# Patient Record
Sex: FEMALE | Race: WHITE | Employment: FULL TIME | URBAN - NONMETROPOLITAN AREA
[De-identification: names, ages, dates, MRNs, and addresses within clinical notes are randomized per-mention and may not be internally consistent; named-entity substitution may affect disease eponyms.]

---

## 2017-05-14 ENCOUNTER — HOSPITAL ENCOUNTER (EMERGENCY)
Age: 47
Discharge: HOME | End: 2017-05-14
Attending: EMERGENCY MEDICINE
Payer: COMMERCIAL

## 2017-05-14 VITALS
RESPIRATION RATE: 16 BRPM | TEMPERATURE: 98.8 F | BODY MASS INDEX: 29.88 KG/M2 | DIASTOLIC BLOOD PRESSURE: 83 MMHG | OXYGEN SATURATION: 100 % | SYSTOLIC BLOOD PRESSURE: 146 MMHG | HEIGHT: 64 IN | WEIGHT: 175 LBS | HEART RATE: 83 BPM

## 2017-05-14 DIAGNOSIS — L02.91 ABSCESS: Primary | ICD-10-CM

## 2017-05-14 PROCEDURE — 10060 I&D ABSCESS SIMPLE/SINGLE: CPT

## 2017-05-14 PROCEDURE — 2500000003 HC RX 250 WO HCPCS: Performed by: EMERGENCY MEDICINE

## 2017-05-14 PROCEDURE — 99282 EMERGENCY DEPT VISIT SF MDM: CPT

## 2017-05-14 RX ORDER — IBUPROFEN 800 MG/1
800 TABLET ORAL EVERY 8 HOURS PRN
Qty: 30 TABLET | Refills: 0 | Status: SHIPPED | OUTPATIENT
Start: 2017-05-14 | End: 2018-02-13

## 2017-05-14 RX ORDER — CEPHALEXIN 500 MG/1
500 CAPSULE ORAL 4 TIMES DAILY
Qty: 40 CAPSULE | Refills: 0 | Status: SHIPPED | OUTPATIENT
Start: 2017-05-14 | End: 2017-05-24

## 2017-05-14 RX ORDER — SULFAMETHOXAZOLE AND TRIMETHOPRIM 800; 160 MG/1; MG/1
1 TABLET ORAL 2 TIMES DAILY
Qty: 20 TABLET | Refills: 0 | Status: SHIPPED | OUTPATIENT
Start: 2017-05-14 | End: 2017-05-24

## 2017-05-14 RX ORDER — LIDOCAINE HYDROCHLORIDE 10 MG/ML
5 INJECTION, SOLUTION INFILTRATION; PERINEURAL ONCE
Status: COMPLETED | OUTPATIENT
Start: 2017-05-14 | End: 2017-05-14

## 2017-05-14 RX ADMIN — LIDOCAINE HYDROCHLORIDE 5 ML: 10 INJECTION, SOLUTION INFILTRATION; PERINEURAL at 13:01

## 2017-05-14 ASSESSMENT — PAIN SCALES - GENERAL
PAINLEVEL_OUTOF10: 8
PAINLEVEL_OUTOF10: 6

## 2017-05-14 ASSESSMENT — PAIN DESCRIPTION - DESCRIPTORS: DESCRIPTORS: THROBBING

## 2017-05-14 ASSESSMENT — PAIN DESCRIPTION - PAIN TYPE: TYPE: ACUTE PAIN

## 2017-05-14 ASSESSMENT — PAIN DESCRIPTION - ONSET: ONSET: GRADUAL

## 2017-05-14 ASSESSMENT — PAIN DESCRIPTION - LOCATION: LOCATION: GROIN

## 2017-05-14 ASSESSMENT — PAIN DESCRIPTION - ORIENTATION: ORIENTATION: LEFT

## 2017-05-14 ASSESSMENT — PAIN DESCRIPTION - FREQUENCY: FREQUENCY: INTERMITTENT

## 2017-06-27 ENCOUNTER — OFFICE VISIT (OUTPATIENT)
Dept: PODIATRY | Age: 47
End: 2017-06-27
Payer: COMMERCIAL

## 2017-06-27 VITALS — HEIGHT: 65 IN | SYSTOLIC BLOOD PRESSURE: 118 MMHG | DIASTOLIC BLOOD PRESSURE: 70 MMHG | HEART RATE: 70 BPM

## 2017-06-27 DIAGNOSIS — M72.2 PLANTAR FASCIITIS OF RIGHT FOOT: Primary | ICD-10-CM

## 2017-06-27 PROCEDURE — 20550 NJX 1 TENDON SHEATH/LIGAMENT: CPT | Performed by: PODIATRIST

## 2017-06-27 PROCEDURE — L3040 FT ARCH SUPRT PREMOLD LONGIT: HCPCS | Performed by: PODIATRIST

## 2017-06-27 RX ORDER — BETAMETHASONE SODIUM PHOSPHATE AND BETAMETHASONE ACETATE 3; 3 MG/ML; MG/ML
6 INJECTION, SUSPENSION INTRA-ARTICULAR; INTRALESIONAL; INTRAMUSCULAR; SOFT TISSUE ONCE
Status: COMPLETED | OUTPATIENT
Start: 2017-06-27 | End: 2017-06-27

## 2017-06-27 RX ADMIN — BETAMETHASONE SODIUM PHOSPHATE AND BETAMETHASONE ACETATE 6 MG: 3; 3 INJECTION, SUSPENSION INTRA-ARTICULAR; INTRALESIONAL; INTRAMUSCULAR; SOFT TISSUE at 14:43

## 2017-07-14 ENCOUNTER — TELEPHONE (OUTPATIENT)
Dept: PODIATRY | Age: 47
End: 2017-07-14

## 2018-02-13 ENCOUNTER — HOSPITAL ENCOUNTER (OUTPATIENT)
Age: 48
Setting detail: SPECIMEN
Discharge: HOME | End: 2018-02-13
Payer: COMMERCIAL

## 2018-02-13 ENCOUNTER — OFFICE VISIT (OUTPATIENT)
Dept: OBGYN | Age: 48
End: 2018-02-13
Payer: COMMERCIAL

## 2018-02-13 VITALS
DIASTOLIC BLOOD PRESSURE: 70 MMHG | SYSTOLIC BLOOD PRESSURE: 118 MMHG | HEART RATE: 68 BPM | HEIGHT: 64 IN | BODY MASS INDEX: 30.05 KG/M2 | WEIGHT: 176 LBS

## 2018-02-13 DIAGNOSIS — Z01.419 WOMEN'S ANNUAL ROUTINE GYNECOLOGICAL EXAMINATION: ICD-10-CM

## 2018-02-13 DIAGNOSIS — Z00.00 ANNUAL PHYSICAL EXAM: Primary | ICD-10-CM

## 2018-02-13 DIAGNOSIS — N95.1 PERIMENOPAUSAL: ICD-10-CM

## 2018-02-13 DIAGNOSIS — Z00.00 ANNUAL PHYSICAL EXAM: ICD-10-CM

## 2018-02-13 PROCEDURE — 99396 PREV VISIT EST AGE 40-64: CPT | Performed by: ADVANCED PRACTICE MIDWIFE

## 2018-02-13 PROCEDURE — G0145 SCR C/V CYTO,THINLAYER,RESCR: HCPCS

## 2018-02-13 ASSESSMENT — ENCOUNTER SYMPTOMS
EYES NEGATIVE: 1
ALLERGIC/IMMUNOLOGIC NEGATIVE: 1
GASTROINTESTINAL NEGATIVE: 1
RESPIRATORY NEGATIVE: 1

## 2018-02-13 NOTE — LETTER
[facility]  [address] [place] [address]          February 22, 2018    [address] [place] [address]      Dear Roz Geronimo: The results of your most recent Pap smear are normal. This means that no cancerous or precancerous cells were seen. We recommend that you come back in 1 year for your next routine Pap smear. If you have any questions or concerns, please don't hesitate to call.     Sincerely,        Anil Grant CNM

## 2018-02-20 ENCOUNTER — HOSPITAL ENCOUNTER (OUTPATIENT)
Dept: MAMMOGRAPHY | Age: 48
Discharge: HOME | End: 2018-02-22
Payer: COMMERCIAL

## 2018-02-20 DIAGNOSIS — N95.1 PERIMENOPAUSAL: ICD-10-CM

## 2018-02-20 DIAGNOSIS — Z01.419 WOMEN'S ANNUAL ROUTINE GYNECOLOGICAL EXAMINATION: ICD-10-CM

## 2018-02-20 DIAGNOSIS — Z00.00 ANNUAL PHYSICAL EXAM: ICD-10-CM

## 2018-02-20 PROCEDURE — 77063 BREAST TOMOSYNTHESIS BI: CPT

## 2018-02-22 LAB — CYTOLOGY REPORT: NORMAL

## 2018-02-28 ENCOUNTER — HOSPITAL ENCOUNTER (OUTPATIENT)
Dept: LAB | Age: 48
Setting detail: SPECIMEN
Discharge: HOME | End: 2018-02-28
Payer: COMMERCIAL

## 2018-02-28 DIAGNOSIS — N95.1 PERIMENOPAUSAL: ICD-10-CM

## 2018-02-28 DIAGNOSIS — Z01.419 WOMEN'S ANNUAL ROUTINE GYNECOLOGICAL EXAMINATION: ICD-10-CM

## 2018-02-28 LAB
ABSOLUTE EOS #: 0.1 K/UL (ref 0–0.4)
ABSOLUTE IMMATURE GRANULOCYTE: NORMAL K/UL (ref 0–0.3)
ABSOLUTE LYMPH #: 1.6 K/UL (ref 1–4.8)
ABSOLUTE MONO #: 0.4 K/UL (ref 0.1–1.2)
ALBUMIN SERPL-MCNC: 4.2 G/DL (ref 3.5–5.2)
ALBUMIN/GLOBULIN RATIO: 1.4 (ref 1–2.5)
ALP BLD-CCNC: 77 U/L (ref 35–104)
ALT SERPL-CCNC: 9 U/L (ref 5–33)
ANION GAP SERPL CALCULATED.3IONS-SCNC: 12 MMOL/L (ref 9–17)
AST SERPL-CCNC: 14 U/L
BASOPHILS # BLD: 1 % (ref 0–1)
BASOPHILS ABSOLUTE: 0 K/UL (ref 0–0.2)
BILIRUB SERPL-MCNC: 0.8 MG/DL (ref 0.3–1.2)
BUN BLDV-MCNC: 10 MG/DL (ref 6–20)
BUN/CREAT BLD: 22 (ref 9–20)
CALCIUM SERPL-MCNC: 9.3 MG/DL (ref 8.6–10.4)
CHLORIDE BLD-SCNC: 101 MMOL/L (ref 98–107)
CHOLESTEROL, FASTING: 198 MG/DL
CHOLESTEROL/HDL RATIO: 2.8
CO2: 27 MMOL/L (ref 20–31)
CREAT SERPL-MCNC: 0.45 MG/DL (ref 0.5–0.9)
DIFFERENTIAL TYPE: NORMAL
EOSINOPHILS RELATIVE PERCENT: 1 % (ref 1–7)
GFR AFRICAN AMERICAN: >60 ML/MIN
GFR NON-AFRICAN AMERICAN: >60 ML/MIN
GFR SERPL CREATININE-BSD FRML MDRD: ABNORMAL ML/MIN/{1.73_M2}
GFR SERPL CREATININE-BSD FRML MDRD: ABNORMAL ML/MIN/{1.73_M2}
GLUCOSE BLD-MCNC: 98 MG/DL (ref 70–99)
HCT VFR BLD CALC: 37.6 % (ref 36–46)
HDLC SERPL-MCNC: 72 MG/DL
HEMOGLOBIN: 12.6 G/DL (ref 12–16)
IMMATURE GRANULOCYTES: NORMAL %
LDL CHOLESTEROL: 108 MG/DL (ref 0–130)
LYMPHOCYTES # BLD: 33 % (ref 16–46)
MCH RBC QN AUTO: 26.9 PG (ref 26–34)
MCHC RBC AUTO-ENTMCNC: 33.4 G/DL (ref 31–37)
MCV RBC AUTO: 80.3 FL (ref 80–100)
MONOCYTES # BLD: 8 % (ref 4–11)
NRBC AUTOMATED: NORMAL PER 100 WBC
PDW BLD-RTO: 14.2 % (ref 11–14.5)
PLATELET # BLD: 251 K/UL (ref 140–450)
PLATELET ESTIMATE: NORMAL
PMV BLD AUTO: 8.8 FL (ref 6–12)
POTASSIUM SERPL-SCNC: 4.4 MMOL/L (ref 3.7–5.3)
RBC # BLD: 4.68 M/UL (ref 4–5.2)
RBC # BLD: NORMAL 10*6/UL
SEG NEUTROPHILS: 57 % (ref 43–77)
SEGMENTED NEUTROPHILS ABSOLUTE COUNT: 2.7 K/UL (ref 1.8–7.7)
SODIUM BLD-SCNC: 140 MMOL/L (ref 135–144)
THYROXINE, FREE: 1.12 NG/DL (ref 0.93–1.7)
TOTAL PROTEIN: 7.3 G/DL (ref 6.4–8.3)
TRIGLYCERIDE, FASTING: 91 MG/DL
TSH SERPL DL<=0.05 MIU/L-ACNC: 2.05 MIU/L (ref 0.3–5)
VLDLC SERPL CALC-MCNC: NORMAL MG/DL (ref 1–30)
WBC # BLD: 4.8 K/UL (ref 3.5–11)
WBC # BLD: NORMAL 10*3/UL

## 2018-02-28 PROCEDURE — 84439 ASSAY OF FREE THYROXINE: CPT

## 2018-02-28 PROCEDURE — 36415 COLL VENOUS BLD VENIPUNCTURE: CPT

## 2018-02-28 PROCEDURE — 84443 ASSAY THYROID STIM HORMONE: CPT

## 2018-02-28 PROCEDURE — 80061 LIPID PANEL: CPT

## 2018-02-28 PROCEDURE — 85025 COMPLETE CBC W/AUTO DIFF WBC: CPT

## 2018-02-28 PROCEDURE — 80053 COMPREHEN METABOLIC PANEL: CPT

## 2019-01-31 ENCOUNTER — TELEPHONE (OUTPATIENT)
Dept: OBGYN | Age: 49
End: 2019-01-31

## 2019-01-31 DIAGNOSIS — F50.89 PICA: ICD-10-CM

## 2019-01-31 DIAGNOSIS — R53.83 OTHER FATIGUE: Primary | ICD-10-CM

## 2019-02-06 ENCOUNTER — HOSPITAL ENCOUNTER (OUTPATIENT)
Dept: LAB | Age: 49
Discharge: HOME | End: 2019-02-06
Payer: COMMERCIAL

## 2019-02-06 DIAGNOSIS — R53.83 OTHER FATIGUE: ICD-10-CM

## 2019-02-06 DIAGNOSIS — F50.89 PICA: ICD-10-CM

## 2019-02-06 LAB
ABSOLUTE EOS #: 0.1 K/UL (ref 0–0.4)
ABSOLUTE IMMATURE GRANULOCYTE: ABNORMAL K/UL (ref 0–0.3)
ABSOLUTE LYMPH #: 1.4 K/UL (ref 1–4.8)
ABSOLUTE MONO #: 0.4 K/UL (ref 0.1–1.2)
BASOPHILS # BLD: 1 % (ref 0–1)
BASOPHILS ABSOLUTE: 0 K/UL (ref 0–0.2)
DIFFERENTIAL TYPE: ABNORMAL
EOSINOPHILS RELATIVE PERCENT: 2 % (ref 1–7)
HCT VFR BLD CALC: 37 % (ref 36–46)
HEMOGLOBIN: 11.9 G/DL (ref 12–16)
IMMATURE GRANULOCYTES: ABNORMAL %
IRON SATURATION: 8 % (ref 20–55)
IRON: 34 UG/DL (ref 37–145)
LYMPHOCYTES # BLD: 30 % (ref 16–46)
MCH RBC QN AUTO: 25.5 PG (ref 26–34)
MCHC RBC AUTO-ENTMCNC: 32.1 G/DL (ref 31–37)
MCV RBC AUTO: 79.5 FL (ref 80–100)
MONOCYTES # BLD: 9 % (ref 4–11)
NRBC AUTOMATED: ABNORMAL PER 100 WBC
PDW BLD-RTO: 14.7 % (ref 11–14.5)
PLATELET # BLD: 276 K/UL (ref 140–450)
PLATELET ESTIMATE: ABNORMAL
PMV BLD AUTO: 8.6 FL (ref 6–12)
RBC # BLD: 4.65 M/UL (ref 4–5.2)
RBC # BLD: ABNORMAL 10*6/UL
SEG NEUTROPHILS: 58 % (ref 43–77)
SEGMENTED NEUTROPHILS ABSOLUTE COUNT: 2.7 K/UL (ref 1.8–7.7)
TOTAL IRON BINDING CAPACITY: 414 UG/DL (ref 250–450)
UNSATURATED IRON BINDING CAPACITY: 380 UG/DL (ref 112–347)
WBC # BLD: 4.7 K/UL (ref 3.5–11)
WBC # BLD: ABNORMAL 10*3/UL

## 2019-02-06 PROCEDURE — 36415 COLL VENOUS BLD VENIPUNCTURE: CPT

## 2019-02-06 PROCEDURE — 83540 ASSAY OF IRON: CPT

## 2019-02-06 PROCEDURE — 83550 IRON BINDING TEST: CPT

## 2019-02-06 PROCEDURE — 85025 COMPLETE CBC W/AUTO DIFF WBC: CPT

## 2019-03-08 ENCOUNTER — TELEPHONE (OUTPATIENT)
Dept: MAMMOGRAPHY | Age: 49
End: 2019-03-08

## 2019-03-08 DIAGNOSIS — Z12.39 SCREENING FOR BREAST CANCER: Primary | ICD-10-CM

## 2019-03-12 ENCOUNTER — HOSPITAL ENCOUNTER (OUTPATIENT)
Dept: MAMMOGRAPHY | Age: 49
Discharge: HOME | End: 2019-03-14
Payer: COMMERCIAL

## 2019-03-12 ENCOUNTER — OFFICE VISIT (OUTPATIENT)
Dept: OBGYN | Age: 49
End: 2019-03-12
Payer: COMMERCIAL

## 2019-03-12 ENCOUNTER — HOSPITAL ENCOUNTER (OUTPATIENT)
Age: 49
Setting detail: SPECIMEN
Discharge: HOME | End: 2019-03-12
Payer: COMMERCIAL

## 2019-03-12 VITALS
WEIGHT: 185 LBS | HEIGHT: 64 IN | SYSTOLIC BLOOD PRESSURE: 110 MMHG | BODY MASS INDEX: 31.58 KG/M2 | DIASTOLIC BLOOD PRESSURE: 64 MMHG | HEART RATE: 78 BPM

## 2019-03-12 DIAGNOSIS — N85.2 BULKY OR ENLARGED UTERUS: ICD-10-CM

## 2019-03-12 DIAGNOSIS — L29.2 ITCHING OF VULVA: ICD-10-CM

## 2019-03-12 DIAGNOSIS — Z01.419 WOMEN'S ANNUAL ROUTINE GYNECOLOGICAL EXAMINATION: Primary | ICD-10-CM

## 2019-03-12 DIAGNOSIS — Z01.419 WOMEN'S ANNUAL ROUTINE GYNECOLOGICAL EXAMINATION: ICD-10-CM

## 2019-03-12 DIAGNOSIS — Z12.39 SCREENING FOR BREAST CANCER: ICD-10-CM

## 2019-03-12 PROCEDURE — 99396 PREV VISIT EST AGE 40-64: CPT | Performed by: ADVANCED PRACTICE MIDWIFE

## 2019-03-12 PROCEDURE — G0145 SCR C/V CYTO,THINLAYER,RESCR: HCPCS

## 2019-03-12 PROCEDURE — 77063 BREAST TOMOSYNTHESIS BI: CPT

## 2019-03-12 ASSESSMENT — ENCOUNTER SYMPTOMS
GASTROINTESTINAL NEGATIVE: 1
RESPIRATORY NEGATIVE: 1
EYES NEGATIVE: 1

## 2019-03-12 ASSESSMENT — PATIENT HEALTH QUESTIONNAIRE - PHQ9
2. FEELING DOWN, DEPRESSED OR HOPELESS: 1
SUM OF ALL RESPONSES TO PHQ9 QUESTIONS 1 & 2: 2
SUM OF ALL RESPONSES TO PHQ QUESTIONS 1-9: 2
SUM OF ALL RESPONSES TO PHQ QUESTIONS 1-9: 2
1. LITTLE INTEREST OR PLEASURE IN DOING THINGS: 1

## 2019-03-13 LAB — COMMENT: NORMAL

## 2019-03-19 ENCOUNTER — HOSPITAL ENCOUNTER (OUTPATIENT)
Dept: ULTRASOUND IMAGING | Age: 49
Discharge: HOME | End: 2019-03-21
Payer: COMMERCIAL

## 2019-03-19 DIAGNOSIS — N85.2 BULKY OR ENLARGED UTERUS: ICD-10-CM

## 2019-03-19 PROCEDURE — 76830 TRANSVAGINAL US NON-OB: CPT

## 2019-03-25 LAB — CYTOLOGY REPORT: NORMAL

## 2019-03-26 DIAGNOSIS — N83.202 CYST OF LEFT OVARY: Primary | ICD-10-CM

## 2019-04-01 ENCOUNTER — HOSPITAL ENCOUNTER (OUTPATIENT)
Dept: LAB | Age: 49
Discharge: HOME | End: 2019-04-01
Payer: COMMERCIAL

## 2019-04-01 DIAGNOSIS — N83.202 CYST OF LEFT OVARY: ICD-10-CM

## 2019-04-01 LAB
ABSOLUTE EOS #: 0.1 K/UL (ref 0–0.4)
ABSOLUTE IMMATURE GRANULOCYTE: ABNORMAL K/UL (ref 0–0.3)
ABSOLUTE LYMPH #: 1.5 K/UL (ref 1–4.8)
ABSOLUTE MONO #: 0.4 K/UL (ref 0.1–1.2)
AFP: 3.6 UG/L
BASOPHILS # BLD: 0 % (ref 0–1)
BASOPHILS ABSOLUTE: 0 K/UL (ref 0–0.2)
CA 125: 12 U/ML
CARCINOEMBRYONIC ANTIGEN: 2.3 NG/ML
DIFFERENTIAL TYPE: ABNORMAL
EOSINOPHILS RELATIVE PERCENT: 2 % (ref 1–7)
HCT VFR BLD CALC: 39.9 % (ref 36–46)
HEMOGLOBIN: 12.9 G/DL (ref 12–16)
IMMATURE GRANULOCYTES: ABNORMAL %
LYMPHOCYTES # BLD: 30 % (ref 16–46)
MCH RBC QN AUTO: 26.5 PG (ref 26–34)
MCHC RBC AUTO-ENTMCNC: 32.4 G/DL (ref 31–37)
MCV RBC AUTO: 81.6 FL (ref 80–100)
MONOCYTES # BLD: 8 % (ref 4–11)
NRBC AUTOMATED: ABNORMAL PER 100 WBC
PDW BLD-RTO: 16.8 % (ref 11–14.5)
PLATELET # BLD: 256 K/UL (ref 140–450)
PLATELET ESTIMATE: ABNORMAL
PMV BLD AUTO: 8.7 FL (ref 6–12)
RBC # BLD: 4.89 M/UL (ref 4–5.2)
RBC # BLD: ABNORMAL 10*6/UL
SEG NEUTROPHILS: 60 % (ref 43–77)
SEGMENTED NEUTROPHILS ABSOLUTE COUNT: 3 K/UL (ref 1.8–7.7)
WBC # BLD: 4.9 K/UL (ref 3.5–11)
WBC # BLD: ABNORMAL 10*3/UL

## 2019-04-01 PROCEDURE — 85025 COMPLETE CBC W/AUTO DIFF WBC: CPT

## 2019-04-01 PROCEDURE — 36415 COLL VENOUS BLD VENIPUNCTURE: CPT

## 2019-04-01 PROCEDURE — 82105 ALPHA-FETOPROTEIN SERUM: CPT

## 2019-04-01 PROCEDURE — 86304 IMMUNOASSAY TUMOR CA 125: CPT

## 2019-04-01 PROCEDURE — 82378 CARCINOEMBRYONIC ANTIGEN: CPT

## 2019-04-09 ENCOUNTER — OFFICE VISIT (OUTPATIENT)
Dept: OBGYN | Age: 49
End: 2019-04-09
Payer: COMMERCIAL

## 2019-04-09 VITALS
DIASTOLIC BLOOD PRESSURE: 78 MMHG | SYSTOLIC BLOOD PRESSURE: 124 MMHG | WEIGHT: 185 LBS | HEIGHT: 64 IN | RESPIRATION RATE: 20 BRPM | HEART RATE: 72 BPM | BODY MASS INDEX: 31.58 KG/M2

## 2019-04-09 DIAGNOSIS — N83.202 CYST OF LEFT OVARY: Primary | ICD-10-CM

## 2019-04-09 DIAGNOSIS — D25.1 INTRAMURAL LEIOMYOMA OF UTERUS: ICD-10-CM

## 2019-04-09 PROCEDURE — 99202 OFFICE O/P NEW SF 15 MIN: CPT | Performed by: OBSTETRICS & GYNECOLOGY

## 2019-04-09 RX ORDER — PHENOL 1.4 %
1 AEROSOL, SPRAY (ML) MUCOUS MEMBRANE DAILY
COMMUNITY
End: 2021-11-17

## 2019-04-09 RX ORDER — MEDROXYPROGESTERONE ACETATE 10 MG/1
10 TABLET ORAL 2 TIMES DAILY
Qty: 90 TABLET | Refills: 3 | Status: SHIPPED | OUTPATIENT
Start: 2019-04-09 | End: 2019-09-17

## 2019-04-09 RX ORDER — MAGNESIUM OXIDE 400 MG/1
400 TABLET ORAL DAILY
COMMUNITY
End: 2021-11-17

## 2019-04-09 NOTE — PROGRESS NOTES
Subjective:      Patient ID: Martin Dobbins  is a 50 y.o. y.o. Female. P2    Ultrasound : 7.7 cm left ovarian cyst  C/o occ dull achy pains,     Gynecologic Exam   This is a chronic problem. The current episode started more than 1 month ago. The problem occurs intermittently. The problem has been waxing and waning. Nothing aggravates the symptoms. She has tried nothing for the symptoms. The treatment provided no relief. No chief complaint on file. Family History   Problem Relation Age of Onset    Cancer Maternal Uncle         Hodgkin's disease    Cancer Paternal Aunt         Met Breast cancer    Other Mother         fluid around heart      Past Medical History:   Diagnosis Date    Anemia     Anemia     Headache     Hyperlipidemia     Obesity     Had Gastric Bypass 2003     Past Surgical History:   Procedure Laterality Date    DILATION AND CURETTAGE  12/2010    Endometrial Ablation    ENDOMETRIAL ABLATION  12/2010    GASTRIC BYPASS SURGERY  2003   [place] HERNIA REPAIR  2003    HYSTEROSCOPY  12/2010    with Endometrial Ablation     LITHOTRIPSY Bilateral     WISDOM TOOTH EXTRACTION        reports that she has never smoked. She has never used smokeless tobacco. She reports that she does not drink alcohol or use drugs. No Known Allergies    Current Outpatient Medications:     Multiple Vitamins-Minerals (MULTIVITAMIN GUMMIES ADULT PO), Take by mouth, Disp: , Rfl:     Multiple Vitamins-Minerals (MULTIVITAMIN ADULT PO), Take 1 capsule by mouth, Disp: , Rfl:       Review of Systems   Genitourinary: Positive for pelvic pain. All other systems reviewed and are negative. Breast ROS: negative    Objective:   LMP 02/28/2019 (Approximate)     Physical Exam   Constitutional: She is oriented to person, place, and time. She appears well-developed and well-nourished. Eyes: Pupils are equal, round, and reactive to light. Neck: Normal range of motion. Neck supple.    Pulmonary/Chest: Effort normal. No respiratory distress. Musculoskeletal: Normal range of motion. She exhibits no edema or deformity. Neurological: She is oriented to person, place, and time. Skin: Skin is warm. Psychiatric: She has a normal mood and affect. Her behavior is normal.   Nursing note and vitals reviewed. Breast exam: WNL, No skin retraction, dimpling or skin discoloration. No nippledischarge. Any bleeding or pain withintercourse: No    Last Yearly Pap Smear:  normal    Last Mammogram: negative    Do you do self breast exams: Yes    Assessment:      Diagnosis Orders   1. Cyst of left ovary     2. Intramural leiomyoma of uterus             Plan:   No orders of the defined types were placed in this encounter. A full discussion with the patient and her  about the ovarian cyst risk and benefits of medical treatment versus surgical.  Avoiding exercises if we give her medical treatment until she feels better.   Will give her Provera 10 mg twice a day for the next 4-6 weeks and repeat pelvic ultrasound          Jensen Martins MD

## 2019-05-21 ENCOUNTER — HOSPITAL ENCOUNTER (OUTPATIENT)
Dept: ULTRASOUND IMAGING | Age: 49
Discharge: HOME | End: 2019-05-23
Payer: COMMERCIAL

## 2019-05-21 DIAGNOSIS — N83.202 CYST OF LEFT OVARY: ICD-10-CM

## 2019-05-21 DIAGNOSIS — D25.1 INTRAMURAL LEIOMYOMA OF UTERUS: ICD-10-CM

## 2019-05-21 PROCEDURE — 76856 US EXAM PELVIC COMPLETE: CPT

## 2019-05-21 PROCEDURE — 76830 TRANSVAGINAL US NON-OB: CPT

## 2019-05-23 ENCOUNTER — PATIENT MESSAGE (OUTPATIENT)
Dept: OBGYN | Age: 49
End: 2019-05-23

## 2019-05-23 DIAGNOSIS — N83.202 CYST OF LEFT OVARY: Primary | ICD-10-CM

## 2019-05-23 RX ORDER — MEDROXYPROGESTERONE ACETATE 5 MG/1
10 TABLET ORAL 2 TIMES DAILY
Qty: 120 TABLET | Refills: 1 | Status: SHIPPED | OUTPATIENT
Start: 2019-05-23 | End: 2019-09-17

## 2019-05-23 NOTE — TELEPHONE ENCOUNTER
Patient needs a refill on Provera    I pended the medication for you. Just double check the directions are correct.     Thanks

## 2019-06-19 ENCOUNTER — PATIENT MESSAGE (OUTPATIENT)
Dept: OBGYN | Age: 49
End: 2019-06-19

## 2019-06-19 DIAGNOSIS — D25.1 INTRAMURAL LEIOMYOMA OF UTERUS: Primary | ICD-10-CM

## 2019-06-19 NOTE — TELEPHONE ENCOUNTER
From: J Carlos Castro  To: Marin Boudreaux MD  Sent: 6/19/2019 11:46 AM EDT  Subject: Non-Urgent Medical Question    I am sorry to bother you but unless I am missing it or there is some reason the doctor does not want the ultrasound, I can not see that it is scheduled. The 25th is next week and I assume the time that I would have liked (1:30-2:00) is now not available, although I don't know that for sure. Thank you.

## 2019-06-25 ENCOUNTER — HOSPITAL ENCOUNTER (OUTPATIENT)
Dept: ULTRASOUND IMAGING | Age: 49
Discharge: HOME | End: 2019-06-27
Payer: COMMERCIAL

## 2019-06-25 DIAGNOSIS — D25.1 INTRAMURAL LEIOMYOMA OF UTERUS: ICD-10-CM

## 2019-06-25 PROCEDURE — 76856 US EXAM PELVIC COMPLETE: CPT

## 2019-06-25 PROCEDURE — 76830 TRANSVAGINAL US NON-OB: CPT

## 2019-06-26 ENCOUNTER — PATIENT MESSAGE (OUTPATIENT)
Dept: OBGYN | Age: 49
End: 2019-06-26

## 2019-09-17 ENCOUNTER — HOSPITAL ENCOUNTER (OUTPATIENT)
Age: 49
Discharge: HOME | End: 2019-09-19
Payer: COMMERCIAL

## 2019-09-17 ENCOUNTER — OFFICE VISIT (OUTPATIENT)
Dept: PRIMARY CARE CLINIC | Age: 49
End: 2019-09-17
Payer: COMMERCIAL

## 2019-09-17 ENCOUNTER — HOSPITAL ENCOUNTER (OUTPATIENT)
Dept: GENERAL RADIOLOGY | Age: 49
Discharge: HOME | End: 2019-09-19
Payer: COMMERCIAL

## 2019-09-17 VITALS
BODY MASS INDEX: 33.63 KG/M2 | RESPIRATION RATE: 16 BRPM | DIASTOLIC BLOOD PRESSURE: 70 MMHG | HEART RATE: 76 BPM | SYSTOLIC BLOOD PRESSURE: 120 MMHG | TEMPERATURE: 99.1 F | WEIGHT: 197 LBS | HEIGHT: 64 IN | OXYGEN SATURATION: 99 %

## 2019-09-17 DIAGNOSIS — M79.644 FINGER PAIN, RIGHT: ICD-10-CM

## 2019-09-17 DIAGNOSIS — M25.532 LEFT WRIST PAIN: ICD-10-CM

## 2019-09-17 DIAGNOSIS — M25.532 LEFT WRIST PAIN: Primary | ICD-10-CM

## 2019-09-17 PROCEDURE — 73110 X-RAY EXAM OF WRIST: CPT

## 2019-09-17 PROCEDURE — 73140 X-RAY EXAM OF FINGER(S): CPT

## 2019-09-17 PROCEDURE — 99213 OFFICE O/P EST LOW 20 MIN: CPT | Performed by: PHYSICIAN ASSISTANT

## 2019-09-17 RX ORDER — IBUPROFEN 800 MG/1
800 TABLET ORAL
COMMUNITY
Start: 2017-05-14 | End: 2021-11-17

## 2019-09-17 ASSESSMENT — ENCOUNTER SYMPTOMS: BOWEL INCONTINENCE: 0

## 2019-09-18 ASSESSMENT — ENCOUNTER SYMPTOMS: RESPIRATORY NEGATIVE: 1

## 2019-09-18 NOTE — PROGRESS NOTES
Subjective:      Patient ID: Andre Melgar is a 52 y.o. female. Fall   The accident occurred more than 1 week ago. The fall occurred while walking. She landed on concrete. The point of impact was the left knee, right knee, left wrist and right wrist. The pain is present in the left wrist. The symptoms are aggravated by use of injured limb. Pertinent negatives include no bowel incontinence or loss of consciousness. She has tried ice for the symptoms. Review of Systems   Constitutional: Negative. HENT: Negative. Respiratory: Negative. Cardiovascular: Negative. Gastrointestinal: Negative for bowel incontinence. Neurological: Negative for loss of consciousness. Objective:   Physical Exam   Constitutional: She is oriented to person, place, and time. She appears well-developed. HENT:   Head: Normocephalic. Right Ear: External ear normal.   Left Ear: External ear normal.   Eyes: Pupils are equal, round, and reactive to light. Cardiovascular: Normal rate and regular rhythm. Pulmonary/Chest: Effort normal.   Musculoskeletal:        Left wrist: She exhibits tenderness. She exhibits normal range of motion. Right hand: She exhibits tenderness and deformity (arthritic changes noted). She exhibits normal range of motion. Normal strength noted. Hands:  Neurological: She is alert and oriented to person, place, and time. Skin: Skin is warm and dry. Psychiatric: She has a normal mood and affect. Xr Wrist Left (min 3 Views)    Result Date: 9/17/2019  EXAMINATION: 3 XRAY VIEWS OF THE LEFT WRIST 9/17/2019 8:16 pm COMPARISON: None. HISTORY: ORDERING SYSTEM PROVIDED HISTORY: Left wrist pain TECHNOLOGIST PROVIDED HISTORY: fell 1 week ago. pain to left wrist and right 5th finger Reason for Exam: left wrist pain; fell 1 week ago FINDINGS: There is no evidence of acute fracture. There is normal alignment. No acute joint abnormality. No focal osseous lesion.  No focal soft

## 2019-10-03 ENCOUNTER — APPOINTMENT (OUTPATIENT)
Dept: ULTRASOUND IMAGING | Age: 49
End: 2019-10-03
Payer: COMMERCIAL

## 2019-10-03 ENCOUNTER — HOSPITAL ENCOUNTER (EMERGENCY)
Age: 49
Discharge: HOME | End: 2019-10-03
Attending: EMERGENCY MEDICINE
Payer: COMMERCIAL

## 2019-10-03 ENCOUNTER — APPOINTMENT (OUTPATIENT)
Dept: CT IMAGING | Age: 49
End: 2019-10-03
Payer: COMMERCIAL

## 2019-10-03 VITALS
DIASTOLIC BLOOD PRESSURE: 61 MMHG | OXYGEN SATURATION: 98 % | WEIGHT: 200 LBS | BODY MASS INDEX: 34.33 KG/M2 | HEART RATE: 73 BPM | SYSTOLIC BLOOD PRESSURE: 118 MMHG | TEMPERATURE: 98.1 F | RESPIRATION RATE: 12 BRPM

## 2019-10-03 DIAGNOSIS — N83.202 HEMORRHAGIC CYST OF LEFT OVARY: Primary | ICD-10-CM

## 2019-10-03 DIAGNOSIS — R10.2 PELVIC PAIN: Primary | ICD-10-CM

## 2019-10-03 DIAGNOSIS — R10.2 FEMALE PELVIC PAIN: ICD-10-CM

## 2019-10-03 LAB
-: ABNORMAL
ABSOLUTE EOS #: 0 K/UL (ref 0–0.4)
ABSOLUTE IMMATURE GRANULOCYTE: NORMAL K/UL (ref 0–0.3)
ABSOLUTE LYMPH #: 1.4 K/UL (ref 1–4.8)
ABSOLUTE MONO #: 0.6 K/UL (ref 0.1–1.2)
ALBUMIN SERPL-MCNC: 4.6 G/DL (ref 3.5–5.2)
ALBUMIN/GLOBULIN RATIO: 1.5 (ref 1–2.5)
ALP BLD-CCNC: 83 U/L (ref 35–104)
ALT SERPL-CCNC: 16 U/L (ref 5–33)
AMORPHOUS: ABNORMAL
ANION GAP SERPL CALCULATED.3IONS-SCNC: 13 MMOL/L (ref 9–17)
AST SERPL-CCNC: 21 U/L
BACTERIA: ABNORMAL
BASOPHILS # BLD: 0 % (ref 0–1)
BASOPHILS ABSOLUTE: 0 K/UL (ref 0–0.2)
BILIRUB SERPL-MCNC: 0.88 MG/DL (ref 0.3–1.2)
BILIRUBIN URINE: NEGATIVE
BUN BLDV-MCNC: 11 MG/DL (ref 6–20)
BUN/CREAT BLD: 26 (ref 9–20)
CALCIUM SERPL-MCNC: 9.7 MG/DL (ref 8.6–10.4)
CASTS UA: ABNORMAL /LPF (ref 0–2)
CHLORIDE BLD-SCNC: 99 MMOL/L (ref 98–107)
CO2: 24 MMOL/L (ref 20–31)
COLOR: ABNORMAL
COMMENT UA: ABNORMAL
CREAT SERPL-MCNC: 0.43 MG/DL (ref 0.5–0.9)
CRYSTALS, UA: ABNORMAL /HPF
DIFFERENTIAL TYPE: NORMAL
EOSINOPHILS RELATIVE PERCENT: 1 % (ref 1–7)
EPITHELIAL CELLS UA: ABNORMAL /HPF (ref 0–5)
GFR AFRICAN AMERICAN: >60 ML/MIN
GFR NON-AFRICAN AMERICAN: >60 ML/MIN
GFR SERPL CREATININE-BSD FRML MDRD: ABNORMAL ML/MIN/{1.73_M2}
GFR SERPL CREATININE-BSD FRML MDRD: ABNORMAL ML/MIN/{1.73_M2}
GLUCOSE BLD-MCNC: 106 MG/DL (ref 70–99)
GLUCOSE URINE: NEGATIVE
HCT VFR BLD CALC: 37.1 % (ref 36–46)
HEMOGLOBIN: 12.6 G/DL (ref 12–16)
IMMATURE GRANULOCYTES: NORMAL %
KETONES, URINE: NEGATIVE
LEUKOCYTE ESTERASE, URINE: ABNORMAL
LYMPHOCYTES # BLD: 18 % (ref 16–46)
MCH RBC QN AUTO: 29.2 PG (ref 26–34)
MCHC RBC AUTO-ENTMCNC: 33.9 G/DL (ref 31–37)
MCV RBC AUTO: 86.1 FL (ref 80–100)
MONOCYTES # BLD: 7 % (ref 4–11)
MUCUS: ABNORMAL
NITRITE, URINE: NEGATIVE
NRBC AUTOMATED: NORMAL PER 100 WBC
OTHER OBSERVATIONS UA: ABNORMAL
PDW BLD-RTO: 13.3 % (ref 11–14.5)
PH UA: 6 (ref 5–6)
PLATELET # BLD: 309 K/UL (ref 140–450)
PLATELET ESTIMATE: NORMAL
PMV BLD AUTO: 8.8 FL (ref 6–12)
POTASSIUM SERPL-SCNC: 4.1 MMOL/L (ref 3.7–5.3)
PROTEIN UA: NEGATIVE
RBC # BLD: 4.31 M/UL (ref 4–5.2)
RBC # BLD: NORMAL 10*6/UL
RBC UA: ABNORMAL /HPF (ref 0–4)
RENAL EPITHELIAL, UA: ABNORMAL /HPF
SEG NEUTROPHILS: 74 % (ref 43–77)
SEGMENTED NEUTROPHILS ABSOLUTE COUNT: 5.9 K/UL (ref 1.8–7.7)
SODIUM BLD-SCNC: 136 MMOL/L (ref 135–144)
SPECIFIC GRAVITY UA: 1.02 (ref 1.01–1.02)
TOTAL PROTEIN: 7.7 G/DL (ref 6.4–8.3)
TRICHOMONAS: ABNORMAL
TURBIDITY: ABNORMAL
URINE HGB: NEGATIVE
UROBILINOGEN, URINE: NORMAL
WBC # BLD: 7.9 K/UL (ref 3.5–11)
WBC # BLD: NORMAL 10*3/UL
WBC UA: ABNORMAL /HPF (ref 0–4)
YEAST: ABNORMAL

## 2019-10-03 PROCEDURE — 6360000002 HC RX W HCPCS: Performed by: EMERGENCY MEDICINE

## 2019-10-03 PROCEDURE — 99284 EMERGENCY DEPT VISIT MOD MDM: CPT

## 2019-10-03 PROCEDURE — 74176 CT ABD & PELVIS W/O CONTRAST: CPT

## 2019-10-03 PROCEDURE — 93975 VASCULAR STUDY: CPT

## 2019-10-03 PROCEDURE — 81001 URINALYSIS AUTO W/SCOPE: CPT

## 2019-10-03 PROCEDURE — 80053 COMPREHEN METABOLIC PANEL: CPT

## 2019-10-03 PROCEDURE — 76830 TRANSVAGINAL US NON-OB: CPT

## 2019-10-03 PROCEDURE — 85025 COMPLETE CBC W/AUTO DIFF WBC: CPT

## 2019-10-03 PROCEDURE — 76856 US EXAM PELVIC COMPLETE: CPT

## 2019-10-03 PROCEDURE — 96375 TX/PRO/DX INJ NEW DRUG ADDON: CPT

## 2019-10-03 PROCEDURE — 96374 THER/PROPH/DIAG INJ IV PUSH: CPT

## 2019-10-03 PROCEDURE — 93976 VASCULAR STUDY: CPT

## 2019-10-03 RX ORDER — MORPHINE SULFATE 4 MG/ML
4 INJECTION, SOLUTION INTRAMUSCULAR; INTRAVENOUS ONCE
Status: COMPLETED | OUTPATIENT
Start: 2019-10-03 | End: 2019-10-03

## 2019-10-03 RX ORDER — ONDANSETRON 2 MG/ML
4 INJECTION INTRAMUSCULAR; INTRAVENOUS ONCE
Status: COMPLETED | OUTPATIENT
Start: 2019-10-03 | End: 2019-10-03

## 2019-10-03 RX ORDER — KETOROLAC TROMETHAMINE 30 MG/ML
15 INJECTION, SOLUTION INTRAMUSCULAR; INTRAVENOUS ONCE
Status: COMPLETED | OUTPATIENT
Start: 2019-10-03 | End: 2019-10-03

## 2019-10-03 RX ADMIN — ONDANSETRON 4 MG: 2 INJECTION INTRAMUSCULAR; INTRAVENOUS at 19:41

## 2019-10-03 RX ADMIN — MORPHINE SULFATE 4 MG: 4 INJECTION, SOLUTION INTRAMUSCULAR; INTRAVENOUS at 19:41

## 2019-10-03 RX ADMIN — KETOROLAC TROMETHAMINE: 30 INJECTION, SOLUTION INTRAMUSCULAR at 19:40

## 2019-10-03 ASSESSMENT — PAIN DESCRIPTION - PROGRESSION: CLINICAL_PROGRESSION: NOT CHANGED

## 2019-10-03 ASSESSMENT — PAIN SCALES - GENERAL
PAINLEVEL_OUTOF10: 9
PAINLEVEL_OUTOF10: 9
PAINLEVEL_OUTOF10: 5
PAINLEVEL_OUTOF10: 9

## 2019-10-03 ASSESSMENT — PAIN DESCRIPTION - ONSET: ONSET: SUDDEN

## 2019-10-03 ASSESSMENT — PAIN DESCRIPTION - LOCATION: LOCATION: ABDOMEN

## 2019-10-03 ASSESSMENT — PAIN - FUNCTIONAL ASSESSMENT: PAIN_FUNCTIONAL_ASSESSMENT: PREVENTS OR INTERFERES SOME ACTIVE ACTIVITIES AND ADLS

## 2019-10-03 ASSESSMENT — ENCOUNTER SYMPTOMS
VOMITING: 0
SHORTNESS OF BREATH: 0
COUGH: 0

## 2019-10-03 ASSESSMENT — PAIN DESCRIPTION - DESCRIPTORS: DESCRIPTORS: SHARP

## 2019-10-03 ASSESSMENT — PAIN DESCRIPTION - PAIN TYPE: TYPE: ACUTE PAIN

## 2019-10-03 ASSESSMENT — PAIN DESCRIPTION - FREQUENCY: FREQUENCY: CONTINUOUS

## 2019-10-03 ASSESSMENT — PAIN DESCRIPTION - ORIENTATION: ORIENTATION: LEFT;LOWER

## 2019-10-07 ENCOUNTER — TELEPHONE (OUTPATIENT)
Dept: OBGYN | Age: 49
End: 2019-10-07

## 2019-10-07 ENCOUNTER — OFFICE VISIT (OUTPATIENT)
Dept: OBGYN | Age: 49
End: 2019-10-07
Payer: COMMERCIAL

## 2019-10-07 VITALS
HEART RATE: 72 BPM | SYSTOLIC BLOOD PRESSURE: 118 MMHG | DIASTOLIC BLOOD PRESSURE: 68 MMHG | BODY MASS INDEX: 33.63 KG/M2 | WEIGHT: 197 LBS | HEIGHT: 64 IN | RESPIRATION RATE: 20 BRPM

## 2019-10-07 DIAGNOSIS — Z01.818 PRE-OP TESTING: Primary | ICD-10-CM

## 2019-10-07 DIAGNOSIS — Z87.19 HISTORY OF ABDOMINAL HERNIA: ICD-10-CM

## 2019-10-07 DIAGNOSIS — D25.1 INTRAMURAL LEIOMYOMA OF UTERUS: ICD-10-CM

## 2019-10-07 DIAGNOSIS — R10.2 PELVIC PAIN: Primary | ICD-10-CM

## 2019-10-07 DIAGNOSIS — N83.202 CYST OF LEFT OVARY: ICD-10-CM

## 2019-10-07 PROCEDURE — 99214 OFFICE O/P EST MOD 30 MIN: CPT | Performed by: OBSTETRICS & GYNECOLOGY

## 2019-10-08 ENCOUNTER — PREP FOR PROCEDURE (OUTPATIENT)
Dept: OBGYN | Age: 49
End: 2019-10-08

## 2019-10-08 ENCOUNTER — HOSPITAL ENCOUNTER (OUTPATIENT)
Dept: LAB | Age: 49
Discharge: HOME | End: 2019-10-08
Payer: COMMERCIAL

## 2019-10-08 DIAGNOSIS — Z01.818 PRE-OP TESTING: ICD-10-CM

## 2019-10-08 LAB
ABO/RH: NORMAL
ANTIBODY SCREEN: NEGATIVE
ARM BAND NUMBER: NORMAL
EXPIRATION DATE: NORMAL
HCG QUANTITATIVE: <1 IU/L
HCT VFR BLD CALC: 38.5 % (ref 36–46)
HEMOGLOBIN: 12.8 G/DL (ref 12–16)
MCH RBC QN AUTO: 28.4 PG (ref 26–34)
MCHC RBC AUTO-ENTMCNC: 33.3 G/DL (ref 31–37)
MCV RBC AUTO: 85.2 FL (ref 80–100)
NRBC AUTOMATED: NORMAL PER 100 WBC
PDW BLD-RTO: 13.7 % (ref 11–14.5)
PLATELET # BLD: 331 K/UL (ref 140–450)
PMV BLD AUTO: 8.4 FL (ref 6–12)
RBC # BLD: 4.52 M/UL (ref 4–5.2)
WBC # BLD: 4.6 K/UL (ref 3.5–11)

## 2019-10-08 PROCEDURE — 36415 COLL VENOUS BLD VENIPUNCTURE: CPT

## 2019-10-08 PROCEDURE — 86901 BLOOD TYPING SEROLOGIC RH(D): CPT

## 2019-10-08 PROCEDURE — 86900 BLOOD TYPING SEROLOGIC ABO: CPT

## 2019-10-08 PROCEDURE — 84702 CHORIONIC GONADOTROPIN TEST: CPT

## 2019-10-08 PROCEDURE — 85027 COMPLETE CBC AUTOMATED: CPT

## 2019-10-08 PROCEDURE — 86850 RBC ANTIBODY SCREEN: CPT

## 2019-10-08 RX ORDER — SODIUM CHLORIDE 0.9 % (FLUSH) 0.9 %
10 SYRINGE (ML) INJECTION PRN
Status: CANCELLED | OUTPATIENT
Start: 2019-10-08

## 2019-10-08 RX ORDER — SODIUM CHLORIDE 0.9 % (FLUSH) 0.9 %
10 SYRINGE (ML) INJECTION EVERY 12 HOURS SCHEDULED
Status: CANCELLED | OUTPATIENT
Start: 2019-10-08

## 2019-10-09 ENCOUNTER — ANESTHESIA (OUTPATIENT)
Dept: OPERATING ROOM | Age: 49
End: 2019-10-09
Payer: COMMERCIAL

## 2019-10-09 ENCOUNTER — ANESTHESIA EVENT (OUTPATIENT)
Dept: OPERATING ROOM | Age: 49
End: 2019-10-09
Payer: COMMERCIAL

## 2019-10-09 ENCOUNTER — HOSPITAL ENCOUNTER (OUTPATIENT)
Age: 49
Setting detail: OUTPATIENT SURGERY
Discharge: HOME | End: 2019-10-09
Attending: OBSTETRICS & GYNECOLOGY | Admitting: OBSTETRICS & GYNECOLOGY
Payer: COMMERCIAL

## 2019-10-09 VITALS
RESPIRATION RATE: 17 BRPM | SYSTOLIC BLOOD PRESSURE: 109 MMHG | WEIGHT: 193.38 LBS | HEIGHT: 64 IN | DIASTOLIC BLOOD PRESSURE: 56 MMHG | HEART RATE: 58 BPM | OXYGEN SATURATION: 98 % | TEMPERATURE: 97.2 F | BODY MASS INDEX: 33.02 KG/M2

## 2019-10-09 VITALS
OXYGEN SATURATION: 98 % | DIASTOLIC BLOOD PRESSURE: 81 MMHG | TEMPERATURE: 96.8 F | SYSTOLIC BLOOD PRESSURE: 143 MMHG | RESPIRATION RATE: 16 BRPM

## 2019-10-09 DIAGNOSIS — G89.18 POST-OPERATIVE PAIN: Primary | ICD-10-CM

## 2019-10-09 LAB
CASE NUMBER:: NORMAL
SPECIMEN DESCRIPTION: NORMAL

## 2019-10-09 PROCEDURE — 3700000001 HC ADD 15 MINUTES (ANESTHESIA): Performed by: OBSTETRICS & GYNECOLOGY

## 2019-10-09 PROCEDURE — 58925 REMOVAL OF OVARIAN CYST(S): CPT | Performed by: OBSTETRICS & GYNECOLOGY

## 2019-10-09 PROCEDURE — 3700000000 HC ANESTHESIA ATTENDED CARE: Performed by: OBSTETRICS & GYNECOLOGY

## 2019-10-09 PROCEDURE — 2709999900 HC NON-CHARGEABLE SUPPLY: Performed by: OBSTETRICS & GYNECOLOGY

## 2019-10-09 PROCEDURE — 6370000000 HC RX 637 (ALT 250 FOR IP): Performed by: NURSE ANESTHETIST, CERTIFIED REGISTERED

## 2019-10-09 PROCEDURE — 7100000001 HC PACU RECOVERY - ADDTL 15 MIN: Performed by: OBSTETRICS & GYNECOLOGY

## 2019-10-09 PROCEDURE — 7100000010 HC PHASE II RECOVERY - FIRST 15 MIN: Performed by: OBSTETRICS & GYNECOLOGY

## 2019-10-09 PROCEDURE — 88112 CYTOPATH CELL ENHANCE TECH: CPT

## 2019-10-09 PROCEDURE — 88305 TISSUE EXAM BY PATHOLOGIST: CPT

## 2019-10-09 PROCEDURE — 3600000014 HC SURGERY LEVEL 4 ADDTL 15MIN: Performed by: OBSTETRICS & GYNECOLOGY

## 2019-10-09 PROCEDURE — 6360000002 HC RX W HCPCS: Performed by: NURSE ANESTHETIST, CERTIFIED REGISTERED

## 2019-10-09 PROCEDURE — 3600000004 HC SURGERY LEVEL 4 BASE: Performed by: OBSTETRICS & GYNECOLOGY

## 2019-10-09 PROCEDURE — 2580000003 HC RX 258: Performed by: OBSTETRICS & GYNECOLOGY

## 2019-10-09 PROCEDURE — 7100000011 HC PHASE II RECOVERY - ADDTL 15 MIN: Performed by: OBSTETRICS & GYNECOLOGY

## 2019-10-09 PROCEDURE — 2500000003 HC RX 250 WO HCPCS: Performed by: OBSTETRICS & GYNECOLOGY

## 2019-10-09 PROCEDURE — 2500000003 HC RX 250 WO HCPCS: Performed by: NURSE ANESTHETIST, CERTIFIED REGISTERED

## 2019-10-09 PROCEDURE — 7100000000 HC PACU RECOVERY - FIRST 15 MIN: Performed by: OBSTETRICS & GYNECOLOGY

## 2019-10-09 PROCEDURE — 6360000002 HC RX W HCPCS: Performed by: OBSTETRICS & GYNECOLOGY

## 2019-10-09 RX ORDER — ROCURONIUM BROMIDE 10 MG/ML
INJECTION, SOLUTION INTRAVENOUS PRN
Status: DISCONTINUED | OUTPATIENT
Start: 2019-10-09 | End: 2019-10-09 | Stop reason: SDUPTHER

## 2019-10-09 RX ORDER — SODIUM CHLORIDE 0.9 % (FLUSH) 0.9 %
10 SYRINGE (ML) INJECTION PRN
Status: DISCONTINUED | OUTPATIENT
Start: 2019-10-09 | End: 2019-10-09 | Stop reason: HOSPADM

## 2019-10-09 RX ORDER — SODIUM CHLORIDE 0.9 % (FLUSH) 0.9 %
10 SYRINGE (ML) INJECTION EVERY 12 HOURS SCHEDULED
Status: CANCELLED | OUTPATIENT
Start: 2019-10-09

## 2019-10-09 RX ORDER — OXYCODONE HYDROCHLORIDE AND ACETAMINOPHEN 5; 325 MG/1; MG/1
2 TABLET ORAL EVERY 4 HOURS PRN
Status: CANCELLED | OUTPATIENT
Start: 2019-10-09

## 2019-10-09 RX ORDER — HYDROCODONE BITARTRATE AND ACETAMINOPHEN 5; 325 MG/1; MG/1
1 TABLET ORAL EVERY 8 HOURS PRN
Qty: 15 TABLET | Refills: 0 | Status: SHIPPED | OUTPATIENT
Start: 2019-10-09 | End: 2019-10-14

## 2019-10-09 RX ORDER — DEXAMETHASONE SODIUM PHOSPHATE 10 MG/ML
INJECTION INTRAMUSCULAR; INTRAVENOUS PRN
Status: DISCONTINUED | OUTPATIENT
Start: 2019-10-09 | End: 2019-10-09 | Stop reason: SDUPTHER

## 2019-10-09 RX ORDER — BUPIVACAINE HYDROCHLORIDE 5 MG/ML
INJECTION, SOLUTION EPIDURAL; INTRACAUDAL PRN
Status: DISCONTINUED | OUTPATIENT
Start: 2019-10-09 | End: 2019-10-09 | Stop reason: ALTCHOICE

## 2019-10-09 RX ORDER — ACETAMINOPHEN 500 MG
TABLET ORAL PRN
Status: DISCONTINUED | OUTPATIENT
Start: 2019-10-09 | End: 2019-10-09 | Stop reason: SDUPTHER

## 2019-10-09 RX ORDER — OXYCODONE HYDROCHLORIDE AND ACETAMINOPHEN 5; 325 MG/1; MG/1
1 TABLET ORAL EVERY 4 HOURS PRN
Status: CANCELLED | OUTPATIENT
Start: 2019-10-09

## 2019-10-09 RX ORDER — ACETAMINOPHEN 325 MG/1
650 TABLET ORAL EVERY 4 HOURS PRN
Status: CANCELLED | OUTPATIENT
Start: 2019-10-09

## 2019-10-09 RX ORDER — MIDAZOLAM HYDROCHLORIDE 1 MG/ML
INJECTION INTRAMUSCULAR; INTRAVENOUS PRN
Status: DISCONTINUED | OUTPATIENT
Start: 2019-10-09 | End: 2019-10-09 | Stop reason: SDUPTHER

## 2019-10-09 RX ORDER — PROPOFOL 10 MG/ML
INJECTION, EMULSION INTRAVENOUS PRN
Status: DISCONTINUED | OUTPATIENT
Start: 2019-10-09 | End: 2019-10-09 | Stop reason: SDUPTHER

## 2019-10-09 RX ORDER — SODIUM CHLORIDE 0.9 % (FLUSH) 0.9 %
10 SYRINGE (ML) INJECTION PRN
Status: CANCELLED | OUTPATIENT
Start: 2019-10-09

## 2019-10-09 RX ORDER — KETOROLAC TROMETHAMINE 30 MG/ML
INJECTION, SOLUTION INTRAMUSCULAR; INTRAVENOUS PRN
Status: DISCONTINUED | OUTPATIENT
Start: 2019-10-09 | End: 2019-10-09 | Stop reason: SDUPTHER

## 2019-10-09 RX ORDER — SODIUM CHLORIDE, SODIUM LACTATE, POTASSIUM CHLORIDE, CALCIUM CHLORIDE 600; 310; 30; 20 MG/100ML; MG/100ML; MG/100ML; MG/100ML
INJECTION, SOLUTION INTRAVENOUS CONTINUOUS
Status: DISCONTINUED | OUTPATIENT
Start: 2019-10-09 | End: 2019-10-09 | Stop reason: HOSPADM

## 2019-10-09 RX ORDER — GLYCOPYRROLATE 1 MG/5 ML
SYRINGE (ML) INTRAVENOUS PRN
Status: DISCONTINUED | OUTPATIENT
Start: 2019-10-09 | End: 2019-10-09 | Stop reason: SDUPTHER

## 2019-10-09 RX ORDER — ONDANSETRON 2 MG/ML
INJECTION INTRAMUSCULAR; INTRAVENOUS PRN
Status: DISCONTINUED | OUTPATIENT
Start: 2019-10-09 | End: 2019-10-09 | Stop reason: SDUPTHER

## 2019-10-09 RX ORDER — GABAPENTIN 800 MG/1
TABLET ORAL PRN
Status: DISCONTINUED | OUTPATIENT
Start: 2019-10-09 | End: 2019-10-09 | Stop reason: SDUPTHER

## 2019-10-09 RX ORDER — FENTANYL CITRATE 50 UG/ML
INJECTION, SOLUTION INTRAMUSCULAR; INTRAVENOUS PRN
Status: DISCONTINUED | OUTPATIENT
Start: 2019-10-09 | End: 2019-10-09 | Stop reason: SDUPTHER

## 2019-10-09 RX ORDER — ONDANSETRON 2 MG/ML
4 INJECTION INTRAMUSCULAR; INTRAVENOUS EVERY 8 HOURS PRN
Status: CANCELLED | OUTPATIENT
Start: 2019-10-09

## 2019-10-09 RX ORDER — SCOLOPAMINE TRANSDERMAL SYSTEM 1 MG/1
PATCH, EXTENDED RELEASE TRANSDERMAL PRN
Status: DISCONTINUED | OUTPATIENT
Start: 2019-10-09 | End: 2019-10-09 | Stop reason: SDUPTHER

## 2019-10-09 RX ORDER — NEOSTIGMINE METHYLSULFATE 1 MG/ML
INJECTION, SOLUTION INTRAVENOUS PRN
Status: DISCONTINUED | OUTPATIENT
Start: 2019-10-09 | End: 2019-10-09 | Stop reason: SDUPTHER

## 2019-10-09 RX ORDER — LABETALOL HYDROCHLORIDE 5 MG/ML
INJECTION, SOLUTION INTRAVENOUS PRN
Status: DISCONTINUED | OUTPATIENT
Start: 2019-10-09 | End: 2019-10-09 | Stop reason: SDUPTHER

## 2019-10-09 RX ORDER — LIDOCAINE HYDROCHLORIDE 20 MG/ML
INJECTION, SOLUTION EPIDURAL; INFILTRATION; INTRACAUDAL; PERINEURAL PRN
Status: DISCONTINUED | OUTPATIENT
Start: 2019-10-09 | End: 2019-10-09 | Stop reason: SDUPTHER

## 2019-10-09 RX ORDER — SODIUM CHLORIDE 0.9 % (FLUSH) 0.9 %
10 SYRINGE (ML) INJECTION EVERY 12 HOURS SCHEDULED
Status: DISCONTINUED | OUTPATIENT
Start: 2019-10-09 | End: 2019-10-09 | Stop reason: HOSPADM

## 2019-10-09 RX ADMIN — KETOROLAC TROMETHAMINE 30 MG: 30 INJECTION, SOLUTION INTRAMUSCULAR; INTRAVENOUS at 08:40

## 2019-10-09 RX ADMIN — GABAPENTIN 800 MG: 800 TABLET, FILM COATED ORAL at 07:38

## 2019-10-09 RX ADMIN — DEXAMETHASONE SODIUM PHOSPHATE 10 MG: 10 INJECTION INTRAMUSCULAR; INTRAVENOUS at 08:00

## 2019-10-09 RX ADMIN — ACETAMINOPHEN 1000 MG: 500 TABLET, FILM COATED ORAL at 07:38

## 2019-10-09 RX ADMIN — ROCURONIUM BROMIDE 50 MG: 10 INJECTION, SOLUTION INTRAVENOUS at 07:47

## 2019-10-09 RX ADMIN — SODIUM CHLORIDE, POTASSIUM CHLORIDE, SODIUM LACTATE AND CALCIUM CHLORIDE: 600; 310; 30; 20 INJECTION, SOLUTION INTRAVENOUS at 08:31

## 2019-10-09 RX ADMIN — Medication 0.6 MG: at 08:40

## 2019-10-09 RX ADMIN — MIDAZOLAM HYDROCHLORIDE 2 MG: 1 INJECTION, SOLUTION INTRAMUSCULAR; INTRAVENOUS at 07:38

## 2019-10-09 RX ADMIN — Medication 2 G: at 08:00

## 2019-10-09 RX ADMIN — SODIUM CHLORIDE, POTASSIUM CHLORIDE, SODIUM LACTATE AND CALCIUM CHLORIDE: 600; 310; 30; 20 INJECTION, SOLUTION INTRAVENOUS at 07:20

## 2019-10-09 RX ADMIN — Medication 1 MG: at 08:47

## 2019-10-09 RX ADMIN — ONDANSETRON 4 MG: 2 INJECTION INTRAMUSCULAR; INTRAVENOUS at 07:43

## 2019-10-09 RX ADMIN — SCOPALAMINE 1 PATCH: 1 PATCH, EXTENDED RELEASE TRANSDERMAL at 07:38

## 2019-10-09 RX ADMIN — LIDOCAINE HYDROCHLORIDE 60 MG: 20 INJECTION, SOLUTION EPIDURAL; INFILTRATION; INTRACAUDAL; PERINEURAL at 07:47

## 2019-10-09 RX ADMIN — Medication 3 MG: at 08:40

## 2019-10-09 RX ADMIN — PROPOFOL 25 MG: 10 INJECTION, EMULSION INTRAVENOUS at 08:45

## 2019-10-09 RX ADMIN — LABETALOL HYDROCHLORIDE 5 MG: 5 INJECTION INTRAVENOUS at 08:32

## 2019-10-09 RX ADMIN — LABETALOL HYDROCHLORIDE 5 MG: 5 INJECTION INTRAVENOUS at 08:30

## 2019-10-09 RX ADMIN — ROCURONIUM BROMIDE 10 MG: 10 INJECTION, SOLUTION INTRAVENOUS at 08:28

## 2019-10-09 RX ADMIN — PROPOFOL 150 MG: 10 INJECTION, EMULSION INTRAVENOUS at 07:47

## 2019-10-09 RX ADMIN — FENTANYL CITRATE 100 MCG: 50 INJECTION, SOLUTION INTRAMUSCULAR; INTRAVENOUS at 07:43

## 2019-10-09 ASSESSMENT — PAIN DESCRIPTION - PAIN TYPE
TYPE: SURGICAL PAIN

## 2019-10-09 ASSESSMENT — PULMONARY FUNCTION TESTS
PIF_VALUE: 27
PIF_VALUE: 22
PIF_VALUE: 18
PIF_VALUE: 32
PIF_VALUE: 18
PIF_VALUE: 17
PIF_VALUE: 18
PIF_VALUE: 12
PIF_VALUE: 30
PIF_VALUE: 1
PIF_VALUE: 17
PIF_VALUE: 25
PIF_VALUE: 1
PIF_VALUE: 18
PIF_VALUE: 3
PIF_VALUE: 17
PIF_VALUE: 10
PIF_VALUE: 31
PIF_VALUE: 15
PIF_VALUE: 32
PIF_VALUE: 29
PIF_VALUE: 30
PIF_VALUE: 20
PIF_VALUE: 13
PIF_VALUE: 30
PIF_VALUE: 17
PIF_VALUE: 6
PIF_VALUE: 30
PIF_VALUE: 0
PIF_VALUE: 26
PIF_VALUE: 26
PIF_VALUE: 30
PIF_VALUE: 29
PIF_VALUE: 15
PIF_VALUE: 18
PIF_VALUE: 31
PIF_VALUE: 30
PIF_VALUE: 17
PIF_VALUE: 27
PIF_VALUE: 30
PIF_VALUE: 32
PIF_VALUE: 29
PIF_VALUE: 30
PIF_VALUE: 32
PIF_VALUE: 33
PIF_VALUE: 32
PIF_VALUE: 18
PIF_VALUE: 29
PIF_VALUE: 18
PIF_VALUE: 31
PIF_VALUE: 2
PIF_VALUE: 21
PIF_VALUE: 2
PIF_VALUE: 23
PIF_VALUE: 19
PIF_VALUE: 18
PIF_VALUE: 30
PIF_VALUE: 0
PIF_VALUE: 18
PIF_VALUE: 6
PIF_VALUE: 30
PIF_VALUE: 9
PIF_VALUE: 20
PIF_VALUE: 22
PIF_VALUE: 0
PIF_VALUE: 28
PIF_VALUE: 18
PIF_VALUE: 1
PIF_VALUE: 18

## 2019-10-09 ASSESSMENT — PAIN SCALES - GENERAL
PAINLEVEL_OUTOF10: 1
PAINLEVEL_OUTOF10: 0
PAINLEVEL_OUTOF10: 1
PAINLEVEL_OUTOF10: 0
PAINLEVEL_OUTOF10: 1

## 2019-10-09 ASSESSMENT — PAIN DESCRIPTION - DESCRIPTORS
DESCRIPTORS: ACHING
DESCRIPTORS: ACHING;DISCOMFORT
DESCRIPTORS: DISCOMFORT
DESCRIPTORS: DISCOMFORT
DESCRIPTORS: ACHING
DESCRIPTORS: ACHING
DESCRIPTORS: DISCOMFORT
DESCRIPTORS: DISCOMFORT

## 2019-10-09 ASSESSMENT — PAIN DESCRIPTION - LOCATION
LOCATION: ABDOMEN

## 2019-10-09 ASSESSMENT — PAIN DESCRIPTION - FREQUENCY
FREQUENCY: CONTINUOUS

## 2019-10-09 ASSESSMENT — PAIN DESCRIPTION - ORIENTATION
ORIENTATION: LEFT
ORIENTATION: LEFT;LOWER
ORIENTATION: LOWER;LEFT
ORIENTATION: MID;LOWER
ORIENTATION: LEFT;LOWER
ORIENTATION: LEFT;LOWER
ORIENTATION: LOWER;LEFT

## 2019-10-09 ASSESSMENT — PAIN DESCRIPTION - ONSET
ONSET: ON-GOING

## 2019-10-09 ASSESSMENT — PAIN DESCRIPTION - PROGRESSION
CLINICAL_PROGRESSION: NOT CHANGED
CLINICAL_PROGRESSION: NOT CHANGED

## 2019-10-09 ASSESSMENT — PAIN - FUNCTIONAL ASSESSMENT: PAIN_FUNCTIONAL_ASSESSMENT: 0-10

## 2019-10-10 LAB
SURGICAL PATHOLOGY REPORT: NORMAL
SURGICAL PATHOLOGY REPORT: NORMAL

## 2019-10-21 ENCOUNTER — OFFICE VISIT (OUTPATIENT)
Dept: OBGYN | Age: 49
End: 2019-10-21

## 2019-10-21 VITALS
DIASTOLIC BLOOD PRESSURE: 78 MMHG | HEIGHT: 64 IN | WEIGHT: 191 LBS | RESPIRATION RATE: 20 BRPM | BODY MASS INDEX: 32.61 KG/M2 | SYSTOLIC BLOOD PRESSURE: 118 MMHG | HEART RATE: 78 BPM

## 2019-10-21 DIAGNOSIS — Z87.42 HISTORY OF OVARIAN CYST: ICD-10-CM

## 2019-10-21 DIAGNOSIS — Z09 POSTOP CHECK: Primary | ICD-10-CM

## 2019-10-21 PROCEDURE — 99024 POSTOP FOLLOW-UP VISIT: CPT | Performed by: OBSTETRICS & GYNECOLOGY

## 2019-10-23 ENCOUNTER — PATIENT MESSAGE (OUTPATIENT)
Dept: OBGYN | Age: 49
End: 2019-10-23

## 2019-12-31 ENCOUNTER — HOSPITAL ENCOUNTER (OUTPATIENT)
Dept: ULTRASOUND IMAGING | Age: 49
Discharge: HOME | End: 2020-01-02
Payer: COMMERCIAL

## 2019-12-31 PROCEDURE — 76830 TRANSVAGINAL US NON-OB: CPT

## 2019-12-31 PROCEDURE — 76856 US EXAM PELVIC COMPLETE: CPT

## 2020-03-10 ENCOUNTER — TELEPHONE (OUTPATIENT)
Dept: MAMMOGRAPHY | Age: 50
End: 2020-03-10

## 2020-03-26 ENCOUNTER — OFFICE VISIT (OUTPATIENT)
Dept: PODIATRY | Age: 50
End: 2020-03-26
Payer: COMMERCIAL

## 2020-03-26 VITALS
BODY MASS INDEX: 32.79 KG/M2 | HEART RATE: 74 BPM | SYSTOLIC BLOOD PRESSURE: 118 MMHG | DIASTOLIC BLOOD PRESSURE: 70 MMHG | HEIGHT: 64 IN

## 2020-03-26 PROCEDURE — 99213 OFFICE O/P EST LOW 20 MIN: CPT | Performed by: PODIATRIST

## 2020-03-26 PROCEDURE — 20550 NJX 1 TENDON SHEATH/LIGAMENT: CPT | Performed by: PODIATRIST

## 2020-03-26 RX ORDER — BETAMETHASONE SODIUM PHOSPHATE AND BETAMETHASONE ACETATE 3; 3 MG/ML; MG/ML
6 INJECTION, SUSPENSION INTRA-ARTICULAR; INTRALESIONAL; INTRAMUSCULAR; SOFT TISSUE ONCE
Status: COMPLETED | OUTPATIENT
Start: 2020-03-26 | End: 2020-03-26

## 2020-03-26 RX ADMIN — BETAMETHASONE SODIUM PHOSPHATE AND BETAMETHASONE ACETATE 6 MG: 3; 3 INJECTION, SUSPENSION INTRA-ARTICULAR; INTRALESIONAL; INTRAMUSCULAR; SOFT TISSUE at 10:08

## 2020-03-26 NOTE — PROGRESS NOTES
Subjective:    Sheng Clark is a 52 y.o. female who presents to the office today complaining of Bilateral heel pain. L much worse than R. Symptoms began  week(s) ago. More intense past few days. Patient relates pain is Present upon arising from bed in the morning and after periods of rest and then standing. The pain progresses throughout the day. Pain is rated 7 out of 10 and is described as severe. Treatments prior to today's visit include: insoles, stretching. Pt also has numb weird sensaitons top of L foot. Feels it when she puts her shoe on. No tx tried no real pain at that location. Currently denies F/C/N/V. No Known Allergies    Past Medical History:   Diagnosis Date    Anemia     Anemia     Headache     Hyperlipidemia     Obesity     Had Gastric Bypass 2003       Prior to Admission medications    Medication Sig Start Date End Date Taking?  Authorizing Provider   ibuprofen (ADVIL;MOTRIN) 800 MG tablet Take 800 mg by mouth 5/14/17  Yes Historical Provider, MD   magnesium oxide (MAG-OX) 400 MG tablet Take 400 mg by mouth daily   Yes Historical Provider, MD   calcium carbonate 600 MG TABS tablet Take 1 tablet by mouth daily   Yes Historical Provider, MD   Multiple Vitamins-Minerals (MULTIVITAMIN GUMMIES ADULT PO) Take by mouth   Yes Historical Provider, MD   Multiple Vitamins-Minerals (MULTIVITAMIN ADULT PO) Take 1 capsule by mouth   Yes Historical Provider, MD       Past Surgical History:   Procedure Laterality Date    DILATION AND CURETTAGE  12/2010    Endometrial Ablation    ENDOMETRIAL ABLATION  12/2010    GASTRIC BYPASS SURGERY  2003   [address]  [address]    HYSTEROSCOPY  12/2010    with Endometrial Ablation     LITHOTRIPSY Bilateral     OVARIAN CYST REMOVAL Left 10/9/2019    Laparoscopic Left Ovarian Cystectomy performed by Caprice Isaacs MD at [facility] EXTRACTION         Family History   Problem Relation Age of Onset    Cancer Maternal Uncle         Hodgkin's bulge is absent Bilateral.  Pes abductus is absent Bilateral.  Early toe off absent Bilateral.  Limb length discrepancy absent. Asessment: Patient is a 52 y.o. female with:    Diagnosis Orders   1. Plantar fasciitis of left foot  MS INJECT TENDON SHEATH/LIGAMENT   2. Neuritis of left foot     3. Pain of left heel  MS INJECT TENDON SHEATH/LIGAMENT       Plan: Patient examined and evaluated. Current condition and treatment options discussed in detail. Patient was given plantar fasciitis instruction sheet. Patient will start stretching, icing, anti-inflammatory, and arch supports in shoe gear 100% of the time WB. Patient will not go barefoot. Pt has night splint at home. Use qhs. Verbal consent obtained from patient for Left heel injection after all questions answered. Left heel palpated medially and most tender location adjacent to the medial calcaneal tubercle identified. This area was prepped with an alcohol swab and 0.5 cc of 1%lidocaine plain and 1 cc of celestone was injected to the Left heel. A band-aid was applied, patient advised of possible local steroid reaction symptoms, and patient instructed to limit activities to this area for 24 hours. Other tx options for neuritis discussed. Shoe modificaitons discussed. Pt will let us know if pain would worsen and will look into further tx options at that time. Contact office with any questions/problems/concerns. Return to office in 3 week(s).

## 2020-03-26 NOTE — PROGRESS NOTES
Celestone 6mg/ml given by dr Cassy Roper in left heel   ndc [id number] lot [id number] exp date 09/31/2020

## 2020-04-14 ENCOUNTER — OFFICE VISIT (OUTPATIENT)
Dept: PODIATRY | Age: 50
End: 2020-04-14
Payer: COMMERCIAL

## 2020-04-14 VITALS
HEART RATE: 68 BPM | SYSTOLIC BLOOD PRESSURE: 122 MMHG | HEIGHT: 64 IN | BODY MASS INDEX: 32.79 KG/M2 | DIASTOLIC BLOOD PRESSURE: 72 MMHG

## 2020-04-14 PROCEDURE — 99213 OFFICE O/P EST LOW 20 MIN: CPT | Performed by: PODIATRIST

## 2020-04-14 RX ORDER — METHYLPREDNISOLONE 4 MG/1
TABLET ORAL
Qty: 1 KIT | Refills: 0 | Status: SHIPPED | OUTPATIENT
Start: 2020-04-14 | End: 2021-11-17

## 2020-04-14 NOTE — PROGRESS NOTES
Met Breast cancer    Other Mother         fluid around heart        Social History     Tobacco Use    Smoking status: Never Smoker    Smokeless tobacco: Never Used   Substance Use Topics    Alcohol use: No       ROS: All 14 ROS systems reviewed and pertinent positives noted above, all others negative. Lower Extremity Physical Examination:     Vitals:   Vitals:    04/14/20 1451   BP: 122/72   Pulse: 68     General: AAO x 3 in NAD. Vascular: DP and PT pulses palpable 2/4, bilateral.  CFT <3 seconds, bilateral.  Hair growth present to the level of the digits, bilateral.  Edema absent, bilateral.  Varicosities absent, bilateral. Erythema absent, bilateral. Distal Rubor absent bilateral.  Temperature within normal limits bilateral. Hyperpigmentation absent bilateral. No atrophic skin. Neurological: Sensation intact to light touch to level of digits, bilateral.  Protective sensation intact 10/10 sites via 5.07/10g [place] Monofilament, bilateral.  negative Tinel's, bilateral.  negative Valleix sign, bilateral.  Vibratory intact bilateral.  Reflexes Decreased bilateral.  Paresthesias positive dorsal medial forefoot to hallux. Dysthesias negative. Sharp/dull intact bilateral.  Musculoskeletal: Muscle strength 5/5, Bilateral.  Pain present upon palpation of medial calcaneal tubercle, b/l. within normal limits medial longitudinal arch, Bilateral.  Ankle ROM decreased,Bilateral.  1st MPJ ROM within normal limits, Bilateral.  Dorsally contracted digits absent digits , Bilateral. Other foot deformities none. Integument: Warm, dry, supple, bilateral.  Open lesion absent, bilateral.  Interdigital maceration absent to web spaces bilateral.  Nails within normal limits. Fissures absent, bilateral. Hyperkeratotic tissue is absent. Asessment: Patient is a 52 y.o. female with:    Diagnosis Orders   1. Plantar fasciitis of left foot     2. Pain of left heel     3.  Plantar fasciitis of right foot Plan: Patient examined and evaluated. Current condition and treatment options discussed in detail. Patient was given plantar fasciitis instruction sheet. Patient will start stretching, icing, anti-inflammatory, and arch supports in shoe gear 100% of the time WB. Patient will not go barefoot. Pt has night splint at home. Use qhs. Orders Placed This Encounter   Medications    methylPREDNISolone (MEDROL DOSEPACK) 4 MG tablet     Sig: Take by mouth. Dispense:  1 kit     Refill:  0     Codes given for custom orthotics. Pt will contact their insurance company and consider this option to treat the deformity/pain. Pt was educated on how this can provide benefit long term. Pt to find otc anti inflammatory cream.  Use PRN  Contact office with any questions/problems/concerns. Return to office in 3 week(s).

## 2021-03-08 ENCOUNTER — TELEPHONE (OUTPATIENT)
Dept: OBGYN | Age: 51
End: 2021-03-08

## 2021-03-08 NOTE — TELEPHONE ENCOUNTER
Patient contacted regarding over due mammogram. Transferred to schedule line.  Order extended x 1 month

## 2021-03-08 NOTE — TELEPHONE ENCOUNTER
Wants Iron checked and whatever else you may want to check. She is anemic and is very tired and pale and chewing ice again.

## 2021-03-09 NOTE — TELEPHONE ENCOUNTER
She wanted to know if you could order them and she have them done before coming in so the two of you could discuss.

## 2021-03-11 DIAGNOSIS — R53.83 OTHER FATIGUE: Primary | ICD-10-CM

## 2021-03-17 ENCOUNTER — HOSPITAL ENCOUNTER (OUTPATIENT)
Dept: MAMMOGRAPHY | Age: 51
Discharge: HOME | End: 2021-03-19
Payer: COMMERCIAL

## 2021-03-17 ENCOUNTER — HOSPITAL ENCOUNTER (OUTPATIENT)
Dept: LAB | Age: 51
Discharge: HOME | End: 2021-03-17
Payer: COMMERCIAL

## 2021-03-17 DIAGNOSIS — Z12.39 SCREENING FOR MALIGNANT NEOPLASM OF BREAST: ICD-10-CM

## 2021-03-17 DIAGNOSIS — R53.83 OTHER FATIGUE: ICD-10-CM

## 2021-03-17 LAB
ABSOLUTE EOS #: 0.1 K/UL (ref 0–0.44)
ABSOLUTE IMMATURE GRANULOCYTE: <0.03 K/UL (ref 0–0.3)
ABSOLUTE LYMPH #: 1.51 K/UL (ref 1.1–3.7)
ABSOLUTE MONO #: 0.65 K/UL (ref 0.1–1.2)
BASOPHILS # BLD: 1 % (ref 0–2)
BASOPHILS ABSOLUTE: 0.04 K/UL (ref 0–0.2)
DIFFERENTIAL TYPE: ABNORMAL
EOSINOPHILS RELATIVE PERCENT: 1 % (ref 1–4)
HCT VFR BLD CALC: 36.5 % (ref 36.3–47.1)
HEMOGLOBIN: 11.3 G/DL (ref 11.9–15.1)
IMMATURE GRANULOCYTES: 0 %
LYMPHOCYTES # BLD: 22 % (ref 24–43)
MCH RBC QN AUTO: 25.8 PG (ref 25.2–33.5)
MCHC RBC AUTO-ENTMCNC: 31 G/DL (ref 25.2–33.5)
MCV RBC AUTO: 83.3 FL (ref 82.6–102.9)
MONOCYTES # BLD: 9 % (ref 3–12)
NRBC AUTOMATED: 0 PER 100 WBC
PDW BLD-RTO: 14.4 % (ref 11.8–14.4)
PLATELET # BLD: 315 K/UL (ref 138–453)
PLATELET ESTIMATE: ABNORMAL
PMV BLD AUTO: 9.8 FL (ref 8.1–13.5)
RBC # BLD: 4.38 M/UL (ref 3.95–5.11)
RBC # BLD: ABNORMAL 10*6/UL
SEG NEUTROPHILS: 67 % (ref 36–65)
SEGMENTED NEUTROPHILS ABSOLUTE COUNT: 4.66 K/UL (ref 1.5–8.1)
WBC # BLD: 7 K/UL (ref 3.5–11.3)
WBC # BLD: ABNORMAL 10*3/UL

## 2021-03-17 PROCEDURE — 36415 COLL VENOUS BLD VENIPUNCTURE: CPT

## 2021-03-17 PROCEDURE — 77063 BREAST TOMOSYNTHESIS BI: CPT

## 2021-03-17 PROCEDURE — 85025 COMPLETE CBC W/AUTO DIFF WBC: CPT

## 2021-11-03 ENCOUNTER — OFFICE VISIT (OUTPATIENT)
Dept: OBGYN | Age: 51
End: 2021-11-03
Payer: COMMERCIAL

## 2021-11-03 ENCOUNTER — HOSPITAL ENCOUNTER (OUTPATIENT)
Age: 51
Setting detail: SPECIMEN
Discharge: HOME | End: 2021-11-03
Payer: COMMERCIAL

## 2021-11-03 VITALS
WEIGHT: 204.2 LBS | SYSTOLIC BLOOD PRESSURE: 114 MMHG | HEIGHT: 64 IN | DIASTOLIC BLOOD PRESSURE: 80 MMHG | OXYGEN SATURATION: 98 % | BODY MASS INDEX: 34.86 KG/M2 | HEART RATE: 80 BPM

## 2021-11-03 DIAGNOSIS — Z12.31 ENCOUNTER FOR SCREENING MAMMOGRAM FOR MALIGNANT NEOPLASM OF BREAST: ICD-10-CM

## 2021-11-03 DIAGNOSIS — Z12.4 SCREENING FOR MALIGNANT NEOPLASM OF CERVIX: ICD-10-CM

## 2021-11-03 DIAGNOSIS — Z01.419 WOMEN'S ANNUAL ROUTINE GYNECOLOGICAL EXAMINATION: Primary | ICD-10-CM

## 2021-11-03 DIAGNOSIS — Z76.89 ENCOUNTER TO ESTABLISH CARE: ICD-10-CM

## 2021-11-03 DIAGNOSIS — Z00.00 HEALTHCARE MAINTENANCE: ICD-10-CM

## 2021-11-03 DIAGNOSIS — M25.541 ARTHRALGIA OF BOTH HANDS: ICD-10-CM

## 2021-11-03 DIAGNOSIS — M25.542 ARTHRALGIA OF BOTH HANDS: ICD-10-CM

## 2021-11-03 PROCEDURE — 99396 PREV VISIT EST AGE 40-64: CPT | Performed by: ADVANCED PRACTICE MIDWIFE

## 2021-11-03 PROCEDURE — G0145 SCR C/V CYTO,THINLAYER,RESCR: HCPCS

## 2021-11-03 PROCEDURE — 87624 HPV HI-RISK TYP POOLED RSLT: CPT

## 2021-11-03 ASSESSMENT — PATIENT HEALTH QUESTIONNAIRE - PHQ9
SUM OF ALL RESPONSES TO PHQ QUESTIONS 1-9: 0
2. FEELING DOWN, DEPRESSED OR HOPELESS: 0
SUM OF ALL RESPONSES TO PHQ QUESTIONS 1-9: 0
SUM OF ALL RESPONSES TO PHQ9 QUESTIONS 1 & 2: 0
SUM OF ALL RESPONSES TO PHQ QUESTIONS 1-9: 0
1. LITTLE INTEREST OR PLEASURE IN DOING THINGS: 0

## 2021-11-03 ASSESSMENT — ENCOUNTER SYMPTOMS
RESPIRATORY NEGATIVE: 1
GASTROINTESTINAL NEGATIVE: 1
EYES NEGATIVE: 1

## 2021-11-03 NOTE — PROGRESS NOTES
Subjective:      Patient ID: Tisha Rios  is a 46 y.o. y.o. female. Soha Spencer presents today for an annual examination. She reports monthly menses, bleeding for 4 days requiring an overnight pad daily and clots the size of a quarter, No menstrual pain. She is sexually active and reports occasional pain and bleeding with intercourse. She also reports need to establish care with a new PCP as hers has retired. She reports pain in the joints of her hands and she is concerned about finger deformity and how it may affect her job. Review of Systems   Constitutional: Negative. HENT: Negative. Eyes: Negative. Respiratory: Negative. Cardiovascular: Negative. Gastrointestinal: Negative. Genitourinary: Negative. Musculoskeletal: Negative. Skin: Negative. Neurological: Negative. Psychiatric/Behavioral: Negative. Breast ROS: negative    Objective:   /80 (Site: Right Upper Arm, Position: Sitting, Cuff Size: Medium Adult)   Pulse 80   Ht 5' 4\" (1.626 m)   Wt 204 lb 3.2 oz (92.6 kg)   LMP 10/27/2021 (Exact Date)   SpO2 98%   Breastfeeding No   BMI 35.05 kg/m²   Physical Exam  Constitutional:       Appearance: She is well-developed. She is obese. HENT:      Head: Normocephalic and atraumatic. Eyes:      Conjunctiva/sclera: Conjunctivae normal.   Cardiovascular:      Rate and Rhythm: Normal rate and regular rhythm. Heart sounds: Normal heart sounds. Pulmonary:      Effort: Pulmonary effort is normal.      Breath sounds: Normal breath sounds. Chest:      Breasts: Breasts are symmetrical.         Right: No inverted nipple, mass, nipple discharge, skin change or tenderness. Left: No inverted nipple, mass, nipple discharge, skin change or tenderness. Abdominal:      Palpations: Abdomen is soft. Genitourinary:     General: Normal vulva. Vagina: Normal.      Comments: External genitalia WNL, no lesions noted.  Vaginal canal is pink with rugae present and normal appearing nonodorous discharge. Cervix is parous, freely mobile and nontender. Uterus is Bulky, midposition and nontender. Uterus is NSSAVNT, adnexa are small and nontender. Cervix low in vaginal canal, mild uterine descensus with bearing down efforts and mild cystocele. Musculoskeletal:         General: Normal range of motion. Cervical back: Normal range of motion and neck supple. Skin:     General: Skin is warm and dry. Neurological:      Mental Status: She is alert and oriented to person, place, and time. Deep Tendon Reflexes: Reflexes are normal and symmetric. Psychiatric:         Mood and Affect: Mood normal.         Thought Content: Thought content normal.         Sexually active: Yes  Any bleeding or pain withintercourse: Yes  Last Yearly:  2020  Last Pap3/2019 Negative PAM  Last Mammogram: 3/2021 BiRads 1  Negative H/O Abnormal pap smear  Last Dexascan No previous  Do you do self breast exams: Yes  Paternal Aunt Breast Ca diagnosed age 42's. Assessment:      Diagnosis Orders   1. Women's annual routine gynecological examination     2. Screening for malignant neoplasm of cervix  PAP SMEAR   3. Encounter for screening mammogram for malignant neoplasm of breast  ABRAHAN ANGELY DIGITAL SCREEN BILATERAL   4. Healthcare maintenance  PAP SMEAR    ABRAHAN ANGELY DIGITAL SCREEN BILATERAL    Hemoglobin A1C    CBC Auto Differential    TSH    T4, Free    Comprehensive Metabolic Panel    Lipid Panel    Dionna Upton NP, Family Medicine, [place]   5. Arthralgia of both hands  KHANH    Sedimentation Rate    Rheumatoid Factor   6.  Encounter to establish care  Tala Wu NP, Family Medicine, [place]           Plan:      Orders Placed This Encounter   Procedures    ABRAHAN ANGELY DIGITAL SCREEN BILATERAL     Standing Status:   Future     Standing Expiration Date:   1/3/2023     Order Specific Question:   Reason for exam:     Answer:   screening    PAP SMEAR     Patient History:    Patient's last menstrual period was 10/27/2021 (exact date). OBGYN Status: Having periods  Past Surgical History:  12/2010: DILATION AND CURETTAGE      Comment:  Endometrial Ablation  12/2010: ENDOMETRIAL ABLATION  2003: GASTRIC BYPASS SURGERY  2003: HERNIA REPAIR  12/2010: HYSTEROSCOPY      Comment:  with Endometrial Ablation   No date: LAPAROSCOPY  No date: LITHOTRIPSY; Bilateral  10/9/2019: OVARIAN CYST REMOVAL; Left      Comment:  Laparoscopic Left Ovarian Cystectomy performed by Yaw Shelton MD at [address] [place] [address]  No date: WISDOM TOOTH EXTRACTION      Social History    Tobacco Use      Smoking status: Never Smoker      Smokeless tobacco: Never Used       Standing Status:   Future     Standing Expiration Date:   11/18/2022     Order Specific Question:   Collection Type     Answer: Thin Prep     Order Specific Question:   Prior Abnormal Pap Test     Answer:   No     Order Specific Question:   Screening or Diagnostic     Answer:   Screening     Order Specific Question:   HPV Requested?      Answer:   Yes     Order Specific Question:   High Risk Patient     Answer:   N/A    Hemoglobin A1C     Standing Status:   Future     Standing Expiration Date:   11/4/2022    CBC Auto Differential     Standing Status:   Future     Standing Expiration Date:   5/3/2022    TSH     Standing Status:   Future     Standing Expiration Date:   11/3/2022    T4, Free     Standing Status:   Future     Standing Expiration Date:   11/3/2022    Comprehensive Metabolic Panel     Standing Status:   Future     Standing Expiration Date:   11/3/2022    Lipid Panel     Standing Status:   Future     Standing Expiration Date:   11/3/2022     Order Specific Question:   Is Patient Fasting?/# of Hours     Answer:   12    KHANH     Standing Status:   Future     Standing Expiration Date:   11/3/2022    Sedimentation Rate     Standing Status:   Future     Standing Expiration Date:   11/3/2022    Rheumatoid Factor     Standing Status:   Future     Standing Expiration Date:   11/3/2022   Vandana Williamson NP, Family Medicine, [place]     Referral Priority:   Routine     Referral Type:   Eval and Treat     Referral Reason:   Specialty Services Required     Referred to Provider:   JUS Telles CNP     Requested Specialty:   Certified Nurse Practitioner     Number of Visits Requested:   1   Education: discussed diet with calcium intake [id number] mg./day with weight bearing exercise 30-50 minutes 3-5x/week. SBE monthly and screening mammogram yearly. Ds'd low cervical position and painful intercourse, Kegal exercises. Ds'd referral to PCP and need for health maintenance labs.

## 2021-11-04 ENCOUNTER — HOSPITAL ENCOUNTER (OUTPATIENT)
Dept: LAB | Age: 51
Discharge: HOME | End: 2021-11-04
Payer: COMMERCIAL

## 2021-11-04 DIAGNOSIS — Z00.00 HEALTHCARE MAINTENANCE: ICD-10-CM

## 2021-11-04 DIAGNOSIS — M25.542 ARTHRALGIA OF BOTH HANDS: ICD-10-CM

## 2021-11-04 DIAGNOSIS — M25.541 ARTHRALGIA OF BOTH HANDS: ICD-10-CM

## 2021-11-04 LAB
ABSOLUTE EOS #: 0.07 K/UL (ref 0–0.44)
ABSOLUTE IMMATURE GRANULOCYTE: <0.03 K/UL (ref 0–0.3)
ABSOLUTE LYMPH #: 1.29 K/UL (ref 1.1–3.7)
ABSOLUTE MONO #: 0.51 K/UL (ref 0.1–1.2)
ALBUMIN SERPL-MCNC: 4.6 G/DL (ref 3.5–5.2)
ALBUMIN/GLOBULIN RATIO: 1.6 (ref 1–2.5)
ALP BLD-CCNC: 117 U/L (ref 35–104)
ALT SERPL-CCNC: 9 U/L (ref 5–33)
ANION GAP SERPL CALCULATED.3IONS-SCNC: 11 MMOL/L (ref 9–17)
AST SERPL-CCNC: 14 U/L
BASOPHILS # BLD: 1 % (ref 0–2)
BASOPHILS ABSOLUTE: 0.04 K/UL (ref 0–0.2)
BILIRUB SERPL-MCNC: 0.96 MG/DL (ref 0.3–1.2)
BUN BLDV-MCNC: 11 MG/DL (ref 6–20)
BUN/CREAT BLD: 22 (ref 9–20)
CALCIUM SERPL-MCNC: 9.8 MG/DL (ref 8.6–10.4)
CHLORIDE BLD-SCNC: 102 MMOL/L (ref 98–107)
CHOLESTEROL/HDL RATIO: 2.9
CHOLESTEROL: 191 MG/DL
CO2: 26 MMOL/L (ref 20–31)
CREAT SERPL-MCNC: 0.5 MG/DL (ref 0.5–0.9)
DIFFERENTIAL TYPE: ABNORMAL
EOSINOPHILS RELATIVE PERCENT: 1 % (ref 1–4)
ESTIMATED AVERAGE GLUCOSE: 108 MG/DL
GFR AFRICAN AMERICAN: >60 ML/MIN
GFR NON-AFRICAN AMERICAN: >60 ML/MIN
GFR SERPL CREATININE-BSD FRML MDRD: ABNORMAL ML/MIN/{1.73_M2}
GFR SERPL CREATININE-BSD FRML MDRD: ABNORMAL ML/MIN/{1.73_M2}
GLUCOSE BLD-MCNC: 95 MG/DL (ref 70–99)
HBA1C MFR BLD: 5.4 % (ref 4–6)
HCT VFR BLD CALC: 36.7 % (ref 36.3–47.1)
HDLC SERPL-MCNC: 66 MG/DL
HEMOGLOBIN: 11.3 G/DL (ref 11.9–15.1)
IMMATURE GRANULOCYTES: 0 %
LDL CHOLESTEROL: 107 MG/DL (ref 0–130)
LYMPHOCYTES # BLD: 23 % (ref 24–43)
MCH RBC QN AUTO: 24.6 PG (ref 25.2–33.5)
MCHC RBC AUTO-ENTMCNC: 30.8 G/DL (ref 25.2–33.5)
MCV RBC AUTO: 79.8 FL (ref 82.6–102.9)
MONOCYTES # BLD: 9 % (ref 3–12)
NRBC AUTOMATED: 0 PER 100 WBC
PDW BLD-RTO: 15 % (ref 11.8–14.4)
PLATELET # BLD: 336 K/UL (ref 138–453)
PLATELET ESTIMATE: ABNORMAL
PMV BLD AUTO: 10.2 FL (ref 8.1–13.5)
POTASSIUM SERPL-SCNC: 4.7 MMOL/L (ref 3.7–5.3)
RBC # BLD: 4.6 M/UL (ref 3.95–5.11)
RBC # BLD: ABNORMAL 10*6/UL
RHEUMATOID FACTOR: <10 IU/ML
SEDIMENTATION RATE, ERYTHROCYTE: 11 MM (ref 0–30)
SEG NEUTROPHILS: 66 % (ref 36–65)
SEGMENTED NEUTROPHILS ABSOLUTE COUNT: 3.63 K/UL (ref 1.5–8.1)
SODIUM BLD-SCNC: 139 MMOL/L (ref 135–144)
THYROXINE, FREE: 1.14 NG/DL (ref 0.93–1.7)
TOTAL PROTEIN: 7.5 G/DL (ref 6.4–8.3)
TRIGL SERPL-MCNC: 89 MG/DL
TSH SERPL DL<=0.05 MIU/L-ACNC: 1.53 MIU/L (ref 0.3–5)
VLDLC SERPL CALC-MCNC: NORMAL MG/DL (ref 1–30)
WBC # BLD: 5.6 K/UL (ref 3.5–11.3)
WBC # BLD: ABNORMAL 10*3/UL

## 2021-11-04 PROCEDURE — 84443 ASSAY THYROID STIM HORMONE: CPT

## 2021-11-04 PROCEDURE — 86038 ANTINUCLEAR ANTIBODIES: CPT

## 2021-11-04 PROCEDURE — 85651 RBC SED RATE NONAUTOMATED: CPT

## 2021-11-04 PROCEDURE — 80061 LIPID PANEL: CPT

## 2021-11-04 PROCEDURE — 86225 DNA ANTIBODY NATIVE: CPT

## 2021-11-04 PROCEDURE — 85025 COMPLETE CBC W/AUTO DIFF WBC: CPT

## 2021-11-04 PROCEDURE — 36415 COLL VENOUS BLD VENIPUNCTURE: CPT

## 2021-11-04 PROCEDURE — 86431 RHEUMATOID FACTOR QUANT: CPT

## 2021-11-04 PROCEDURE — 80053 COMPREHEN METABOLIC PANEL: CPT

## 2021-11-04 PROCEDURE — 84439 ASSAY OF FREE THYROXINE: CPT

## 2021-11-04 PROCEDURE — 83036 HEMOGLOBIN GLYCOSYLATED A1C: CPT

## 2021-11-04 NOTE — RESULT ENCOUNTER NOTE
Patient now in the CT holding room for recovery s/p CT scan and myelogram procedure. Vitals stable . Patient anxious encouraged to lay still and avoid raising his head above 30 degrees. Oral fluids offered. 8768 Boxed lunch meal offered. Please notify patient lab results WNL.   Glucose better!! DA/ANTONIETAM

## 2021-11-05 LAB
HPV SAMPLE: NORMAL
HPV, GENOTYPE 16: NOT DETECTED
HPV, GENOTYPE 18: NOT DETECTED
HPV, HIGH RISK OTHER: NOT DETECTED
HPV, INTERPRETATION: NORMAL
SPECIMEN DESCRIPTION: NORMAL

## 2021-11-08 LAB
ANTI DNA DOUBLE STRANDED: 0.7 IU/ML
ANTI-NUCLEAR ANTIBODY (ANA): NEGATIVE
ENA ANTIBODIES SCREEN: 0.3 U/ML

## 2021-11-10 LAB — CYTOLOGY REPORT: NORMAL

## 2021-11-17 ENCOUNTER — OFFICE VISIT (OUTPATIENT)
Dept: FAMILY MEDICINE CLINIC | Age: 51
End: 2021-11-17
Payer: COMMERCIAL

## 2021-11-17 VITALS
WEIGHT: 200 LBS | SYSTOLIC BLOOD PRESSURE: 124 MMHG | OXYGEN SATURATION: 99 % | TEMPERATURE: 98.2 F | HEIGHT: 64 IN | HEART RATE: 79 BPM | RESPIRATION RATE: 16 BRPM | DIASTOLIC BLOOD PRESSURE: 84 MMHG | BODY MASS INDEX: 34.15 KG/M2

## 2021-11-17 DIAGNOSIS — D64.9 ANEMIA, UNSPECIFIED TYPE: ICD-10-CM

## 2021-11-17 DIAGNOSIS — Z12.11 COLON CANCER SCREENING: ICD-10-CM

## 2021-11-17 DIAGNOSIS — M19.042 PRIMARY OSTEOARTHRITIS OF BOTH HANDS: Primary | ICD-10-CM

## 2021-11-17 DIAGNOSIS — M19.041 PRIMARY OSTEOARTHRITIS OF BOTH HANDS: Primary | ICD-10-CM

## 2021-11-17 PROCEDURE — 99203 OFFICE O/P NEW LOW 30 MIN: CPT | Performed by: NURSE PRACTITIONER

## 2021-11-17 ASSESSMENT — PATIENT HEALTH QUESTIONNAIRE - PHQ9
2. FEELING DOWN, DEPRESSED OR HOPELESS: 0
SUM OF ALL RESPONSES TO PHQ QUESTIONS 1-9: 0
1. LITTLE INTEREST OR PLEASURE IN DOING THINGS: 0
SUM OF ALL RESPONSES TO PHQ9 QUESTIONS 1 & 2: 0
SUM OF ALL RESPONSES TO PHQ QUESTIONS 1-9: 0
SUM OF ALL RESPONSES TO PHQ QUESTIONS 1-9: 0

## 2021-11-17 NOTE — PROGRESS NOTES
Subjective:      Patient ID: Rashaun Berger is a 46 y.o. female coming in for   Chief Complaint   Patient presents with    New Patient     new to establish    Arthritis     c/o arthritis like pain in bilateral hands. sometimes hurt in the middle of the night, not a constant pain. states that she does have arthritis in her right pinky finger. HPI  Presents to office for new pt appt. Reports having bilateral hand pain intermittently. Right hand worse than left. Does have diagnosis of arthritis of right dip joint of pinky finger found on an xray. 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 Pt does not take anything for the pain. Denies any joint redness, swelling. Stiffness occurs at any time of the day or night. Review of Systems   Constitutional: Negative for fatigue and unexpected weight change. Respiratory: Negative for shortness of breath. Cardiovascular: Negative for chest pain and leg swelling. Gastrointestinal: Negative for abdominal pain, blood in stool, constipation and diarrhea. Last colonoscopy 2011   Musculoskeletal: Positive for arthralgias. Skin: Negative for rash. Neurological: Negative for dizziness, light-headedness and headaches. Hematological:        Does have a history of anemia. Recent Results (from the past 504 hour(s))   Human papillomavirus (HPV) DNA probe thin prep high risk    Collection Time: 11/03/21 10:09 AM   Result Value Ref Range    Specimen Description . CERVIX     HPV Sample . THIN PREP     HPV, Genotype 16 Not Detected Not Detected    HPV, Genotype 18 Not Detected Not Detected    HPV, High Risk Other Not Detected Not Detected    HPV, Interpretation         GYN Cytology    Collection Time: 11/03/21 10:25 AM   Result Value Ref Range    Cytology Report       INTERPRETATION    Cervical material, (ThinPrep vial, Imaging-assisted review):  Specimen Adequacy:       Satisfactory for evaluation.        - Endocervical/transformation zone component present. Descriptive Diagnosis:       Negative for intraepithelial lesion or malignancy. Reactive cellular changes associated with inflammation. Comments:       High Risk HPV testing was ordered. Cytotechnologist:   Aurelio Hernandez M.D.  **Electronically Signed Out**         [facility]/11/10/2021          Procedure/Addendum  HPV Procedure Report     Date Ordered:     11/4/2021     Status:  Signed Out       Date Complete:     11/4/2021     By: Cristopher Pate, CT(ASCP)       Date Reported:     11/10/2021       INTERPRETATION  Roche HPV DNA High Risk                                  HPV Sample               Thin Prep                    (Ref Range)  HPV Type 16               Not Detected                    (Not  Detected)  HPV Type 18               Not Detected                    (Not  Detected)  Other High Ri sk HPV          Not Detected                    (Not  Detected)       This test amplifies and detects DNA of 14 high-risk HPV types  associated with cervical cancer and its precursor lesions (HPV types  16, 18, 31, 33, 35, 39, 45, 51, 52, 56, 58, 59, 66, and 68). Sensitivity may be affected by specimen collection methods, stage of  infection, and the presence of interfering substances. Results should  be interpreted in conjunction with other available laboratory and  clinical data. A negative high-risk HPV result does not exclude the  possibility of future cytologic HSIL or underlying CIN2-3 or cancer. This test is intended for medical purposes only and is not valid for  the evaluation of suspected sexual abuse or for other forensic  purposes. Source:  1: Cervical material, (ThinPrep vial, Imaging-assisted review)    Clinical History  Endometrial ablation: 12/2010  Z12.4 Encounter for screening for malignant neoplasm of cervix  Co-Test:  ThinPrep Pap with high risk  HPV testing  Patient Address: [address] [address] [address]  LMP: 10/27/2021  GYNECOLOGIC CYTOLOGY REPORT    Patient Name: Phani Hayes [facility] Rec: [id number]  Path Number: [id number]  [employer]  [facility]  ANATOMIC PATHOLOGY  [address] [place] [address].   [place], [address]  [phone]  Fax: [phone]     Rheumatoid Factor    Collection Time: 11/04/21  7:44 AM   Result Value Ref Range    Rheumatoid Factor <10 <14 IU/mL   Sedimentation Rate    Collection Time: 11/04/21  7:44 AM   Result Value Ref Range    Sed Rate 11 0 - 30 mm   KHANH    Collection Time: 11/04/21  7:44 AM   Result Value Ref Range    KHANH NEGATIVE NEGATIVE    ROCK Antibodies Screen 0.3 <0.7 U/mL    Anti ds DNA 0.7 <10.0 IU/mL   Lipid Panel    Collection Time: 11/04/21  7:44 AM   Result Value Ref Range    Cholesterol 191 <200 mg/dL    HDL 66 >40 mg/dL    LDL Cholesterol 107 0 - 130 mg/dL    Chol/HDL Ratio 2.9 <5    Triglycerides 89 <150 mg/dL    VLDL NOT REPORTED 1 - 30 mg/dL   Comprehensive Metabolic Panel    Collection Time: 11/04/21  7:44 AM   Result Value Ref Range    Glucose 95 70 - 99 mg/dL    BUN 11 6 - 20 mg/dL    CREATININE 0.50 0.50 - 0.90 mg/dL    Bun/Cre Ratio 22 (H) 9 - 20    Calcium 9.8 8.6 - 10.4 mg/dL    Sodium 139 135 - 144 mmol/L    Potassium 4.7 3.7 - 5.3 mmol/L    Chloride 102 98 - 107 mmol/L    CO2 26 20 - 31 mmol/L    Anion Gap 11 9 - 17 mmol/L    Alkaline Phosphatase 117 (H) 35 - 104 U/L    ALT 9 5 - 33 U/L    AST 14 <32 U/L    Total Bilirubin 0.96 0.3 - 1.2 mg/dL    Total Protein 7.5 6.4 - 8.3 g/dL    Albumin 4.6 3.5 - 5.2 g/dL    Albumin/Globulin Ratio 1.6 1.0 - 2.5    GFR Non-African American >60 >60 mL/min    GFR African American >60 >60 mL/min    GFR Comment          GFR Staging NOT REPORTED    T4, Free    Collection Time: 11/04/21  7:44 AM   Result Value Ref Range    Thyroxine, Free 1.14 0.93 - 1.70 ng/dL   TSH    Collection Time: 11/04/21  7:44 AM   Result Value Ref Range    TSH 1.53 0.30 - 5.00 mIU/L   CBC Auto Differential    Collection Time: 11/04/21  7:44 AM   Result Value Ref Range    WBC 5.6 3.5 - 11.3 k/uL    RBC 4.60 3.95 - 5.11 m/uL    Hemoglobin 11.3 (L) 11.9 - 15.1 g/dL    Hematocrit 36.7 36.3 - 47.1 %    MCV 79.8 (L) 82.6 - 102.9 fL    MCH 24.6 (L) 25.2 - 33.5 pg    MCHC 30.8 25.2 - 33.5 g/dL    RDW 15.0 (H) 11.8 - 14.4 %    Platelets 317 437 - 370 k/uL    MPV 10.2 8.1 - 13.5 fL    NRBC Automated 0.0 0.0 per 100 WBC    Differential Type NOT REPORTED     Seg Neutrophils 66 (H) 36 - 65 %    Lymphocytes 23 (L) 24 - 43 %    Monocytes 9 3 - 12 %    Eosinophils % 1 1 - 4 %    Basophils 1 0 - 2 %    Immature Granulocytes 0 0 %    Segs Absolute 3.63 1.50 - 8.10 k/uL    Absolute Lymph # 1.29 1.10 - 3.70 k/uL    Absolute Mono # 0.51 0.10 - 1.20 k/uL    Absolute Eos # 0.07 0.00 - 0.44 k/uL    Basophils Absolute 0.04 0.00 - 0.20 k/uL    Absolute Immature Granulocyte <0.03 0.00 - 0.30 k/uL    WBC Morphology NOT REPORTED     RBC Morphology ANISOCYTOSIS PRESENT     Platelet Estimate NOT REPORTED    Hemoglobin A1C    Collection Time: 11/04/21  7:45 AM   Result Value Ref Range    Hemoglobin A1C 5.4 4.0 - 6.0 %    Estimated Avg Glucose 108 mg/dL       Objective:/84   Pulse 79   Temp 98.2 °F (36.8 °C)   Resp 16   Ht 5' 4\" (1.626 m)   Wt 200 lb (90.7 kg)   LMP 10/27/2021 ([facility] Date)   SpO2 99%   BMI 34.33 kg/m²      Physical Exam  Vitals and nursing note reviewed. Constitutional:       General: She is not in acute distress. Appearance: Normal appearance. She is not ill-appearing. HENT:      Head: Normocephalic. Cardiovascular:      Rate and Rhythm: Normal rate and regular rhythm. Heart sounds: Normal heart sounds. Pulmonary:      Effort: Pulmonary effort is normal.      Breath sounds: Normal breath sounds. Musculoskeletal:      Right hand: Deformity (DIP joint of right pinky unable to fully extend) present. Right lower leg: No edema. Left lower leg: No edema. Skin:     General: Skin is warm and dry.       Findings: No rash.   Neurological:      General: No focal deficit present. Mental Status: She is alert and oriented to person, place, and time. Assessment:      1. Primary osteoarthritis of both hands    2. Anemia, unspecified type    3. Colon cancer screening           Plan: Will try voltaren gel to hands for arthritic pain. Encouraged pt to take her daily iron supplement to assist with her mild anemia. Follow up in one year or as needed. Orders Placed This Encounter   Procedures    Cologuard (For External Results Only)     This test is performed by an external laboratory and is used for result attachment only. It is required that this order requisition be faxed to: [facility] @ [phone]. See [email or web address] for further information. Standing Status:   Future     Standing Expiration Date:   11/17/2022      Outpatient Encounter Medications as of 11/17/2021   Medication Sig Dispense Refill    diclofenac sodium (VOLTAREN) 1 % GEL Apply 2 g topically 4 times daily 50 g 1    [DISCONTINUED] methylPREDNISolone (MEDROL DOSEPACK) 4 MG tablet Take by mouth. (Patient not taking: Reported on 11/3/2021) 1 kit 0    [DISCONTINUED] ibuprofen (ADVIL;MOTRIN) 800 MG tablet Take 800 mg by mouth (Patient not taking: Reported on 11/3/2021)      [DISCONTINUED] magnesium oxide (MAG-OX) 400 MG tablet Take 400 mg by mouth daily (Patient not taking: Reported on 11/3/2021)      [DISCONTINUED] calcium carbonate 600 MG TABS tablet Take 1 tablet by mouth daily (Patient not taking: Reported on 11/3/2021)      [DISCONTINUED] Multiple Vitamins-Minerals (MULTIVITAMIN GUMMIES ADULT PO) Take by mouth (Patient not taking: Reported on 11/3/2021)      [DISCONTINUED] Multiple Vitamins-Minerals (MULTIVITAMIN ADULT PO) Take 1 capsule by mouth (Patient not taking: Reported on 11/3/2021)       No facility-administered encounter medications on file as of 11/17/2021.             JUS Lozoya - CNP

## 2021-11-18 ASSESSMENT — ENCOUNTER SYMPTOMS
BLOOD IN STOOL: 0
DIARRHEA: 0
ABDOMINAL PAIN: 0
SHORTNESS OF BREATH: 0
CONSTIPATION: 0

## 2021-12-01 ENCOUNTER — HOSPITAL ENCOUNTER (EMERGENCY)
Age: 51
Discharge: HOME | End: 2021-12-01
Attending: EMERGENCY MEDICINE
Payer: COMMERCIAL

## 2021-12-01 VITALS
HEIGHT: 65 IN | WEIGHT: 200 LBS | DIASTOLIC BLOOD PRESSURE: 84 MMHG | OXYGEN SATURATION: 97 % | RESPIRATION RATE: 16 BRPM | SYSTOLIC BLOOD PRESSURE: 129 MMHG | TEMPERATURE: 98.9 F | BODY MASS INDEX: 33.32 KG/M2 | HEART RATE: 86 BPM

## 2021-12-01 DIAGNOSIS — J06.9 ACUTE UPPER RESPIRATORY INFECTION: ICD-10-CM

## 2021-12-01 DIAGNOSIS — H66.012 NON-RECURRENT ACUTE SUPPURATIVE OTITIS MEDIA OF LEFT EAR WITH SPONTANEOUS RUPTURE OF TYMPANIC MEMBRANE: Primary | ICD-10-CM

## 2021-12-01 PROCEDURE — 99283 EMERGENCY DEPT VISIT LOW MDM: CPT

## 2021-12-01 PROCEDURE — 6370000000 HC RX 637 (ALT 250 FOR IP): Performed by: EMERGENCY MEDICINE

## 2021-12-01 RX ORDER — ONDANSETRON 4 MG/1
4 TABLET, ORALLY DISINTEGRATING ORAL EVERY 8 HOURS PRN
Qty: 12 TABLET | Refills: 0 | Status: SHIPPED | OUTPATIENT
Start: 2021-12-01 | End: 2021-12-10

## 2021-12-01 RX ORDER — AMOXICILLIN AND CLAVULANATE POTASSIUM 875; 125 MG/1; MG/1
1 TABLET, FILM COATED ORAL ONCE
Status: COMPLETED | OUTPATIENT
Start: 2021-12-01 | End: 2021-12-01

## 2021-12-01 RX ORDER — AMOXICILLIN AND CLAVULANATE POTASSIUM 875; 125 MG/1; MG/1
1 TABLET, FILM COATED ORAL 2 TIMES DAILY
Qty: 20 TABLET | Refills: 0 | Status: SHIPPED | OUTPATIENT
Start: 2021-12-01 | End: 2021-12-11

## 2021-12-01 RX ADMIN — AMOXICILLIN AND CLAVULANATE POTASSIUM 1 TABLET: 875; 125 TABLET, FILM COATED ORAL at 22:36

## 2021-12-02 ENCOUNTER — TELEPHONE (OUTPATIENT)
Dept: OTOLARYNGOLOGY | Age: 51
End: 2021-12-02

## 2021-12-02 ASSESSMENT — ENCOUNTER SYMPTOMS
COUGH: 1
SHORTNESS OF BREATH: 0
RHINORRHEA: 1
VOMITING: 0
SORE THROAT: 1
NAUSEA: 1

## 2021-12-02 NOTE — TELEPHONE ENCOUNTER
Patient was seen at [address] [place] [address] on 12/1/2021. Patient was diagnosed with a spontaneous rupture of the tympanic membrane. Patient called the office this morning to schedule a follow up, writer attempted to call nurse but no . Please call patient back at [phone].

## 2021-12-02 NOTE — ED PROVIDER NOTES
[facility] ED  [address]  [place] [address]  Phone: [phone]  eMERGENCY dEPARTMENT eNCOUnter      Pt Name: Yosef Polanco  MRN: [id number]  [place] 1970  Date of evaluation: 12/2/21      CHIEF COMPLAINT     Chief Complaint   Patient presents with    Otalgia     left       [address] [address] Rosangela Solorio is a 46 y.o. female who presents today with left-sided ear pain and bleeding. The patient indicates that she has had upper respiratory symptoms including a runny nose and scratchy throat and cough over the last few days but has not had a fever. No chest pain or difficulty breathing. She had not been having ear pain until earlier when she felt a sudden intense pain and popping sensation in the left ear with blood dripping from it after that. Does not take blood thinners. No recent trauma. No associated headache. Is here for evaluation. REVIEW OF SYSTEMS     Review of Systems   Constitutional: Negative for fever. HENT: Positive for congestion, ear discharge, ear pain, rhinorrhea and sore throat. Respiratory: Positive for cough. Negative for shortness of breath. Cardiovascular: Negative for chest pain. Gastrointestinal: Positive for nausea. Negative for vomiting. Skin: Negative for rash. Neurological: Negative for headaches. All other systems reviewed and are negative. PAST MEDICAL HISTORY    has a past medical history of Anemia, Anemia, Chronic kidney disease, Endometriosis, Headache, History of blood transfusion, Hyperlipidemia, and Obesity. SURGICAL HISTORY      has a past surgical history that includes Gastric bypass surgery (2003); hernia repair (2003); Lithotripsy (Bilateral); Dilation & curettage (12/2010); Endometrial ablation (12/2010); hysteroscopy (12/2010); [place] tooth extraction; ovarian cyst removal (Left, 10/9/2019); and laparoscopy.     CURRENT MEDICATIONS       Discharge Medication List as of 12/1/2021 10:35 PM CONTINUE these medications which have NOT CHANGED    Details   diclofenac sodium (VOLTAREN) 1 % GEL Apply 2 g topically 4 times daily, Topical, 4 TIMES DAILY Starting Wed 11/17/2021, Disp-50 g, R-1, Normal             ALLERGIES     has No Known Allergies. FAMILY HISTORY     She indicated that the status of her mother is unknown. She indicated that the status of her maternal uncle is unknown. She indicated that the status of her paternal aunt is unknown.     family history includes Cancer in her maternal uncle and paternal aunt; Other in her mother. SOCIAL HISTORY      reports that she has never smoked. She has never used smokeless tobacco. She reports that she does not drink alcohol and does not use drugs. PHYSICAL EXAM     INITIAL VITALS:  height is 5' 5\" (1.651 m) and weight is 200 lb (90.7 kg). Her tympanic temperature is 98.9 °F (37.2 °C). Her blood pressure is 129/84 and her pulse is 86. Her respiration is 16 and oxygen saturation is 97%. Physical Exam  Vitals reviewed. Constitutional:       General: She is not in acute distress. Appearance: Normal appearance. She is ill-appearing. She is not toxic-appearing or diaphoretic. HENT:      Head: Normocephalic and atraumatic. Right Ear: Tympanic membrane normal.      Ears:      Comments: Left tympanic membrane is erythematous and bulging with some blood in the canal suspicious for acute perforation in the setting of acute otitis media. Nose: Congestion and rhinorrhea present. Mouth/Throat:      Mouth: Mucous membranes are moist.      Pharynx: No oropharyngeal exudate or posterior oropharyngeal erythema. Eyes:      Extraocular Movements: Extraocular movements intact. Pupils: Pupils are equal, round, and reactive to light. Neck:      Comments: Bilateral but left greater than right anterior cervical adenopathy. Cardiovascular:      Rate and Rhythm: Normal rate and regular rhythm. Pulses: Normal pulses.       Heart sounds: Normal heart sounds. Pulmonary:      Effort: Pulmonary effort is normal. No respiratory distress. Breath sounds: Normal breath sounds. Musculoskeletal:      Cervical back: Normal range of motion. No rigidity or tenderness. Lymphadenopathy:      Cervical: Cervical adenopathy present. Skin:     General: Skin is warm and dry. Capillary Refill: Capillary refill takes less than 2 seconds. Findings: No rash. Neurological:      General: No focal deficit present. Mental Status: She is alert and oriented to person, place, and time. Cranial Nerves: No cranial nerve deficit. Motor: No weakness. Psychiatric:         Mood and Affect: Mood normal.         Behavior: Behavior normal.       DIFFERENTIAL DIAGNOSIS / MDM / EMERGENCY DEPARTMENT COURSE:     Clinical impression is that the patient has a URI with subsequent left-sided otitis media and perforation. I will place her on Augmentin. There are no features in the history or physical to suggest that further work-up or that there is a more acute malignant process going on such as meningitis mastoiditis [place]'s angina or similar. Patient does not appear to be septic. Patient is agreeable to the plan I have offered her Zofran for her nausea. She was carefully educated on the warning signs should return to the emergency department as well as the need for follow-up with her PCP and/or ENT. I also discussed with her water precautions and that she must avoid getting water in the ear including submersion and also explained how to place a cotton ball or gauze in petroleum jelly and patient relates that in the external ear when showering. Patient expresses understanding had no further questions or concerns, and understands the need for close follow-up with her PCP and/or ENT. I have reviewed the disposition diagnosis with the patient and or their family/guardian. I have answered their questions and givendischarge instructions. They voiced understanding of these instructions and did not have any further questions or complaints. DIAGNOSTIC RESULTS     EKG: All EKG's are interpreted by the Emergency Department Physician who either signs or Co-signs this chart inthe absence of a cardiologist.        RADIOLOGY:   Radiologist interpretation of radiologic studies:     No results found. LABS:  No results found for this visit on 12/01/21. EMERGENCY DEPARTMENT COURSE:   Vitals:    Vitals:    12/01/21 2159 12/01/21 2238   BP: 135/81 129/84   Pulse: 97 86   Resp: 18 16   Temp: 98.9 °F (37.2 °C)    TempSrc: Tympanic    SpO2: 97% 97%   Weight: 200 lb (90.7 kg)    Height: 5' 5\" (1.651 m)      -------------------------  BP: 129/84, Temp: 98.9 °F (37.2 °C), Pulse: 86, Resp: 16    CONSULTS:  None    PROCEDURES:  None    FINAL IMPRESSION      1. Non-recurrent acute suppurative otitis media of left ear with spontaneous rupture of tympanic membrane    2. Acute upper respiratory infection          DISPOSITION/PLAN   DISPOSITION Decision To Discharge 12/01/2021 10:32:39 PM      PATIENT REFERRED TO:  JUS Lord [facility]  [phone]    In 2 days        DISCHARGE MEDICATIONS:  Discharge Medication List as of 12/1/2021 10:35 PM      START taking these medications    Details   amoxicillin-clavulanate (AUGMENTIN) 875-125 MG per tablet Take 1 tablet by mouth 2 times daily for 10 days, Disp-20 tablet, R-0Normal      ondansetron (ZOFRAN ODT) 4 MG disintegrating tablet Take 1 tablet by mouth every 8 hours as needed for Nausea, Disp-12 tablet, R-0Normal             There are no active hospital problems to display for this patient.       (Please note that portions of this note were completed with avoice recognition program.  Efforts were made to edit the dictations but occasionally words are mis-transcribed.)    Jocelyn Maxwell MD, [address] [employer] [facility],[address]  Attending Emergency Medicine Physician        Jocelyn Maxwell MD  12/02/21 [id number]

## 2021-12-10 ENCOUNTER — OFFICE VISIT (OUTPATIENT)
Dept: OTOLARYNGOLOGY | Age: 51
End: 2021-12-10
Payer: COMMERCIAL

## 2021-12-10 VITALS
BODY MASS INDEX: 33.36 KG/M2 | SYSTOLIC BLOOD PRESSURE: 118 MMHG | HEART RATE: 71 BPM | DIASTOLIC BLOOD PRESSURE: 78 MMHG | OXYGEN SATURATION: 97 % | HEIGHT: 65 IN | WEIGHT: 200.2 LBS | RESPIRATION RATE: 18 BRPM

## 2021-12-10 DIAGNOSIS — H72.92 TYMPANIC MEMBRANE PERFORATION, LEFT: Primary | ICD-10-CM

## 2021-12-10 PROCEDURE — 99204 OFFICE O/P NEW MOD 45 MIN: CPT | Performed by: OTOLARYNGOLOGY

## 2021-12-10 NOTE — PROGRESS NOTES
12/10/2021 10:10 AM DAQUAN Fitzgerald (:  1970) is a 46 y.o. female,New patient, here for evaluation of the following chief complaint(s):  Ear Problem (nw pt- ruptured TM was put on ATB on 21)      ASSESSMENT/PLAN:  1. Tympanic membrane perforation, left      1. Tympanic membrane perforation, left    Complete abx   Fu in 1 month to reassess hearing   If not back to baseline, recommend audiogram   Discussed that healing can be over several weeks     No follow-ups on file. SUBJECTIVE/OBJECTIVE:  HPI   Presented to the ER on 21 with sudden onset left ear pain. This was preceded by several days of upper respiratory-like symptoms including a runny nose, sore throat, scratchy throat, cough. She had not had a fever. The ear pain was followed by a popping sensation with drainage into the left ear canal. She was diagnosed with TM perforation and treated with augmentin. Today she has 1 day left of her abx. Her hearing on the left feels off. No history of TM perforation before.      Past Medical History:   Diagnosis Date    Anemia     Anemia     Chronic kidney disease     kidney stones    Endometriosis     Headache     History of blood transfusion     Hyperlipidemia     Obesity     Had Gastric Bypass      Past Surgical History:   Procedure Laterality Date    DILATION AND CURETTAGE  2010    Endometrial Ablation    ENDOMETRIAL ABLATION  2010    GASTRIC BYPASS SURGERY     [employer] HERNIA REPAIR      HYSTEROSCOPY  2010    with Endometrial Ablation     LAPAROSCOPY      LITHOTRIPSY Bilateral     OVARIAN CYST REMOVAL Left 10/9/2019    Laparoscopic Left Ovarian Cystectomy performed by Arabella Kincaid MD at [address] [place] [address] EXTRACTION       Social History     Tobacco History     Smoking Status  Never Smoker    Smokeless Tobacco Use  Never Used          Alcohol History     Alcohol Use Status  No          Drug Use     Drug Use Status  No          Sexual Activity     Sexually Active  Yes Partners  Male              Family History   Problem Relation Age of Onset    Cancer Maternal Uncle         Hodgkin's disease    Cancer Paternal Aunt         Met Breast cancer    Other Mother         fluid around heart      Current Outpatient Medications   Medication Instructions    amoxicillin-clavulanate (AUGMENTIN) 875-125 MG per tablet 1 tablet, Oral, 2 TIMES DAILY    diclofenac sodium (VOLTAREN) 2 g, Topical, 4 TIMES DAILY     No Known Allergies    ENT ROS: positive for - otalgia     General: The patient is found to be alert and normally responsive female. Hearing: grossly normal   Voice: Clear   Skin: The skin has normal colour and turgor. Face: The facial contour is symmetric at rest and with movement. Ears: The pinnae have normal contours. AD: EAC clear, TM intact, no effusion/erythema/retraction   AS: EAC clear, TM with healing anterior central tympanic membrane, no effusion/erythema/retraction  Tympanometry   AS: type As  AD: type A   Eye: The ocular movements are full and symmetric, the conjunctiva is unremarkable; sclera are anicteric, pupillary response is symmetric. No nystagmus is found. Nose:   The external nasal contour is normal  Oral cavity:   Anterior clear   Neck: The neck has a normal contour; no masses are found on palpation      An electronic signature was used to authenticate this note.     --Karen Persaud MD     12/10/2021 10:10 AM EST

## 2022-07-27 ENCOUNTER — OFFICE VISIT (OUTPATIENT)
Dept: OBGYN | Age: 52
End: 2022-07-27
Payer: COMMERCIAL

## 2022-07-27 ENCOUNTER — HOSPITAL ENCOUNTER (OUTPATIENT)
Dept: LAB | Age: 52
Discharge: HOME | End: 2022-07-27
Payer: COMMERCIAL

## 2022-07-27 VITALS
HEIGHT: 64 IN | BODY MASS INDEX: 34.15 KG/M2 | OXYGEN SATURATION: 99 % | HEART RATE: 82 BPM | DIASTOLIC BLOOD PRESSURE: 78 MMHG | SYSTOLIC BLOOD PRESSURE: 118 MMHG | WEIGHT: 200 LBS

## 2022-07-27 DIAGNOSIS — N92.0 MENORRHAGIA WITH REGULAR CYCLE: ICD-10-CM

## 2022-07-27 DIAGNOSIS — D64.9 ANEMIA, UNSPECIFIED TYPE: ICD-10-CM

## 2022-07-27 DIAGNOSIS — N92.0 MENORRHAGIA WITH REGULAR CYCLE: Primary | ICD-10-CM

## 2022-07-27 DIAGNOSIS — Z98.890 HISTORY OF ENDOMETRIAL ABLATION: ICD-10-CM

## 2022-07-27 LAB
ABSOLUTE EOS #: 0.1 K/UL (ref 0–0.44)
ABSOLUTE IMMATURE GRANULOCYTE: <0.03 K/UL (ref 0–0.3)
ABSOLUTE LYMPH #: 1.35 K/UL (ref 1.1–3.7)
ABSOLUTE MONO #: 0.44 K/UL (ref 0.1–1.2)
BASOPHILS # BLD: 1 % (ref 0–2)
BASOPHILS ABSOLUTE: 0.03 K/UL (ref 0–0.2)
EOSINOPHILS RELATIVE PERCENT: 2 % (ref 1–4)
FOLLICLE STIMULATING HORMONE: 12.6 MIU/ML (ref 1.7–21.5)
HCT VFR BLD CALC: 33.5 % (ref 36.3–47.1)
HEMOGLOBIN: 10.4 G/DL (ref 11.9–15.1)
IMMATURE GRANULOCYTES: 0 %
INR BLD: 1
LYMPHOCYTES # BLD: 23 % (ref 24–43)
MCH RBC QN AUTO: 23.3 PG (ref 25.2–33.5)
MCHC RBC AUTO-ENTMCNC: 31 G/DL (ref 25.2–33.5)
MCV RBC AUTO: 75.1 FL (ref 82.6–102.9)
MONOCYTES # BLD: 8 % (ref 3–12)
NRBC AUTOMATED: 0 PER 100 WBC
PARTIAL THROMBOPLASTIN TIME: 22.2 SEC (ref 23.9–33.8)
PDW BLD-RTO: 15.6 % (ref 11.8–14.4)
PLATELET # BLD: 318 K/UL (ref 138–453)
PMV BLD AUTO: 9.9 FL (ref 8.1–13.5)
PROTHROMBIN TIME: 13 SEC (ref 11.5–14.2)
RBC # BLD: 4.46 M/UL (ref 3.95–5.11)
RBC # BLD: ABNORMAL 10*6/UL
SEG NEUTROPHILS: 66 % (ref 36–65)
SEGMENTED NEUTROPHILS ABSOLUTE COUNT: 3.84 K/UL (ref 1.5–8.1)
WBC # BLD: 5.8 K/UL (ref 3.5–11.3)

## 2022-07-27 PROCEDURE — 85730 THROMBOPLASTIN TIME PARTIAL: CPT

## 2022-07-27 PROCEDURE — 85025 COMPLETE CBC W/AUTO DIFF WBC: CPT

## 2022-07-27 PROCEDURE — 36415 COLL VENOUS BLD VENIPUNCTURE: CPT

## 2022-07-27 PROCEDURE — 85610 PROTHROMBIN TIME: CPT

## 2022-07-27 PROCEDURE — 83001 ASSAY OF GONADOTROPIN (FSH): CPT

## 2022-07-27 PROCEDURE — 99213 OFFICE O/P EST LOW 20 MIN: CPT | Performed by: OBSTETRICS & GYNECOLOGY

## 2022-07-27 RX ORDER — FERROUS SULFATE 325(65) MG
325 TABLET ORAL 2 TIMES DAILY
COMMUNITY

## 2022-07-27 NOTE — PROGRESS NOTES
Hoang Patient  2022    YOB: 1970          The patient was seen today. She is here regarding follow up on her heavy menses. Pt states her clots are large and bleeds regularly every month for 3-5 days. Pt has a h/o anemia. Pt with anemia prior. Pt has had an endometrial ablation many years ago. Pt had a laparoscopy with ovarian cystectomy  which was benign . Her bowels are regular and she is voiding without difficulty. Pt wishes to have definitive surgery. Pt states her symptoms are affecting her ADL and she is always fatigued. Will schedule sono/ labs. Routes of hysterectomy d/w pt.  Risks/ benefits/ alternative options reviewed with pt    HPI:  Hoang Patient is a 46 y.o. female        OB History    Para Term  AB Living   2 2 2 0 0 2   SAB IAB Ectopic Molar Multiple Live Births   0 0 0 0 0 2      # Outcome Date GA Lbr Trey/2nd Weight Sex Delivery Anes PTL Lv   2 Term  42w0d  8 lb (3.629 kg) F Vag-Spont  N AMARILIS   1 Term  42w0d   M Vag-Spont OTHER N AMARILIS       Past Medical History:   Diagnosis Date    Anemia     Anemia     Chronic kidney disease     kidney stones    Endometriosis     Headache     History of blood transfusion     Hyperlipidemia     Obesity     Had Gastric Bypass        Past Surgical History:   Procedure Laterality Date    DILATION AND CURETTAGE  2010    Endometrial Ablation    ENDOMETRIAL ABLATION  2010    GASTRIC BYPASS SURGERY      HERNIA REPAIR      HYSTEROSCOPY  2010    with Endometrial Ablation     LAPAROSCOPY      LITHOTRIPSY Bilateral     OVARIAN CYST REMOVAL Left 10/9/2019    Laparoscopic Left Ovarian Cystectomy performed by Red Oseguera MD at [address]         Family History   Problem Relation Age of Onset    Cancer Maternal Uncle         Hodgkin's disease    Cancer Paternal Aunt         Met Breast cancer    Other Mother         fluid around heart        Social History     Socioeconomic History    Marital status:      Spouse name: Not on file    Number of children: Not on file    Years of education: Not on file    Highest education level: Not on file   Occupational History    Not on file   Tobacco Use    Smoking status: Never    Smokeless tobacco: Never   Vaping Use    Vaping Use: Never used   Substance and Sexual Activity    Alcohol use: No    Drug use: No    Sexual activity: Yes     Partners: Male   Other Topics Concern    Not on file   Social History Narrative    Not on file     Social Determinants of Health     Financial Resource Strain: Not on file   Food Insecurity: Not on file   Transportation Needs: Not on file   Physical Activity: Not on file   Stress: Not on file   Social Connections: Not on file   Intimate Partner Violence: Not on file   Housing Stability: Not on file         MEDICATIONS:  Current Outpatient Medications   Medication Sig Dispense Refill    diclofenac sodium (VOLTAREN) 1 % GEL Apply 2 g topically 4 times daily (Patient taking differently: Apply 2 g topically as needed) 50 g 1     No current facility-administered medications for this visit. ALLERGIES:  Allergies as of 07/27/2022    (No Known Allergies)         REVIEW OF SYSTEMS:       A minimum of an eleven point review of systems was completed. Review Of Systems (11 point):  Constitutional: No fever, chills or malaise;  No weight change or fatigue  Head and Eyes: No vision, Headache, Dizziness or trauma in last 12 months  ENT ROS: No hearing, Tinnitis, sinus or taste problems  Hematological and Lymphatic ROS:No Lymphoma, Von Willebrand's, Hemophillia or Bleeding History  Psych ROS: No Depression, Homicidal thoughts,suicidal thoughts, or anxiety  Breast ROS: No prior breast abnormalities or lumps  Respiratory ROS: No SOB, Pneumoniae,Cough, or Pulmonary Embolism History  Cardiovascular ROS: No Chest Pain with Exertion, Palpitations, Syncope, Edema, Arrhythmia  Gastrointestinal ROS: No Indigestion, Heartburn, Nausea, vomiting, Diarrhea, Constipation,or Bowel Changes; No Bloody Stools or melena  Genito-Urinary ROS: No Dysuria, Hematuria or Nocturia. No Urinary Incontinence or Vaginal Discharge  Musculoskeletal ROS: No Arthralgia, Arthritis,Gout,Osteoporosis or Rheumatism  Neurological ROS: No CVA, Migraines, Epilepsy, Seizure Hx, or Limb Weakness  Dermatological ROS: No Rash, Itching, Hives, Mole Changes or Cancer          Blood pressure 118/78, pulse 82, height 5' 4\" (1.626 m), weight 200 lb (90.7 kg), last menstrual period 07/25/2022, SpO2 99 %, not currently breastfeeding. Abdomen: Soft non-tender; good bowel sounds. No guarding, rebound or rigidity. No CVA tenderness bilaterally. Extremities: No calf tenderness, DTR 2/4, and No edema bilaterally    Pelvic: Exam deferred. Diagnostics:  No results found.     Lab Results:  Results for orders placed or performed during the hospital encounter of 11/04/21   Rheumatoid Factor   Result Value Ref Range    Rheumatoid Factor <10 <14 IU/mL   Sedimentation Rate   Result Value Ref Range    Sed Rate 11 0 - 30 mm   KHANH   Result Value Ref Range    KHANH NEGATIVE NEGATIVE    ROCK Antibodies Screen 0.3 <0.7 U/mL    Anti ds DNA 0.7 <10.0 IU/mL   Lipid Panel   Result Value Ref Range    Cholesterol 191 <200 mg/dL    HDL 66 >40 mg/dL    LDL Cholesterol 107 0 - 130 mg/dL    Chol/HDL Ratio 2.9 <5    Triglycerides 89 <150 mg/dL    VLDL NOT REPORTED 1 - 30 mg/dL   Comprehensive Metabolic Panel   Result Value Ref Range    Glucose 95 70 - 99 mg/dL    BUN 11 6 - 20 mg/dL    Creatinine 0.50 0.50 - 0.90 mg/dL    Bun/Cre Ratio 22 (H) 9 - 20    Calcium 9.8 8.6 - 10.4 mg/dL    Sodium 139 135 - 144 mmol/L    Potassium 4.7 3.7 - 5.3 mmol/L    Chloride 102 98 - 107 mmol/L    CO2 26 20 - 31 mmol/L    Anion Gap 11 9 - 17 mmol/L    Alkaline Phosphatase 117 (H) 35 - 104 U/L    ALT 9 5 - 33 U/L    AST 14 <32 U/L    Total Bilirubin 0.96 0.3 - 1.2 mg/dL    Total Protein 7.5 6.4 - 8.3 g/dL Albumin 4.6 3.5 - 5.2 g/dL    Albumin/Globulin Ratio 1.6 1.0 - 2.5    GFR Non-African American >60 >60 mL/min    GFR African American >60 >60 mL/min    GFR Comment          GFR Staging NOT REPORTED    T4, Free   Result Value Ref Range    Thyroxine, Free 1.14 0.93 - 1.70 ng/dL   TSH   Result Value Ref Range    TSH 1.53 0.30 - 5.00 mIU/L   CBC Auto Differential   Result Value Ref Range    WBC 5.6 3.5 - 11.3 k/uL    RBC 4.60 3.95 - 5.11 m/uL    Hemoglobin 11.3 (L) 11.9 - 15.1 g/dL    Hematocrit 36.7 36.3 - 47.1 %    MCV 79.8 (L) 82.6 - 102.9 fL    MCH 24.6 (L) 25.2 - 33.5 pg    MCHC 30.8 25.2 - 33.5 g/dL    RDW 15.0 (H) 11.8 - 14.4 %    Platelets 030 029 - 132 k/uL    MPV 10.2 8.1 - 13.5 fL    NRBC Automated 0.0 0.0 per 100 WBC    Differential Type NOT REPORTED     Seg Neutrophils 66 (H) 36 - 65 %    Lymphocytes 23 (L) 24 - 43 %    Monocytes 9 3 - 12 %    Eosinophils % 1 1 - 4 %    Basophils 1 0 - 2 %    Immature Granulocytes 0 0 %    Segs Absolute 3.63 1.50 - 8.10 k/uL    Absolute Lymph # 1.29 1.10 - 3.70 k/uL    Absolute Mono # 0.51 0.10 - 1.20 k/uL    Absolute Eos # 0.07 0.00 - 0.44 k/uL    Basophils Absolute 0.04 0.00 - 0.20 k/uL    Absolute Immature Granulocyte <0.03 0.00 - 0.30 k/uL    WBC Morphology NOT REPORTED     RBC Morphology ANISOCYTOSIS PRESENT     Platelet Estimate NOT REPORTED    Hemoglobin A1C   Result Value Ref Range    Hemoglobin A1C 5.4 4.0 - 6.0 %    Estimated Avg Glucose 108 mg/dL         Assessment:   Diagnosis Orders   1. Menorrhagia with regular cycle  CBC with Auto Differential    Protime-INR    APTT    Follicle Stimulating Hormone    US PELVIS COMPLETE NON-OB TRANSABDOMINAL AND TRANSVAGINAL      2. Anemia, unspecified type  CBC with Auto Differential    Protime-INR    APTT    Follicle Stimulating Hormone    US PELVIS COMPLETE NON-OB TRANSABDOMINAL AND TRANSVAGINAL      3.  History of endometrial ablation  CBC with Auto Differential    Protime-INR    APTT    Follicle Stimulating Hormone    US PELVIS COMPLETE NON-OB TRANSABDOMINAL AND TRANSVAGINAL        Patient Active Problem List    Diagnosis Date Noted    Cyst of left ovary     Pelvic pain in female     Anemia 11/27/2013    Other B-complex deficiencies 08/23/2013           PLAN:  Return in about 4 weeks (around 8/24/2022) for endometrial biopsy/ endosee. Pt considering definitive surgery. Routes of hysterectomy were d/w patient andd she will decide on route. Repeat Annual every 1 year  Cervical Cytology Evaluation begins at 24years old. If Negative Cytology, Follow-up screening per current guidelines. Return to the office in 20 weeks. Counseled on preventative health maintenance follow-up. Orders Placed This Encounter   Procedures    US PELVIS COMPLETE NON-OB TRANSABDOMINAL AND TRANSVAGINAL     Standing Status:   Future     Standing Expiration Date:   7/27/2023    CBC with Auto Differential     Standing Status:   Future     Standing Expiration Date:   7/27/2023    Protime-INR     Standing Status:   Future     Standing Expiration Date:   7/27/2023     Order Specific Question:   Daily Coumadin Dose? Answer:   N    APTT     Standing Status:   Future     Standing Expiration Date:   7/27/2023     Order Specific Question:   Daily Heparin Dose? Answer:   N    Follicle Stimulating Hormone     Standing Status:   Future     Standing Expiration Date:   7/27/2023           The patient, Hoang Patient is a 46 y.o. female, was seen with a total time spent of 20 minutes for the visit on this date of service by the E/M provider. The time component had both face to face and non face to face time spent in determining the total time component. Counseling and education regarding her diagnosis listed below and her options regarding those diagnoses were also included in determining her time component. Diagnosis Orders   1.  Menorrhagia with regular cycle  CBC with Auto Differential    Protime-INR    APTT    Follicle Stimulating Hormone    US PELVIS COMPLETE NON-OB TRANSABDOMINAL AND TRANSVAGINAL      2. Anemia, unspecified type  CBC with Auto Differential    Protime-INR    APTT    Follicle Stimulating Hormone    US PELVIS COMPLETE NON-OB TRANSABDOMINAL AND TRANSVAGINAL      3. History of endometrial ablation  CBC with Auto Differential    Protime-INR    APTT    Follicle Stimulating Hormone    US PELVIS COMPLETE NON-OB TRANSABDOMINAL AND TRANSVAGINAL           The patient had her preventative health maintenance recommendations and follow-up reviewed with her at the completion of her visit.

## 2022-07-28 DIAGNOSIS — Z01.812 PRE-PROCEDURE LAB EXAM: Primary | ICD-10-CM

## 2022-07-29 ENCOUNTER — HOSPITAL ENCOUNTER (OUTPATIENT)
Dept: ULTRASOUND IMAGING | Age: 52
Discharge: HOME | End: 2022-07-31
Payer: COMMERCIAL

## 2022-07-29 DIAGNOSIS — Z98.890 HISTORY OF ENDOMETRIAL ABLATION: ICD-10-CM

## 2022-07-29 DIAGNOSIS — D64.9 ANEMIA, UNSPECIFIED TYPE: ICD-10-CM

## 2022-07-29 DIAGNOSIS — N92.0 MENORRHAGIA WITH REGULAR CYCLE: ICD-10-CM

## 2022-07-29 PROCEDURE — 76856 US EXAM PELVIC COMPLETE: CPT

## 2022-08-03 ENCOUNTER — PROCEDURE VISIT (OUTPATIENT)
Dept: OBGYN | Age: 52
End: 2022-08-03
Payer: COMMERCIAL

## 2022-08-03 ENCOUNTER — HOSPITAL ENCOUNTER (OUTPATIENT)
Age: 52
Setting detail: SPECIMEN
Discharge: HOME | End: 2022-08-03
Payer: COMMERCIAL

## 2022-08-03 ENCOUNTER — HOSPITAL ENCOUNTER (OUTPATIENT)
Dept: LAB | Age: 52
Discharge: HOME | End: 2022-08-03
Payer: COMMERCIAL

## 2022-08-03 VITALS — OXYGEN SATURATION: 99 % | HEART RATE: 75 BPM | SYSTOLIC BLOOD PRESSURE: 122 MMHG | DIASTOLIC BLOOD PRESSURE: 80 MMHG

## 2022-08-03 DIAGNOSIS — N92.0 MENORRHAGIA WITH REGULAR CYCLE: ICD-10-CM

## 2022-08-03 DIAGNOSIS — D64.9 ANEMIA, UNSPECIFIED TYPE: ICD-10-CM

## 2022-08-03 DIAGNOSIS — D64.9 ANEMIA, UNSPECIFIED TYPE: Primary | ICD-10-CM

## 2022-08-03 DIAGNOSIS — N84.1 CERVICAL POLYP: ICD-10-CM

## 2022-08-03 DIAGNOSIS — Z98.890 HISTORY OF ENDOMETRIAL ABLATION: ICD-10-CM

## 2022-08-03 DIAGNOSIS — Z01.812 PRE-PROCEDURE LAB EXAM: ICD-10-CM

## 2022-08-03 LAB — HCG(URINE) PREGNANCY TEST: NEGATIVE

## 2022-08-03 PROCEDURE — 88305 TISSUE EXAM BY PATHOLOGIST: CPT

## 2022-08-03 PROCEDURE — 58558 HYSTEROSCOPY BIOPSY: CPT | Performed by: OBSTETRICS & GYNECOLOGY

## 2022-08-03 PROCEDURE — 81025 URINE PREGNANCY TEST: CPT

## 2022-08-03 NOTE — PROGRESS NOTES
[facility] [place]  Hysteroscopy Procedure Note      Elyse Gregorio  8/3/2022  Patient's last menstrual period was 07/25/2022 (approximate). Chief Complaint   Patient presents with    Procedure     Endo bx         Blood pressure 122/80, pulse 75, last menstrual period 07/25/2022, SpO2 99 %, not currently breastfeeding. UltraSound Findings:   US PELVIS COMPLETE NON-OB TRANSABDOMINAL AND TRANSVAGINAL    Result Date: 8/1/2022  EXAMINATION: TRANSABDOMINAL AND TRANSVAGINAL PELVIC ULTRASOUND 7/29/2022 TECHNIQUE: Transabdominal and transvaginal pelvic ultrasound was performed. COMPARISON: 12/31/2019 10/03/2019 HISTORY: ORDERING SYSTEM PROVIDED HISTORY: Menorrhagia with regular cycle TECHNOLOGIST PROVIDED HISTORY: Reason for Exam: Menorrhagia with regular cycle, Anemia, unspecified type, History of endometrial ablation FINDINGS: Measurements: Uterus: 11.1 x 6.4 x 7.4 cm Endometrial stripe: Approximately 8 mm Right Ovary:1.5 x 0.9 x 0.6 cm Left Ovary: 3.7 x 4.7 x 5.1 cm Ultrasound Findings: Uterus: Uterus has a somewhat heterogeneous myometrial echotexture. Nabothian cysts in the cervix. Endometrial stripe: Endometrial stripe is within normal limits. Right Ovary: What could represent the right ovary appears somewhat small. Left Ovary:  Left ovary contains a cystic structure with faint low level internal echoes measuring 3.3 x 3.4 x 3.7 cm. This is likely a hemorrhagic cyst although endometrioma could have this appearance. There appears to be a fluid-filled tubular structure adjacent to the ovary suggesting possible hydrosalpinx. Free Fluid: No evidence of free fluid. Slightly heterogeneous uterine myometrial echotexture. 3.7 cm complex left ovarian cyst, likely hemorrhagic cyst or endometrioma. Possible hydrosalpinx on the left.        LABS:  UPT: negative    Hospital Outpatient Visit on 08/03/2022   Component Date Value Ref Range Status    HCG(Urine) Pregnancy Test 08/03/2022 NEGATIVE  NEGATIVE Final Comment: Specimens with hCG levels near the threshold of the test (25 mIU/mL) may give a negative or   indeterminate result. In such cases, another test should be performed with a new specimen   in 48-72 hours. If early pregnancy is suspected clinically in this setting, correlation   with quantitative serum b-hCG level is suggested. Hospital Outpatient Visit on 07/27/2022   Component Date Value Ref Range Status    FSH 07/27/2022 12.6  1.7 - 21.5 mIU/mL Final    Comment: Reference Range:  Male:                        1. 5-12.4  Ovulating Female: Follicular Phase           3.5-12.5    Ovulation Phase            4.7-21.5    Luteal Phase               1.7-7.7  Postmenopausal Female:      25.8-134.8      PTT 07/27/2022 22.2 (A) 23.9 - 33.8 sec Final    Comment:       IV Heparin Therapy Range:      62.0-94.0            Protime 07/27/2022 13.0  11.5 - 14.2 sec Final    INR 07/27/2022 1.0   Final    Comment:       Non-therapeutic Range:     INR = 0.9-1.2  Therapeutic Range:    Moderate Anticoagulant Intensity:     INR = 2.0-3.0   High Anticoagulant Intensity:     INR = 2.5-3.5            WBC 07/27/2022 5.8  3.5 - 11.3 k/uL Final    RBC 07/27/2022 4.46  3.95 - 5.11 m/uL Final    Hemoglobin 07/27/2022 10.4 (A) 11.9 - 15.1 g/dL Final    Hematocrit 07/27/2022 33.5 (A) 36.3 - 47.1 % Final    MCV 07/27/2022 75.1 (A) 82.6 - 102.9 fL Final    MCH 07/27/2022 23.3 (A) 25.2 - 33.5 pg Final    MCHC 07/27/2022 31.0  25.2 - 33.5 g/dL Final    RDW 07/27/2022 15.6 (A) 11.8 - 14.4 % Final    Platelets 90/79/1089 318  138 - 453 k/uL Final    MPV 07/27/2022 9.9  8.1 - 13.5 fL Final    NRBC Automated 07/27/2022 0.0  0.0 per 100 WBC Final    Seg Neutrophils 07/27/2022 66 (A) 36 - 65 % Final    Lymphocytes 07/27/2022 23 (A) 24 - 43 % Final    Monocytes 07/27/2022 8  3 - 12 % Final    Eosinophils % 07/27/2022 2  1 - 4 % Final    Basophils 07/27/2022 1  0 - 2 % Final    Immature Granulocytes 07/27/2022 0  0 % Final    Segs Absolute 07/27/2022 3.84  1.50 - 8.10 k/uL Final    Absolute Lymph # 07/27/2022 1.35  1.10 - 3.70 k/uL Final    Absolute Mono # 07/27/2022 0.44  0.10 - 1.20 k/uL Final    Absolute Eos # 07/27/2022 0.10  0.00 - 0.44 k/uL Final    Basophils Absolute 07/27/2022 0.03  0.00 - 0.20 k/uL Final    Absolute Immature Granulocyte 07/27/2022 <0.03  0.00 - 0.30 k/uL Final    RBC Morphology 07/27/2022 ANISOCYTOSIS PRESENT   Final    MICROCYTOSIS PRESENT   ]    Diagnosis:    Diagnosis Orders   1. Anemia, unspecified type  Surgical Pathology      2. History of endometrial ablation  Surgical Pathology    [place] HYSTEROSCOPY,W/ENDO BX      3. Menorrhagia with regular cycle  Surgical Pathology    [place] HYSTEROSCOPY,W/ENDO BX      4. Cervical polyp  Surgical Pathology        Patient Active Problem List    Diagnosis Date Noted    Cyst of left ovary     Pelvic pain in female     Anemia 11/27/2013    Other B-complex deficiencies 08/23/2013       Chaperone for Intimate Exam  Chaperone was offered and accepted as part of the rooming process. Chaperone: Schuyler PADILLA time out was called by the Chaperone listed above while ALL participants were quiet and attentive. The procedure to be completed was confirmed, with the appropriate laterality demarcated prior, if appropriate, as well as the patients name and a unique identifier, her date of birth. All questions were answered to her satisfaction. The consent was signed prior to the time out and all the risks were discussed in detail. Procedure Note:  After voiding, the patient returned to the exam room where she was placed in the dorsal- lithotomy position. A bimanual examination was performed without pathological changes from her most recent history and physical examination; small AV uterus/ cervical stenosis/ endocervical polyp/ endometrial cavity with scar tissue c/w endometrial ablation. A bi-valve speculum was then placed into the vaginal vault allowing visualization of the cervix.   The surgical field was then cleansed with betadine. Under direct visualization an allis was placed on the anterior cervical lip. The uterus was sounded to 8 cm. A endosee Hysteroscope  was then placed thru the endocervical canal and into the endometrial cavity without any complications. A total of 20 ml of normal saline was instilled to aid in visualization of the endometrial cavity anatomy, video was completed. The optics during the case  was limited and the entire cavity was not able to be visualized. During the hysteroscopic procedure an endometrial sampling was performed without incident. Pathology is pending. The patient tolerated the procedure well, the Allis was removed off the anterior cervical lip and there was noted to be adequate hemostasis. The patient was given restrictions and will follow-up in the office in 10-14 days. She will report any abdominal pain, heavy vaginal bleeding or a temperature of greater than 100.4. Hysteroscopic Findings:  Scarring/ cervical stenosis    Assessment:   Diagnosis Orders   1. Anemia, unspecified type  Surgical Pathology      2. History of endometrial ablation  Surgical Pathology    [place] HYSTEROSCOPY,W/ENDO BX      3. Menorrhagia with regular cycle  Surgical Pathology    [place] HYSTEROSCOPY,W/ENDO BX      4. Cervical polyp  Surgical Pathology        Patient Active Problem List    Diagnosis Date Noted    Cyst of left ovary     Pelvic pain in female     Anemia 11/27/2013    Other B-complex deficiencies 08/23/2013           Recommendations:  Return to the office for follow-up in 3-4 weeks to review the pathology of the biopsy and for further recommendations. Patient is considering hysterectomy. Pt to decide on route of hysterectomy with risks/ benefits/ alternative options.       Ivy Carmona DO negative

## 2022-08-05 LAB — SURGICAL PATHOLOGY REPORT: NORMAL

## 2022-08-19 ENCOUNTER — TELEPHONE (OUTPATIENT)
Dept: OBGYN | Age: 52
End: 2022-08-19

## 2022-08-19 ENCOUNTER — TELEMEDICINE (OUTPATIENT)
Dept: OBGYN | Age: 52
End: 2022-08-19
Payer: COMMERCIAL

## 2022-08-19 DIAGNOSIS — D64.9 ANEMIA, UNSPECIFIED TYPE: Primary | ICD-10-CM

## 2022-08-19 DIAGNOSIS — Z98.890 HISTORY OF ENDOMETRIAL ABLATION: ICD-10-CM

## 2022-08-19 DIAGNOSIS — R10.2 PELVIC PAIN IN FEMALE: ICD-10-CM

## 2022-08-19 DIAGNOSIS — N92.0 MENORRHAGIA WITH REGULAR CYCLE: ICD-10-CM

## 2022-08-19 DIAGNOSIS — N83.202 CYST OF LEFT OVARY: ICD-10-CM

## 2022-08-19 PROCEDURE — 99213 OFFICE O/P EST LOW 20 MIN: CPT | Performed by: OBSTETRICS & GYNECOLOGY

## 2022-08-19 NOTE — PROGRESS NOTES
Deisi Gardner  2022    Deisi Gardner (:  1970) has requested an audio/video evaluation for the following concern(s):    1. Anemia, unspecified type    2. History of endometrial ablation    3. Menorrhagia with regular cycle    4. Cyst of left ovary    5. Pelvic pain in female          TELEHEALTH EVALUATION -- Audio/Visual (During DCDRV-49 public health emergency)      Deisi Gardner is a 46 y.o. female       She was here to follow-up regarding her labs and diagnostics ordered at her last visit for the diagnosis of:    ICD-10-CM    1. Anemia, unspecified type  D64.9       2. History of endometrial ablation  Z98.890       3. Menorrhagia with regular cycle  N92.0       4. Cyst of left ovary  N83.202       5. Pelvic pain in female  R10.2           She has any specific chief complaint today. Her bowels are regular and she is voiding without difficulty. Pt still c/o heavy periods with large clots. Pt states her symptoms are increasing in frequency/ intensity. Pt s/p endometrial ablation. Pt with known endometriosis. Pt was counseled on options with risks/ benefits/ alternative options. Pt is requesting definitive surgery.  Pt was counseled on routes of hysterectomy and wishes to have an abdominal hysterectomy      Past Medical History:   Diagnosis Date    Anemia     Anemia     Chronic kidney disease     kidney stones    Endometriosis     Headache     History of blood transfusion     Hyperlipidemia     Obesity     Had Gastric Bypass          Past Surgical History:   Procedure Laterality Date    DILATION AND CURETTAGE  2010    Endometrial Ablation    ENDOMETRIAL ABLATION  2010    GASTRIC BYPASS SURGERY  2003    HERNIA REPAIR      HYSTEROSCOPY  2010    with Endometrial Ablation     LAPAROSCOPY      LITHOTRIPSY Bilateral     OVARIAN CYST REMOVAL Left 10/9/2019    Laparoscopic Left Ovarian Cystectomy performed by Willian Edouard MD at [address] [place] [address] Family History   Problem Relation Age of Onset    Cancer Maternal Uncle         Hodgkin's disease    Cancer Paternal Aunt         Met Breast cancer    Other Mother         fluid around heart          Social History     Tobacco Use    Smoking status: Never    Smokeless tobacco: Never   Vaping Use    Vaping Use: Never used   Substance Use Topics    Alcohol use: No    Drug use: No         MEDICATIONS:  Current Outpatient Medications   Medication Sig Dispense Refill    ferrous sulfate (IRON 325) 325 (65 Fe) MG tablet Take 325 mg by mouth in the morning and 325 mg in the evening. diclofenac sodium (VOLTAREN) 1 % GEL Apply 2 g topically 4 times daily (Patient taking differently: Apply 2 g topically as needed) 50 g 1     No current facility-administered medications for this visit. ALLERGIES:  Allergies as of 08/19/2022    (No Known Allergies)         Last menstrual period 07/25/2022, not currently breastfeeding. PE is limited due to the virtual visit    Abdomen: Soft non-tender; good bowel sounds. No guarding, rebound or rigidity. No CVA tenderness bilaterally reported when questioned. (Viewed Virtually)    Extremities: No calf tenderness, DTR 2/4, and No edema bilaterally as inspected by video and palpation by the patient (Viewed Virtually)    Pelvic: (Virtual Visit-Not Completed)    Diagnostics:  US PELVIS COMPLETE NON-OB TRANSABDOMINAL AND TRANSVAGINAL    Result Date: 8/1/2022  EXAMINATION: TRANSABDOMINAL AND TRANSVAGINAL PELVIC ULTRASOUND 7/29/2022 TECHNIQUE: Transabdominal and transvaginal pelvic ultrasound was performed.  COMPARISON: 12/31/2019 10/03/2019 HISTORY: ORDERING SYSTEM PROVIDED HISTORY: Menorrhagia with regular cycle TECHNOLOGIST PROVIDED HISTORY: Reason for Exam: Menorrhagia with regular cycle, Anemia, unspecified type, History of endometrial ablation FINDINGS: Measurements: Uterus: 11.1 x 6.4 x 7.4 cm Endometrial stripe: Approximately 8 mm Right Ovary:1.5 x 0.9 x 0.6 cm Left Ovary: 3.7 x 4.7 x 5.1 cm Ultrasound Findings: Uterus: Uterus has a somewhat heterogeneous myometrial echotexture. Nabothian cysts in the cervix. Endometrial stripe: Endometrial stripe is within normal limits. Right Ovary: What could represent the right ovary appears somewhat small. Left Ovary:  Left ovary contains a cystic structure with faint low level internal echoes measuring 3.3 x 3.4 x 3.7 cm. This is likely a hemorrhagic cyst although endometrioma could have this appearance. There appears to be a fluid-filled tubular structure adjacent to the ovary suggesting possible hydrosalpinx. Free Fluid: No evidence of free fluid. Slightly heterogeneous uterine myometrial echotexture. 3.7 cm complex left ovarian cyst, likely hemorrhagic cyst or endometrioma. Possible hydrosalpinx on the left. Lab Results:  Results for orders placed or performed during the hospital encounter of 08/03/22   SURGICAL PATHOLOGY REPORT   Result Value Ref Range    Surgical Pathology Report       -- Diagnosis --  A.  ENDOCERVIX, BIOPSY/CURETTAGE:  - BENIGN ENDOCERVICAL TISSUE AND MUCUS. B.  CERVIX, POLYPECTOMY:  - BENIGN ENDOCERVICAL POLYP WITH SEVERE ACUTE AND CHRONIC   INFLAMMATION AND FOCAL DYSTROPHIC MICROCALCIFICATION. Chadwick Calderon M.D.  **Electronically Signed Out**         sls/8/5/2022       Clinical Information  Pre-op Diagnosis:  ANEMIA, UNSPECIFIED TYPE, HISTORY OF ENDOMETRIAL  ABLATION, MENORRHAGIA WITH REGULAR CYCLE; CERVICAL POLYP     Operative Findings:  ENDO BX; POLYP (PER CONTAINER)     Source of Specimen  A: ENDO BX  B: CERVICAL POLYP    Gross Description  A. \"ELIZABETH VASQUES, ENDO BX\" Multiple tan-brown tissue fragments and  mucoid material, 2.0 x 1.5 x 0.1 cm in aggregate. Entirely 1cs. B. \"ELIZABETH VASQUES, POLYP\" Two tan-brown polypoid tissue fragments,  0.4 cm each and are 0.8 x 0.3 x 0.2 cm in aggregate. Entirely 1cs. yr tm      Microscopic Description  A, B. Microscopic examination performed. Vitals/Constitutional/EENT/Resp/CV/GI//MS/Neuro/Skin/Heme-Lymph-Imm. Pursuant to the emergency declaration under the [address] [place] [address] and the [employer] Act, this Virtual Visit was conducted with patient's (and/or legal guardian's) consent, to reduce the patient's risk of exposure to COVID-19 and provide necessary medical care. The patient (and/or legal guardian) has also been advised to contact this office for worsening conditions or problems, and seek emergency medical treatment and/or call 911 if deemed necessary. Services were provided through a video synchronous discussion virtually to substitute for in-person clinic visit. Patient and provider were located at their individual homes. Electronically signed by Keaton Raymundo DO on 8/19/22 at 11:53 AM EDT     An electronic signature was used to authenticate this note. The patient, Sonali Jack is a 46 y.o. female, was seen with a total time spent of 20 minutes for the visit on this date of service by the E/M provider. The time component had both face to face and non face to face time spent in determining the total time component. Counseling and education regarding her diagnosis listed below and her options regarding those diagnoses were also included in determining her time component. Diagnosis Orders   1. Anemia, unspecified type        2. History of endometrial ablation        3. Menorrhagia with regular cycle        4. Cyst of left ovary        5. Pelvic pain in female             The patient had her preventative health maintenance recommendations and follow-up reviewed with her at the completion of her visit.

## 2022-08-19 NOTE — TELEPHONE ENCOUNTER
50 g 1     No current facility-administered medications for this visit. Scheduled Meds:  Continuous Infusions:  PRN Meds:. Prior to Admission medications    Medication Sig Start Date End Date Taking? Authorizing Provider   ferrous sulfate (IRON 325) 325 (65 Fe) MG tablet Take 325 mg by mouth in the morning and 325 mg in the evening. Historical Provider, MD   diclofenac sodium (VOLTAREN) 1 % GEL Apply 2 g topically 4 times daily  Patient taking differently: Apply 2 g topically as needed 11/17/21   JUS Latham - CNP     Vital Signs (Current) [unfilled]    Weight:   Wt Readings from Last 1 Encounters:   07/27/22 200 lb (90.7 kg)     Height:   Ht Readings from Last 1 Encounters:   07/27/22 5' 4\" (1.626 m)      BMI:  There is no height or weight on file to calculate BMI. Estimated body mass index is 34.33 kg/m² as calculated from the following:    Height as of 7/27/22: 5' 4\" (1.626 m). Weight as of 7/27/22: 200 lb (90.7 kg). body mass index is unknown because there is no height or weight on file. Cardiac Clearance: To be determined by anesthesia   Medical Clearance: To be determined by anesthesia    Appointment for surgery Clearance scheduled for:None     Preoperative Testing:   These are the current and completed labs:  CBC:   Lab Results   Component Value Date/Time    WBC 5.8 07/27/2022 01:22 PM    RBC 4.46 07/27/2022 01:22 PM    HGB 10.4 07/27/2022 01:22 PM    HCT 33.5 07/27/2022 01:22 PM    MCV 75.1 07/27/2022 01:22 PM    RDW 15.6 07/27/2022 01:22 PM     07/27/2022 01:22 PM     CMP:   Lab Results   Component Value Date/Time     11/04/2021 07:44 AM    K 4.7 11/04/2021 07:44 AM     11/04/2021 07:44 AM    CO2 26 11/04/2021 07:44 AM    BUN 11 11/04/2021 07:44 AM    CREATININE 0.50 11/04/2021 07:44 AM    GFRAA >60 11/04/2021 07:44 AM    LABGLOM >60 11/04/2021 07:44 AM    GLUCOSE 95 11/04/2021 07:44 AM    PROT 7.5 11/04/2021 07:44 AM    CALCIUM 9.8 11/04/2021 07:44 AM BILITOT 0.96 11/04/2021 07:44 AM    ALKPHOS 117 11/04/2021 07:44 AM    AST 14 11/04/2021 07:44 AM    ALT 9 11/04/2021 07:44 AM     POC Tests: No results for input(s): POCGLU, POCNA, POCK, POCCL, POCBUN, POCHEMO, POCHCT in the last 72 hours.   Coags    Lab Results   Component Value Date/Time    PROTIME 13.0 07/27/2022 01:22 PM    INR 1.0 07/27/2022 01:22 PM    APTT 22.2 07/27/2022 01:22 PM     HCG (If Applicable)   Lab Results   Component Value Date    PREGTESTUR NEGATIVE 08/03/2022    HCGQUANT <1 10/08/2019      ABGs No results found for: PHART, PO2ART, QSW3TKC, QRC4SUZ, BEART, X8GCKPTM   Type & Screen (If Applicable)  No results found for: Shyrl Puna    Additional ordered pre-operative testing:  []CBC    []ABG      [] BMP   []URINALYSIS   []CMP    []HCG   []COAGS PT/INR  []T&C  []LFTs   []TYPE AND SCREEN    [] EKG  [] Chest X-Ray  [] Other Radiology    [] Sent to Hospitalist None  [] Sent to Anesthesia for your review:  CRNA's    [] Additional Orders: None     Comments:None   Requests: None    Signed: David Kinney LPN 4/03/7768 96:22 AM

## 2022-08-19 NOTE — TELEPHONE ENCOUNTER
Please review chart to determine if patient can have surgery here to needs to be referred elsewhere.

## 2022-09-22 ENCOUNTER — HOSPITAL ENCOUNTER (OUTPATIENT)
Dept: ULTRASOUND IMAGING | Age: 52
Discharge: HOME | End: 2022-09-24
Payer: COMMERCIAL

## 2022-09-22 ENCOUNTER — OFFICE VISIT (OUTPATIENT)
Dept: OBGYN | Age: 52
End: 2022-09-22
Payer: COMMERCIAL

## 2022-09-22 ENCOUNTER — PREP FOR PROCEDURE (OUTPATIENT)
Dept: OBGYN | Age: 52
End: 2022-09-22

## 2022-09-22 ENCOUNTER — HOSPITAL ENCOUNTER (OUTPATIENT)
Dept: NON INVASIVE DIAGNOSTICS | Age: 52
Discharge: HOME | End: 2022-09-22
Payer: COMMERCIAL

## 2022-09-22 ENCOUNTER — HOSPITAL ENCOUNTER (OUTPATIENT)
Dept: LAB | Age: 52
Discharge: HOME | End: 2022-09-22
Payer: COMMERCIAL

## 2022-09-22 ENCOUNTER — HOSPITAL ENCOUNTER (OUTPATIENT)
Dept: GENERAL RADIOLOGY | Age: 52
Discharge: HOME | End: 2022-09-24
Payer: COMMERCIAL

## 2022-09-22 VITALS
SYSTOLIC BLOOD PRESSURE: 118 MMHG | OXYGEN SATURATION: 100 % | HEART RATE: 67 BPM | BODY MASS INDEX: 33.26 KG/M2 | HEIGHT: 64 IN | DIASTOLIC BLOOD PRESSURE: 66 MMHG | WEIGHT: 194.8 LBS

## 2022-09-22 DIAGNOSIS — Z98.890 HISTORY OF ENDOMETRIAL ABLATION: ICD-10-CM

## 2022-09-22 DIAGNOSIS — Z01.818 PRE-OP TESTING: ICD-10-CM

## 2022-09-22 DIAGNOSIS — N92.0 MENORRHAGIA WITH REGULAR CYCLE: Primary | ICD-10-CM

## 2022-09-22 DIAGNOSIS — N83.202 CYST OF LEFT OVARY: ICD-10-CM

## 2022-09-22 DIAGNOSIS — D64.9 ANEMIA, UNSPECIFIED TYPE: ICD-10-CM

## 2022-09-22 DIAGNOSIS — R10.2 PELVIC PAIN IN FEMALE: ICD-10-CM

## 2022-09-22 LAB
ABSOLUTE EOS #: 0.09 K/UL (ref 0–0.44)
ABSOLUTE IMMATURE GRANULOCYTE: <0.03 K/UL (ref 0–0.3)
ABSOLUTE LYMPH #: 1.75 K/UL (ref 1.1–3.7)
ABSOLUTE MONO #: 0.55 K/UL (ref 0.1–1.2)
ALBUMIN SERPL-MCNC: 4.4 G/DL (ref 3.5–5.2)
ALBUMIN/GLOBULIN RATIO: 1.5 (ref 1–2.5)
ALP BLD-CCNC: 109 U/L (ref 35–104)
ALT SERPL-CCNC: 10 U/L (ref 5–33)
ANION GAP SERPL CALCULATED.3IONS-SCNC: 9 MMOL/L (ref 9–17)
AST SERPL-CCNC: 17 U/L
BASOPHILS # BLD: 1 % (ref 0–2)
BASOPHILS ABSOLUTE: 0.04 K/UL (ref 0–0.2)
BILIRUB SERPL-MCNC: 0.6 MG/DL (ref 0.3–1.2)
BUN BLDV-MCNC: 11 MG/DL (ref 6–20)
BUN/CREAT BLD: 22 (ref 9–20)
CALCIUM SERPL-MCNC: 9.4 MG/DL (ref 8.6–10.4)
CHLORIDE BLD-SCNC: 103 MMOL/L (ref 98–107)
CO2: 26 MMOL/L (ref 20–31)
CREAT SERPL-MCNC: 0.51 MG/DL (ref 0.5–0.9)
EOSINOPHILS RELATIVE PERCENT: 1 % (ref 1–4)
GFR AFRICAN AMERICAN: >60 ML/MIN
GFR NON-AFRICAN AMERICAN: >60 ML/MIN
GFR SERPL CREATININE-BSD FRML MDRD: ABNORMAL ML/MIN/{1.73_M2}
GLUCOSE BLD-MCNC: 93 MG/DL (ref 70–99)
HCT VFR BLD CALC: 36.3 % (ref 36.3–47.1)
HEMOGLOBIN: 11.3 G/DL (ref 11.9–15.1)
IMMATURE GRANULOCYTES: 0 %
LYMPHOCYTES # BLD: 28 % (ref 24–43)
MCH RBC QN AUTO: 24.1 PG (ref 25.2–33.5)
MCHC RBC AUTO-ENTMCNC: 31.1 G/DL (ref 25.2–33.5)
MCV RBC AUTO: 77.6 FL (ref 82.6–102.9)
MONOCYTES # BLD: 9 % (ref 3–12)
NRBC AUTOMATED: 0 PER 100 WBC
PDW BLD-RTO: 19.6 % (ref 11.8–14.4)
PLATELET # BLD: 301 K/UL (ref 138–453)
PMV BLD AUTO: 10.2 FL (ref 8.1–13.5)
POTASSIUM SERPL-SCNC: 4.7 MMOL/L (ref 3.7–5.3)
RBC # BLD: 4.68 M/UL (ref 3.95–5.11)
RBC # BLD: ABNORMAL 10*6/UL
SEG NEUTROPHILS: 61 % (ref 36–65)
SEGMENTED NEUTROPHILS ABSOLUTE COUNT: 3.92 K/UL (ref 1.5–8.1)
SODIUM BLD-SCNC: 138 MMOL/L (ref 135–144)
TOTAL PROTEIN: 7.3 G/DL (ref 6.4–8.3)
WBC # BLD: 6.4 K/UL (ref 3.5–11.3)

## 2022-09-22 PROCEDURE — 93005 ELECTROCARDIOGRAM TRACING: CPT

## 2022-09-22 PROCEDURE — 76856 US EXAM PELVIC COMPLETE: CPT

## 2022-09-22 PROCEDURE — 99213 OFFICE O/P EST LOW 20 MIN: CPT | Performed by: OBSTETRICS & GYNECOLOGY

## 2022-09-22 PROCEDURE — 36415 COLL VENOUS BLD VENIPUNCTURE: CPT

## 2022-09-22 PROCEDURE — 71046 X-RAY EXAM CHEST 2 VIEWS: CPT

## 2022-09-22 PROCEDURE — 85025 COMPLETE CBC W/AUTO DIFF WBC: CPT

## 2022-09-22 PROCEDURE — 80053 COMPREHEN METABOLIC PANEL: CPT

## 2022-09-22 RX ORDER — SODIUM CHLORIDE, SODIUM LACTATE, POTASSIUM CHLORIDE, CALCIUM CHLORIDE 600; 310; 30; 20 MG/100ML; MG/100ML; MG/100ML; MG/100ML
INJECTION, SOLUTION INTRAVENOUS CONTINUOUS
Status: CANCELLED | OUTPATIENT
Start: 2022-09-22

## 2022-09-22 RX ORDER — SODIUM CHLORIDE 0.9 % (FLUSH) 0.9 %
5-40 SYRINGE (ML) INJECTION EVERY 12 HOURS SCHEDULED
Status: CANCELLED | OUTPATIENT
Start: 2022-09-22

## 2022-09-22 RX ORDER — SODIUM CHLORIDE 9 MG/ML
INJECTION, SOLUTION INTRAVENOUS PRN
Status: CANCELLED | OUTPATIENT
Start: 2022-09-22

## 2022-09-22 RX ORDER — SODIUM CHLORIDE 0.9 % (FLUSH) 0.9 %
5-40 SYRINGE (ML) INJECTION PRN
Status: CANCELLED | OUTPATIENT
Start: 2022-09-22

## 2022-09-22 NOTE — H&P (VIEW-ONLY)
[facility]  1970  Primary Care Physician: No primary care provider on file. [facility]: [place]      2022  MEDICAID CONSENT COMPLETED: No      HPI: Radha Moe is a 46 y.o. female   she is being seen for the problems listed below and is planning surgical intervention. She was counseled on all conservative medical and surgical options as well as definitive procedures as well. Pt is c/o heavy menses with clots with cramping. Pt had an endometrial ablation. Pt has had permanent sterilization. Pt wishes to have definitive surgery due to her symptoms affecting her ADL. Pt with recurrent ovarian cysts. Pt wishes to have her ovaries removed at the time of her surgery. Pt was counseled on risks/ benefits/ alternative options of surgery. Pt wishes to proceed. 1. Menorrhagia with regular cycle    2. Anemia, unspecified type    3. History of endometrial ablation    4. Pelvic pain in female    5.  Cyst of left ovary          Relevant Past History:   Patient Active Problem List    Diagnosis Date Noted    Cyst of left ovary     Pelvic pain in female     Anemia 2013    Other B-complex deficiencies 2013         OB History    Para Term  AB Living   2 2 2 0 0 2   SAB IAB Ectopic Molar Multiple Live Births   0 0 0 0 0 2      # Outcome Date GA Lbr Trey/2nd Weight Sex Delivery Anes PTL Lv   2 Term  42w0d  8 lb (3.629 kg) F Vag-Spont  N AMARILIS   1 Term  42w0d   M Vag-Spont OTHER N AMARILIS       Past Medical History:   Diagnosis Date    Anemia     Anemia     Chronic kidney disease     kidney stones    Endometriosis     Headache     History of blood transfusion     Hyperlipidemia     Obesity     Had Gastric Bypass        Past Surgical History:   Procedure Laterality Date    DILATION AND CURETTAGE  2010    Endometrial Ablation    ENDOMETRIAL ABLATION  2010    GASTRIC BYPASS SURGERY      HERNIA diarrhea  : Denies: dysuria, frequency/urgency, hematuria, pelvic pain  MUSCULOSKELETAL: Denies: back pain, joint swelling, muscle pain  SKIN: Denies: rash, hives. HEMATOLOGIC: Denies Bleeding Disorder, Coagulopathy or Transfusion  NEUROLOGIC: Denies Migraines, Headaches, CVA, TIA  ENDOCRINE: Denies any Endocrinologic disorders                PHYSICAL EXAM:  Blood pressure 118/66, pulse 67, height 5' 4\" (1.626 m), weight 194 lb 12.8 oz (88.4 kg), last menstrual period 08/24/2022, SpO2 100 %, not currently breastfeeding. General Appearance:  awake, alert, oriented, in no acute distress, well developed, well nourished, in no acute distress   Skin:  Skin color, texture, turgor normal. No rashes or lesions  Mouth/Throat:  Mucosa moist.  No lesions. Pharynx without erythema, edema or exudate. Lungs:  Normal expansion. Clear to auscultation. No rales, rhonchi, or wheezing. Heart:  Heart sounds are normal.  Regular rate and rhythm without murmur, gallop or rub. Breast:  xDeclined  Abdomen:  Soft, non-tender, normal bowel sounds. No bruits, organomegaly or masses. Extremities: Extremities warm to touch, pink, with no edema. Musculoskeletal:  negative  Peripheral Pulses:  Normal  Neurologic:  Gait normal. Reflexes normal and symmetric. Sensation grossly intact. Female Urogen:  xDeclined  Female Rectal: xDeclined        Diagnostics:  EXAMINATION:   TRANSABDOMINAL AND TRANSVAGINAL PELVIC ULTRASOUND       7/29/2022       TECHNIQUE:   Transabdominal and transvaginal pelvic ultrasound was performed.        COMPARISON:   12/31/2019 10/03/2019       HISTORY:   ORDERING SYSTEM PROVIDED HISTORY: Menorrhagia with regular cycle   TECHNOLOGIST PROVIDED HISTORY:   Reason for Exam: Menorrhagia with regular cycle, Anemia, unspecified type,   History of endometrial ablation       FINDINGS:       Measurements:       Uterus: 11.1 x 6.4 x 7.4 cm       Endometrial stripe: Approximately 8 mm       Right Ovary:1.5 x 0.9 x 0.6 cm       Left Ovary: 3.7 x 4.7 x 5.1 cm           Ultrasound Findings:       Uterus: Uterus has a somewhat heterogeneous myometrial echotexture. Nabothian cysts in the cervix. Endometrial stripe: Endometrial stripe is within normal limits. Right Ovary: What could represent the right ovary appears somewhat small. Left Ovary:  Left ovary contains a cystic structure with faint low level   internal echoes measuring 3.3 x 3.4 x 3.7 cm. This is likely a hemorrhagic   cyst although endometrioma could have this appearance. There appears to be a   fluid-filled tubular structure adjacent to the ovary suggesting possible   hydrosalpinx. Free Fluid: No evidence of free fluid. Impression   Slightly heterogeneous uterine myometrial echotexture. 3.7 cm complex left ovarian cyst, likely hemorrhagic cyst or endometrioma. Possible hydrosalpinx on the left. Lab Results:  Results for orders placed or performed during the hospital encounter of 08/03/22   SURGICAL PATHOLOGY REPORT   Result Value Ref Range    Surgical Pathology Report       -- Diagnosis --  A.  ENDOCERVIX, BIOPSY/CURETTAGE:  - BENIGN ENDOCERVICAL TISSUE AND MUCUS. B.  CERVIX, POLYPECTOMY:  - BENIGN ENDOCERVICAL POLYP WITH SEVERE ACUTE AND CHRONIC   INFLAMMATION AND FOCAL DYSTROPHIC MICROCALCIFICATION. Desmond Chong. Emigdio López M.D.  **Electronically Signed Out**         sls/8/5/2022       Clinical Information  Pre-op Diagnosis:  ANEMIA, UNSPECIFIED TYPE, HISTORY OF ENDOMETRIAL  ABLATION, MENORRHAGIA WITH REGULAR CYCLE; CERVICAL POLYP     Operative Findings:  ENDO BX; POLYP (PER CONTAINER)     Source of Specimen  A: ENDO BX  B: CERVICAL POLYP    Gross Description  A. \"ELIZABETH VASQUES, ENDO BX\" Multiple tan-brown tissue fragments and  mucoid material, 2.0 x 1.5 x 0.1 cm in aggregate. Entirely 1cs. B.   \"ELIZABETH VASQUES, POLYP\" Two tan-brown polypoid tissue fragments,  0.4 cm each and are 0.8 x 0.3 x 0.2 cm in aggregate. Entirely 1cs. yr tm      Microscopic Description  A, B. Microscopic examination performed. SURGICAL PATHOLOGY CONSULTATION        Patient Name: Bull Madrigal Med Rec: [id number]  Path Number: [id number]    [address] [place] [address], [facility] [address]. [facility], [address]  [phone]  Fax: [phone]             The patient was counseled at length about the risks of estefany Covid-19 in the narciso-operative and post-operative states including the recovery window of their procedure. The patient was made aware that estefany Covid-19 after a surgical procedure may worsen their prognosis for recovering from the virus and lend to a higher morbidity and or mortality risk. The patient was given the options of postponing their procedure. All of the risks, benefits, and alternatives were discussed. The patient  does wish to proceed with the procedure. Assessment:   Diagnosis Orders   1. Menorrhagia with regular cycle        2. Anemia, unspecified type        3. History of endometrial ablation        4. Pelvic pain in female        5. Cyst of left ovary            Patient Active Problem List    Diagnosis Date Noted    Cyst of left ovary     Pelvic pain in female     Anemia 11/27/2013    Other B-complex deficiencies 08/23/2013       PAP: 11/3/2021 with negative HPV  Permanent Sterilization Completed: Yes     Plan:  LESA  No orders of the defined types were placed in this encounter. Counseling: The patient was counseled on all options both medical and surgical, conservative as well as definitive. She has elected to proceed with the procedure as stated above. The patient was counseled on the procedure. Risks and complications were reviewed in detail. The patients orders, labs, consents have been completed.  The history and physical as well as all supporting surgical documentation will be forwarded to the pre-operative holding area. The patient is aware that this procedure may not alleviate her symptoms. That there may be a necessity for a second surgery and that there may be an incomplete removal of abnormal tissue. The patients that are undergoing a procedure for family planning WERE INSTRUCTED THAT THE PROCEDURE IS PERMANENT AND NON REVERSIBLE. FAILURE RATES OF 18-20/1000 CASES WERE REVIEWED AS WELL AS ALL ALTERNATE REVERSIBLE FORMS OF BIRTH CONTROL MALE AND FEMALE. THEY WERE COUNSELED THAT A FAILURE WOULD MOST LIKELY END UP IN AN ECTOPIC PREGNANCY, A PREGNANCY OUTSIDE OF THE UTERUS MOST COMMONLY IN THE FALLOPIAN TUBE, NECESSITATING FURTHER SURGERY, A BLOOD TRANSFUSION OR BOTH. POST TUBAL STERILIZATION SYNDROME WAS ALSO DISCUSSED WITH THE PATIENT AT LENGTH.              ________________________________________D. O.  Date:_______________  Hever Cabrera DO

## 2022-09-22 NOTE — H&P
[facility]  1970  Primary Care Physician: No primary care provider on file. 140 [facility]: [place]      2022  MEDICAID CONSENT COMPLETED: No      HPI: Halley Hikcs is a 46 y.o. female   she is being seen for the problems listed below and is planning surgical intervention. She was counseled on all conservative medical and surgical options as well as definitive procedures as well. Pt is c/o heavy menses with clots with cramping. Pt had an endometrial ablation. Pt has had permanent sterilization. Pt wishes to have definitive surgery due to her symptoms affecting her ADL. Pt with recurrent ovarian cysts. Pt wishes to have her ovaries removed at the time of her surgery. Pt was counseled on risks/ benefits/ alternative options of surgery. Pt wishes to proceed. 1. Menorrhagia with regular cycle    2. Anemia, unspecified type    3. History of endometrial ablation    4. Pelvic pain in female    5.  Cyst of left ovary          Relevant Past History:   Patient Active Problem List    Diagnosis Date Noted    Cyst of left ovary     Pelvic pain in female     Anemia 2013    Other B-complex deficiencies 2013         OB History    Para Term  AB Living   2 2 2 0 0 2   SAB IAB Ectopic Molar Multiple Live Births   0 0 0 0 0 2      # Outcome Date GA Lbr Trey/2nd Weight Sex Delivery Anes PTL Lv   2 Term  42w0d  8 lb (3.629 kg) F Vag-Spont  N AMARILIS   1 Term  42w0d   M Vag-Spont OTHER N AMARILIS       Past Medical History:   Diagnosis Date    Anemia     Anemia     Chronic kidney disease     kidney stones    Endometriosis     Headache     History of blood transfusion     Hyperlipidemia     Obesity     Had Gastric Bypass        Past Surgical History:   Procedure Laterality Date    DILATION AND CURETTAGE  2010    Endometrial Ablation    ENDOMETRIAL ABLATION  2010    GASTRIC BYPASS SURGERY      HERNIA REPAIR  2003    HYSTEROSCOPY  12/2010    with Endometrial Ablation     LAPAROSCOPY      LITHOTRIPSY Bilateral     OVARIAN CYST REMOVAL Left 10/9/2019    Laparoscopic Left Ovarian Cystectomy performed by Edvin Perez MD at [address] History     Socioeconomic History    Marital status:      Spouse name: Not on file    Number of children: Not on file    Years of education: Not on file    Highest education level: Not on file   Occupational History    Not on file   Tobacco Use    Smoking status: Never    Smokeless tobacco: Never   Vaping Use    Vaping Use: Never used   Substance and Sexual Activity    Alcohol use: No    Drug use: No    Sexual activity: Yes     Partners: Male   Other Topics Concern    Not on file   Social History Narrative    Not on file     Social Determinants of Health     Financial Resource Strain: Not on file   Food Insecurity: Not on file   Transportation Needs: Not on file   Physical Activity: Not on file   Stress: Not on file   Social Connections: Not on file   Intimate Partner Violence: Not on file   Housing Stability: Not on file       Psychosocial History: denies    MEDICATIONS:  Current Outpatient Medications   Medication Sig Dispense Refill    ferrous sulfate (IRON 325) 325 (65 Fe) MG tablet Take 325 mg by mouth in the morning and 325 mg in the evening. diclofenac sodium (VOLTAREN) 1 % GEL Apply 2 g topically 4 times daily (Patient taking differently: Apply 2 g topically as needed) 50 g 1     No current facility-administered medications for this visit. ALLERGIES:  Allergies as of 09/22/2022    (No Known Allergies)           REVIEW OF SYSTEMS:      General appearance:Appears healthy. Alert; in no acute distress. Pleasant.   HEENT: Unremarkable   CARDIOVASCULAR: Denies: chest pain, dyspnea on exertion, palpitations  RESPIRATORY: Denies: cough, shortness of breath, wheezing  GI: Denies: abdominal pain, flank pain, nausea, vomiting, diarrhea  : Denies: dysuria, frequency/urgency, hematuria, pelvic pain  MUSCULOSKELETAL: Denies: back pain, joint swelling, muscle pain  SKIN: Denies: rash, hives. HEMATOLOGIC: Denies Bleeding Disorder, Coagulopathy or Transfusion  NEUROLOGIC: Denies Migraines, Headaches, CVA, TIA  ENDOCRINE: Denies any Endocrinologic disorders                PHYSICAL EXAM:  Blood pressure 118/66, pulse 67, height 5' 4\" (1.626 m), weight 194 lb 12.8 oz (88.4 kg), last menstrual period 08/24/2022, SpO2 100 %, not currently breastfeeding. General Appearance:  awake, alert, oriented, in no acute distress, well developed, well nourished, in no acute distress   Skin:  Skin color, texture, turgor normal. No rashes or lesions  Mouth/Throat:  Mucosa moist.  No lesions. Pharynx without erythema, edema or exudate. Lungs:  Normal expansion. Clear to auscultation. No rales, rhonchi, or wheezing. Heart:  Heart sounds are normal.  Regular rate and rhythm without murmur, gallop or rub. Breast:  xDeclined  Abdomen:  Soft, non-tender, normal bowel sounds. No bruits, organomegaly or masses. Extremities: Extremities warm to touch, pink, with no edema. Musculoskeletal:  negative  Peripheral Pulses:  Normal  Neurologic:  Gait normal. Reflexes normal and symmetric. Sensation grossly intact. Female Urogen:  xDeclined  Female Rectal: xDeclined        Diagnostics:  EXAMINATION:   TRANSABDOMINAL AND TRANSVAGINAL PELVIC ULTRASOUND       7/29/2022       TECHNIQUE:   Transabdominal and transvaginal pelvic ultrasound was performed.        COMPARISON:   12/31/2019 10/03/2019       HISTORY:   ORDERING SYSTEM PROVIDED HISTORY: Menorrhagia with regular cycle   TECHNOLOGIST PROVIDED HISTORY:   Reason for Exam: Menorrhagia with regular cycle, Anemia, unspecified type,   History of endometrial ablation       FINDINGS:       Measurements:       Uterus: 11.1 x 6.4 x 7.4 cm       Endometrial stripe: Approximately 8 mm       Right Ovary:1.5 x 0.9 x 0.6 cm       Left Ovary: 3.7 x 4.7 x 5.1 cm           Ultrasound Findings:       Uterus: Uterus has a somewhat heterogeneous myometrial echotexture. Nabothian cysts in the cervix. Endometrial stripe: Endometrial stripe is within normal limits. Right Ovary: What could represent the right ovary appears somewhat small. Left Ovary:  Left ovary contains a cystic structure with faint low level   internal echoes measuring 3.3 x 3.4 x 3.7 cm. This is likely a hemorrhagic   cyst although endometrioma could have this appearance. There appears to be a   fluid-filled tubular structure adjacent to the ovary suggesting possible   hydrosalpinx. Free Fluid: No evidence of free fluid. Impression   Slightly heterogeneous uterine myometrial echotexture. 3.7 cm complex left ovarian cyst, likely hemorrhagic cyst or endometrioma. Possible hydrosalpinx on the left. Lab Results:  Results for orders placed or performed during the hospital encounter of 08/03/22   SURGICAL PATHOLOGY REPORT   Result Value Ref Range    Surgical Pathology Report       -- Diagnosis --  A.  ENDOCERVIX, BIOPSY/CURETTAGE:  - BENIGN ENDOCERVICAL TISSUE AND MUCUS. B.  CERVIX, POLYPECTOMY:  - BENIGN ENDOCERVICAL POLYP WITH SEVERE ACUTE AND CHRONIC   INFLAMMATION AND FOCAL DYSTROPHIC MICROCALCIFICATION. Chadwick Calderon M.D.  **Electronically Signed Out**         sls/8/5/2022       Clinical Information  Pre-op Diagnosis:  ANEMIA, UNSPECIFIED TYPE, HISTORY OF ENDOMETRIAL  ABLATION, MENORRHAGIA WITH REGULAR CYCLE; CERVICAL POLYP     Operative Findings:  ENDO BX; POLYP (PER CONTAINER)     Source of Specimen  A: ENDO BX  B: CERVICAL POLYP    Gross Description  A. \"ELIZABETH VASQUES, ENDO BX\" Multiple tan-brown tissue fragments and  mucoid material, 2.0 x 1.5 x 0.1 cm in aggregate. Entirely 1cs. B.   \"ELIZABETHVALERIE VASQUES, POLYP\" Two tan-brown polypoid tissue fragments,  0.4 cm each and are 0.8 x 0.3 x 0.2 cm in aggregate. Entirely 1cs. yr tm      Microscopic Description  A, B. Microscopic examination performed. SURGICAL PATHOLOGY CONSULTATION        Patient Name: Tye Bustamante Med Rec: [id number]  Path Number: [id number]    [address] [place] [address], [facility] [address]. [facility], [address]  [phone]  Fax: [phone]             The patient was counseled at length about the risks of estefany Covid-19 in the narciso-operative and post-operative states including the recovery window of their procedure. The patient was made aware that estefany Covid-19 after a surgical procedure may worsen their prognosis for recovering from the virus and lend to a higher morbidity and or mortality risk. The patient was given the options of postponing their procedure. All of the risks, benefits, and alternatives were discussed. The patient  does wish to proceed with the procedure. Assessment:   Diagnosis Orders   1. Menorrhagia with regular cycle        2. Anemia, unspecified type        3. History of endometrial ablation        4. Pelvic pain in female        5. Cyst of left ovary            Patient Active Problem List    Diagnosis Date Noted    Cyst of left ovary     Pelvic pain in female     Anemia 11/27/2013    Other B-complex deficiencies 08/23/2013       PAP: 11/3/2021 with negative HPV  Permanent Sterilization Completed: Yes     Plan:  LESA  No orders of the defined types were placed in this encounter. Counseling: The patient was counseled on all options both medical and surgical, conservative as well as definitive. She has elected to proceed with the procedure as stated above. The patient was counseled on the procedure. Risks and complications were reviewed in detail. The patients orders, labs, consents have been completed.  The history and physical as well as all supporting surgical documentation will be forwarded to the pre-operative holding area. The patient is aware that this procedure may not alleviate her symptoms. That there may be a necessity for a second surgery and that there may be an incomplete removal of abnormal tissue. The patients that are undergoing a procedure for family planning WERE INSTRUCTED THAT THE PROCEDURE IS PERMANENT AND NON REVERSIBLE. FAILURE RATES OF 18-20/1000 CASES WERE REVIEWED AS WELL AS ALL ALTERNATE REVERSIBLE FORMS OF BIRTH CONTROL MALE AND FEMALE. THEY WERE COUNSELED THAT A FAILURE WOULD MOST LIKELY END UP IN AN ECTOPIC PREGNANCY, A PREGNANCY OUTSIDE OF THE UTERUS MOST COMMONLY IN THE FALLOPIAN TUBE, NECESSITATING FURTHER SURGERY, A BLOOD TRANSFUSION OR BOTH. POST TUBAL STERILIZATION SYNDROME WAS ALSO DISCUSSED WITH THE PATIENT AT LENGTH.              ________________________________________D. O.  Date:_______________  Augustina Seip, DO

## 2022-09-22 NOTE — PROGRESS NOTES
[address] [facility]  1970  Primary Care Physician: No primary care provider on file. 140 [facility]: [place]      2022  MEDICAID CONSENT COMPLETED: No      HPI: Rudy Huynh is a 46 y.o. female   she is being seen for the problems listed below and is planning surgical intervention. She was counseled on all conservative medical and surgical options as well as definitive procedures as well. Pt is c/o heavy menses with clots with cramping. Pt had an endometrial ablation. Pt has had permanent sterilization. Pt wishes to have definitive surgery due to her symptoms affecting her ADL. Pt with recurrent ovarian cysts. Pt wishes to have her ovaries removed at the time of her surgery. Pt was counseled on risks/ benefits/ alternative options of surgery. Pt wishes to proceed. 1. Menorrhagia with regular cycle    2. Anemia, unspecified type    3. History of endometrial ablation    4. Pelvic pain in female    5.  Cyst of left ovary          Relevant Past History:   Patient Active Problem List    Diagnosis Date Noted    Cyst of left ovary     Pelvic pain in female     Anemia 2013    Other B-complex deficiencies 2013         OB History    Para Term  AB Living   2 2 2 0 0 2   SAB IAB Ectopic Molar Multiple Live Births   0 0 0 0 0 2      # Outcome Date GA Lbr Trey/2nd Weight Sex Delivery Anes PTL Lv   2 Term  42w0d  8 lb (3.629 kg) F Vag-Spont  N AMARILIS   1 Term  42w0d   M Vag-Spont OTHER N AMARILIS       Past Medical History:   Diagnosis Date    Anemia     Anemia     Chronic kidney disease     kidney stones    Endometriosis     Headache     History of blood transfusion     Hyperlipidemia     Obesity     Had Gastric Bypass        Past Surgical History:   Procedure Laterality Date    DILATION AND CURETTAGE  2010    Endometrial Ablation    ENDOMETRIAL ABLATION  2010    GASTRIC BYPASS SURGERY      HERNIA diarrhea  : Denies: dysuria, frequency/urgency, hematuria, pelvic pain  MUSCULOSKELETAL: Denies: back pain, joint swelling, muscle pain  SKIN: Denies: rash, hives. HEMATOLOGIC: Denies Bleeding Disorder, Coagulopathy or Transfusion  NEUROLOGIC: Denies Migraines, Headaches, CVA, TIA  ENDOCRINE: Denies any Endocrinologic disorders                PHYSICAL EXAM:  Blood pressure 118/66, pulse 67, height 5' 4\" (1.626 m), weight 194 lb 12.8 oz (88.4 kg), last menstrual period 08/24/2022, SpO2 100 %, not currently breastfeeding. General Appearance:  awake, alert, oriented, in no acute distress, well developed, well nourished, in no acute distress   Skin:  Skin color, texture, turgor normal. No rashes or lesions  Mouth/Throat:  Mucosa moist.  No lesions. Pharynx without erythema, edema or exudate. Lungs:  Normal expansion. Clear to auscultation. No rales, rhonchi, or wheezing. Heart:  Heart sounds are normal.  Regular rate and rhythm without murmur, gallop or rub. Breast:  xDeclined  Abdomen:  Soft, non-tender, normal bowel sounds. No bruits, organomegaly or masses. Extremities: Extremities warm to touch, pink, with no edema. Musculoskeletal:  negative  Peripheral Pulses:  Normal  Neurologic:  Gait normal. Reflexes normal and symmetric. Sensation grossly intact. Female Urogen:  xDeclined  Female Rectal: xDeclined        Diagnostics:  EXAMINATION:   TRANSABDOMINAL AND TRANSVAGINAL PELVIC ULTRASOUND       7/29/2022       TECHNIQUE:   Transabdominal and transvaginal pelvic ultrasound was performed.        COMPARISON:   12/31/2019 10/03/2019       HISTORY:   ORDERING SYSTEM PROVIDED HISTORY: Menorrhagia with regular cycle   TECHNOLOGIST PROVIDED HISTORY:   Reason for Exam: Menorrhagia with regular cycle, Anemia, unspecified type,   History of endometrial ablation       FINDINGS:       Measurements:       Uterus: 11.1 x 6.4 x 7.4 cm       Endometrial stripe: Approximately 8 mm       Right Ovary:1.5 x 0.9 x 0.6 cm       Left Ovary: 3.7 x 4.7 x 5.1 cm           Ultrasound Findings:       Uterus: Uterus has a somewhat heterogeneous myometrial echotexture. Nabothian cysts in the cervix. Endometrial stripe: Endometrial stripe is within normal limits. Right Ovary: What could represent the right ovary appears somewhat small. Left Ovary:  Left ovary contains a cystic structure with faint low level   internal echoes measuring 3.3 x 3.4 x 3.7 cm. This is likely a hemorrhagic   cyst although endometrioma could have this appearance. There appears to be a   fluid-filled tubular structure adjacent to the ovary suggesting possible   hydrosalpinx. Free Fluid: No evidence of free fluid. Impression   Slightly heterogeneous uterine myometrial echotexture. 3.7 cm complex left ovarian cyst, likely hemorrhagic cyst or endometrioma. Possible hydrosalpinx on the left. Lab Results:  Results for orders placed or performed during the hospital encounter of 08/03/22   SURGICAL PATHOLOGY REPORT   Result Value Ref Range    Surgical Pathology Report       -- Diagnosis --  A.  ENDOCERVIX, BIOPSY/CURETTAGE:  - BENIGN ENDOCERVICAL TISSUE AND MUCUS. B.  CERVIX, POLYPECTOMY:  - BENIGN ENDOCERVICAL POLYP WITH SEVERE ACUTE AND CHRONIC   INFLAMMATION AND FOCAL DYSTROPHIC MICROCALCIFICATION. Mega Arriaga. Sabina Bedoya M.D.  **Electronically Signed Out**         sls/8/5/2022       Clinical Information  Pre-op Diagnosis:  ANEMIA, UNSPECIFIED TYPE, HISTORY OF ENDOMETRIAL  ABLATION, MENORRHAGIA WITH REGULAR CYCLE; CERVICAL POLYP     Operative Findings:  ENDO BX; POLYP (PER CONTAINER)     Source of Specimen  A: ENDO BX  B: CERVICAL POLYP    Gross Description  A. \"ELIZABETH VASQUES, ENDO BX\" Multiple tan-brown tissue fragments and  mucoid material, 2.0 x 1.5 x 0.1 cm in aggregate. Entirely 1cs. B.   \"ELIZABETH VASQUES, POLYP\" Two tan-brown polypoid tissue fragments,  0.4 cm each and are 0.8 x 0.3 x 0.2 cm in aggregate. Entirely 1cs. yr tm      Microscopic Description  A, B. Microscopic examination performed. SURGICAL PATHOLOGY CONSULTATION        Patient Name: Suzanna Zheng [facility] Rec: [id number]  Path Number: [id number]    [address] [place] [address], [place] [address]. [facility] [place], [address]  [phone]  Fax: [phone]             The patient was counseled at length about the risks of estefany Covid-19 in the narciso-operative and post-operative states including the recovery window of their procedure. The patient was made aware that estefany Covid-19 after a surgical procedure may worsen their prognosis for recovering from the virus and lend to a higher morbidity and or mortality risk. The patient was given the options of postponing their procedure. All of the risks, benefits, and alternatives were discussed. The patient  does wish to proceed with the procedure. Assessment:   Diagnosis Orders   1. Menorrhagia with regular cycle        2. Anemia, unspecified type        3. History of endometrial ablation        4. Pelvic pain in female        5. Cyst of left ovary            Patient Active Problem List    Diagnosis Date Noted    Cyst of left ovary     Pelvic pain in female     Anemia 11/27/2013    Other B-complex deficiencies 08/23/2013       PAP: 11/3/2021 with negative HPV  Permanent Sterilization Completed: Yes     Plan:  LESA-BSO          Counseling: The patient was counseled on all options both medical and surgical, conservative as well as definitive. She has elected to proceed with the procedure as stated above. The patient was counseled on the procedure. Risks and complications were reviewed in detail. The patients orders, labs, consents have been completed. The history and physical as well as all supporting surgical documentation will be forwarded to the pre-operative holding area.     The patient is aware that this procedure may not alleviate her symptoms. That there may be a necessity for a second surgery and that there may be an incomplete removal of abnormal tissue. The patients that are undergoing a procedure for family planning WERE INSTRUCTED THAT THE PROCEDURE IS PERMANENT AND NON REVERSIBLE. FAILURE RATES OF 18-20/1000 CASES WERE REVIEWED AS WELL AS ALL ALTERNATE REVERSIBLE FORMS OF BIRTH CONTROL MALE AND FEMALE. THEY WERE COUNSELED THAT A FAILURE WOULD MOST LIKELY END UP IN AN ECTOPIC PREGNANCY, A PREGNANCY OUTSIDE OF THE UTERUS MOST COMMONLY IN THE FALLOPIAN TUBE, NECESSITATING FURTHER SURGERY, A BLOOD TRANSFUSION OR BOTH. POST TUBAL STERILIZATION SYNDROME WAS ALSO DISCUSSED WITH THE PATIENT AT LENGTH.              ________________________________________D. O. Date:_______________  Lowell Manual, DO          The patient, Wander Brooke is a 46 y.o. female, was seen with a total time spent of 20 minutes for the visit on this date of service by the E/M provider. The time component had both face to face and non face to face time spent in determining the total time component. Counseling and education regarding her diagnosis listed below and her options regarding those diagnoses were also included in determining her time component. Diagnosis Orders   1. Menorrhagia with regular cycle        2. Anemia, unspecified type        3. History of endometrial ablation        4. Pelvic pain in female        5. Cyst of left ovary             The patient had her preventative health maintenance recommendations and follow-up reviewed with her at the completion of her visit.

## 2022-09-23 LAB
EKG ATRIAL RATE: 53 BPM
EKG P AXIS: 60 DEGREES
EKG P-R INTERVAL: 192 MS
EKG Q-T INTERVAL: 444 MS
EKG QRS DURATION: 102 MS
EKG QTC CALCULATION (BAZETT): 416 MS
EKG R AXIS: -25 DEGREES
EKG T AXIS: 36 DEGREES
EKG VENTRICULAR RATE: 53 BPM

## 2022-09-26 DIAGNOSIS — R10.2 PELVIC PAIN IN FEMALE: ICD-10-CM

## 2022-09-26 DIAGNOSIS — N83.202 CYST OF LEFT OVARY: Primary | ICD-10-CM

## 2022-10-03 ENCOUNTER — TELEPHONE (OUTPATIENT)
Dept: OBGYN | Age: 52
End: 2022-10-03

## 2022-10-03 NOTE — TELEPHONE ENCOUNTER
[facility]    Pre-Operative Evaluation/Consultation    Name:  Zhang Beard                                         Age:  46 y.o. MRN:  [id number]       :  1970   Date:  10/3/2022         Sex: female    There were no encounter diagnoses. Surgeon:  Dr. Ricki Dsouza  Procedure (Planned): Total Abdominal Hysterectomy, Bilateral Salpingectomy, Oopherectomy, possible Enterocele repair  Date Scheduled surgery: 10/12/2022    Attending : No att. providers found    Primary Physician:None  Cardiologist: None    Type of Anesthesia Requested: General    Patient Medical history:  No Known Allergies  Patient Active Problem List   Diagnosis    Other B-complex deficiencies    Anemia    Cyst of left ovary    Pelvic pain in female     Past Medical History:   Diagnosis Date    Anemia     Anemia     Chronic kidney disease     kidney stones    Endometriosis     Headache     History of blood transfusion     Hyperlipidemia     Obesity     Had Gastric Bypass      Past Surgical History:   Procedure Laterality Date    DILATION AND CURETTAGE  2010    Endometrial Ablation    ENDOMETRIAL ABLATION  2010    GASTRIC BYPASS SURGERY  2003    HERNIA REPAIR      HYSTEROSCOPY  2010    with Endometrial Ablation     LAPAROSCOPY      LITHOTRIPSY Bilateral     OVARIAN CYST REMOVAL Left 10/9/2019    Laparoscopic Left Ovarian Cystectomy performed by Chary Lopez MD at [address] History     Tobacco Use    Smoking status: Never    Smokeless tobacco: Never   Vaping Use    Vaping Use: Never used   Substance Use Topics    Alcohol use: No    Drug use: No     Medications:  Current Outpatient Medications   Medication Sig Dispense Refill    ferrous sulfate (IRON 325) 325 (65 Fe) MG tablet Take 325 mg by mouth in the morning and 325 mg in the evening.       diclofenac sodium (VOLTAREN) 1 % GEL Apply 2 g topically 4 times daily (Patient taking differently: Apply 2 g topically as needed) 50 g 1     No current facility-administered medications for this visit. Scheduled Meds:  Continuous Infusions:  PRN Meds:. Prior to Admission medications    Medication Sig Start Date End Date Taking? Authorizing Provider   ferrous sulfate (IRON 325) 325 (65 Fe) MG tablet Take 325 mg by mouth in the morning and 325 mg in the evening. Historical Provider, MD   diclofenac sodium (VOLTAREN) 1 % GEL Apply 2 g topically 4 times daily  Patient taking differently: Apply 2 g topically as needed 11/17/21   JUS Allen - CNP     Vital Signs (Current) [unfilled]    Weight:   Wt Readings from Last 1 Encounters:   09/22/22 194 lb 12.8 oz (88.4 kg)     Height:   Ht Readings from Last 1 Encounters:   09/22/22 5' 4\" (1.626 m)      BMI:  There is no height or weight on file to calculate BMI. Estimated body mass index is 33.44 kg/m² as calculated from the following:    Height as of 9/22/22: 5' 4\" (1.626 m). Weight as of 9/22/22: 194 lb 12.8 oz (88.4 kg). body mass index is unknown because there is no height or weight on file. Cardiac Clearance: None   Medical Clearance:None   Appointment for surgery Clearance scheduled for:None     Preoperative Testing:   These are the current and completed labs:  CBC:   Lab Results   Component Value Date/Time    WBC 6.4 09/22/2022 01:53 PM    RBC 4.68 09/22/2022 01:53 PM    HGB 11.3 09/22/2022 01:53 PM    HCT 36.3 09/22/2022 01:53 PM    MCV 77.6 09/22/2022 01:53 PM    RDW 19.6 09/22/2022 01:53 PM     09/22/2022 01:53 PM     CMP:   Lab Results   Component Value Date/Time     09/22/2022 01:53 PM    K 4.7 09/22/2022 01:53 PM     09/22/2022 01:53 PM    CO2 26 09/22/2022 01:53 PM    BUN 11 09/22/2022 01:53 PM    CREATININE 0.51 09/22/2022 01:53 PM    GFRAA >60 09/22/2022 01:53 PM    LABGLOM >60 09/22/2022 01:53 PM    GLUCOSE 93 09/22/2022 01:53 PM    PROT 7.3 09/22/2022 01:53 PM    CALCIUM 9.4 09/22/2022 01:53 PM    BILITOT 0.6 09/22/2022 01:53 PM ALKPHOS 109 09/22/2022 01:53 PM    AST 17 09/22/2022 01:53 PM    ALT 10 09/22/2022 01:53 PM     POC Tests: No results for input(s): POCGLU, POCNA, POCK, POCCL, POCBUN, POCHEMO, POCHCT in the last 72 hours.   Coags    Lab Results   Component Value Date/Time    PROTIME 13.0 07/27/2022 01:22 PM    INR 1.0 07/27/2022 01:22 PM    APTT 22.2 07/27/2022 01:22 PM     HCG (If Applicable)   Lab Results   Component Value Date    PREGTESTUR NEGATIVE 08/03/2022    HCGQUANT <1 10/08/2019      ABGs No results found for: PHART, PO2ART, BKS2PLH, WXF8HAG, BEART, A9NPLBWT   Type & Screen (If Applicable)  No results found for: Klaudia Meyer    Additional ordered pre-operative testing:  [x]CBC    []ABG      [] BMP   [x]URINALYSIS   [x]CMP    [x]HCG   []COAGS PT/INR  []T&C  []LFTs   [x]TYPE AND SCREEN    [x] EKG  [x] Chest X-Ray  [] Other Radiology    [] Sent to Hospitalist None  [] Sent to Anesthesia for your review:  CRNA's    [] Additional Orders: None     Comments:None   Requests: None    Signed: Trevin Fermin LPN 54/0/4174 7:44 PM

## 2022-10-05 ENCOUNTER — HOSPITAL ENCOUNTER (OUTPATIENT)
Age: 52
Setting detail: SPECIMEN
Discharge: HOME | End: 2022-10-05
Payer: COMMERCIAL

## 2022-10-05 DIAGNOSIS — Z01.818 PRE-OP TESTING: ICD-10-CM

## 2022-10-05 LAB
BACTERIA: ABNORMAL
BILIRUBIN URINE: NEGATIVE
EPITHELIAL CELLS UA: ABNORMAL /HPF (ref 0–5)
GLUCOSE URINE: NEGATIVE
KETONES, URINE: NEGATIVE
LEUKOCYTE ESTERASE, URINE: NEGATIVE
NITRITE, URINE: NEGATIVE
PH UA: 6.5 (ref 5–6)
PROTEIN UA: NEGATIVE
RBC UA: ABNORMAL /HPF (ref 0–4)
SPECIFIC GRAVITY UA: 1.02 (ref 1.01–1.02)
URINE HGB: NEGATIVE
UROBILINOGEN, URINE: NORMAL
WBC UA: ABNORMAL /HPF (ref 0–4)

## 2022-10-05 PROCEDURE — 87086 URINE CULTURE/COLONY COUNT: CPT

## 2022-10-05 PROCEDURE — 86403 PARTICLE AGGLUT ANTBDY SCRN: CPT

## 2022-10-05 PROCEDURE — 81001 URINALYSIS AUTO W/SCOPE: CPT

## 2022-10-06 LAB
CULTURE: ABNORMAL
SPECIMEN DESCRIPTION: ABNORMAL

## 2022-10-06 RX ORDER — AMPICILLIN 500 MG/1
500 CAPSULE ORAL 4 TIMES DAILY
COMMUNITY
Start: 2022-10-06 | End: 2022-10-15

## 2022-10-10 ENCOUNTER — HOSPITAL ENCOUNTER (OUTPATIENT)
Dept: LAB | Age: 52
Discharge: HOME | End: 2022-10-10
Payer: COMMERCIAL

## 2022-10-10 DIAGNOSIS — Z01.818 PRE-OP TESTING: ICD-10-CM

## 2022-10-10 LAB
ABO/RH: NORMAL
ANTIBODY SCREEN: NEGATIVE
ARM BAND NUMBER: NORMAL
EXPIRATION DATE: NORMAL
HCG QUANTITATIVE: 4 MIU/ML

## 2022-10-10 PROCEDURE — 86901 BLOOD TYPING SEROLOGIC RH(D): CPT

## 2022-10-10 PROCEDURE — 86850 RBC ANTIBODY SCREEN: CPT

## 2022-10-10 PROCEDURE — 84702 CHORIONIC GONADOTROPIN TEST: CPT

## 2022-10-10 PROCEDURE — 86900 BLOOD TYPING SEROLOGIC ABO: CPT

## 2022-10-10 PROCEDURE — 36415 COLL VENOUS BLD VENIPUNCTURE: CPT

## 2022-10-12 ENCOUNTER — ANESTHESIA (OUTPATIENT)
Dept: OPERATING ROOM | Age: 52
DRG: 743 | End: 2022-10-12
Payer: COMMERCIAL

## 2022-10-12 ENCOUNTER — HOSPITAL ENCOUNTER (INPATIENT)
Age: 52
LOS: 2 days | Discharge: HOME | DRG: 743 | End: 2022-10-14
Attending: OBSTETRICS & GYNECOLOGY | Admitting: OBSTETRICS & GYNECOLOGY
Payer: COMMERCIAL

## 2022-10-12 ENCOUNTER — ANESTHESIA EVENT (OUTPATIENT)
Dept: OPERATING ROOM | Age: 52
DRG: 743 | End: 2022-10-12
Payer: COMMERCIAL

## 2022-10-12 DIAGNOSIS — Z90.79 STATUS POST TAH-BSO: ICD-10-CM

## 2022-10-12 DIAGNOSIS — N83.202 CYST OF LEFT OVARY: ICD-10-CM

## 2022-10-12 DIAGNOSIS — R10.2 PELVIC PAIN IN FEMALE: ICD-10-CM

## 2022-10-12 DIAGNOSIS — D64.9 ANEMIA, UNSPECIFIED TYPE: ICD-10-CM

## 2022-10-12 DIAGNOSIS — Z98.890 POSTOPERATIVE STATE: Primary | ICD-10-CM

## 2022-10-12 DIAGNOSIS — Z98.890 HISTORY OF ENDOMETRIAL ABLATION: ICD-10-CM

## 2022-10-12 DIAGNOSIS — N92.0 MENORRHAGIA WITH REGULAR CYCLE: ICD-10-CM

## 2022-10-12 DIAGNOSIS — D50.8 OTHER IRON DEFICIENCY ANEMIA: ICD-10-CM

## 2022-10-12 DIAGNOSIS — Z90.722 STATUS POST TAH-BSO: ICD-10-CM

## 2022-10-12 DIAGNOSIS — Z90.710 STATUS POST TAH-BSO: ICD-10-CM

## 2022-10-12 PROBLEM — N81.5 ACQUIRED PELVIC ENTEROCELE: Status: ACTIVE | Noted: 2022-10-12

## 2022-10-12 PROBLEM — N80.353 ENDOMETRIOSIS OF BOTH PELVIC SIDEWALLS: Status: ACTIVE | Noted: 2022-10-12

## 2022-10-12 LAB
ABSOLUTE EOS #: <0.03 K/UL (ref 0–0.44)
ABSOLUTE IMMATURE GRANULOCYTE: 0.03 K/UL (ref 0–0.3)
ABSOLUTE LYMPH #: 0.77 K/UL (ref 1.1–3.7)
ABSOLUTE MONO #: 0.18 K/UL (ref 0.1–1.2)
ALBUMIN SERPL-MCNC: 3.8 G/DL (ref 3.5–5.2)
ALBUMIN/GLOBULIN RATIO: 1.5 (ref 1–2.5)
ALP BLD-CCNC: 103 U/L (ref 35–104)
ALT SERPL-CCNC: 8 U/L (ref 5–33)
ANION GAP SERPL CALCULATED.3IONS-SCNC: 6 MMOL/L (ref 9–17)
AST SERPL-CCNC: 16 U/L
BASOPHILS # BLD: 0 % (ref 0–2)
BASOPHILS ABSOLUTE: <0.03 K/UL (ref 0–0.2)
BILIRUB SERPL-MCNC: 0.6 MG/DL (ref 0.3–1.2)
BUN BLDV-MCNC: 9 MG/DL (ref 6–20)
BUN/CREAT BLD: 18 (ref 9–20)
CALCIUM SERPL-MCNC: 8.9 MG/DL (ref 8.6–10.4)
CHLORIDE BLD-SCNC: 104 MMOL/L (ref 98–107)
CO2: 29 MMOL/L (ref 20–31)
CREAT SERPL-MCNC: 0.49 MG/DL (ref 0.5–0.9)
EOSINOPHILS RELATIVE PERCENT: 0 % (ref 1–4)
GFR SERPL CREATININE-BSD FRML MDRD: >60 ML/MIN/1.73M2
GLUCOSE BLD-MCNC: 133 MG/DL (ref 70–99)
HCT VFR BLD CALC: 32.3 % (ref 36.3–47.1)
HCT VFR BLD CALC: 34.5 % (ref 36.3–47.1)
HEMOGLOBIN: 10.3 G/DL (ref 11.9–15.1)
HEMOGLOBIN: 11.1 G/DL (ref 11.9–15.1)
IMMATURE GRANULOCYTES: 0 %
LYMPHOCYTES # BLD: 7 % (ref 24–43)
MAGNESIUM: 1.9 MG/DL (ref 1.6–2.6)
MCH RBC QN AUTO: 25.6 PG (ref 25.2–33.5)
MCHC RBC AUTO-ENTMCNC: 32.2 G/DL (ref 25.2–33.5)
MCV RBC AUTO: 79.5 FL (ref 82.6–102.9)
MONOCYTES # BLD: 2 % (ref 3–12)
NRBC AUTOMATED: 0 PER 100 WBC
PDW BLD-RTO: 19.1 % (ref 11.8–14.4)
PLATELET # BLD: 239 K/UL (ref 138–453)
PMV BLD AUTO: 10.1 FL (ref 8.1–13.5)
POTASSIUM SERPL-SCNC: 4.9 MMOL/L (ref 3.7–5.3)
RBC # BLD: 4.34 M/UL (ref 3.95–5.11)
RBC # BLD: ABNORMAL 10*6/UL
SARS-COV-2, RAPID: NOT DETECTED
SEG NEUTROPHILS: 91 % (ref 36–65)
SEGMENTED NEUTROPHILS ABSOLUTE COUNT: 9.76 K/UL (ref 1.5–8.1)
SODIUM BLD-SCNC: 139 MMOL/L (ref 135–144)
SPECIMEN DESCRIPTION: NORMAL
TOTAL PROTEIN: 6.3 G/DL (ref 6.4–8.3)
WBC # BLD: 10.8 K/UL (ref 3.5–11.3)

## 2022-10-12 PROCEDURE — 3700000000 HC ANESTHESIA ATTENDED CARE: Performed by: OBSTETRICS & GYNECOLOGY

## 2022-10-12 PROCEDURE — 80053 COMPREHEN METABOLIC PANEL: CPT

## 2022-10-12 PROCEDURE — 3700000001 HC ADD 15 MINUTES (ANESTHESIA): Performed by: OBSTETRICS & GYNECOLOGY

## 2022-10-12 PROCEDURE — 85025 COMPLETE CBC W/AUTO DIFF WBC: CPT

## 2022-10-12 PROCEDURE — 7100000000 HC PACU RECOVERY - FIRST 15 MIN: Performed by: OBSTETRICS & GYNECOLOGY

## 2022-10-12 PROCEDURE — 2500000003 HC RX 250 WO HCPCS: Performed by: NURSE ANESTHETIST, CERTIFIED REGISTERED

## 2022-10-12 PROCEDURE — 2580000003 HC RX 258: Performed by: OBSTETRICS & GYNECOLOGY

## 2022-10-12 PROCEDURE — 83735 ASSAY OF MAGNESIUM: CPT

## 2022-10-12 PROCEDURE — 0UQF0ZZ REPAIR CUL-DE-SAC, OPEN APPROACH: ICD-10-PCS | Performed by: OBSTETRICS & GYNECOLOGY

## 2022-10-12 PROCEDURE — 0UT90ZZ RESECTION OF UTERUS, OPEN APPROACH: ICD-10-PCS | Performed by: OBSTETRICS & GYNECOLOGY

## 2022-10-12 PROCEDURE — 3600000014 HC SURGERY LEVEL 4 ADDTL 15MIN: Performed by: OBSTETRICS & GYNECOLOGY

## 2022-10-12 PROCEDURE — 58150 TOTAL HYSTERECTOMY: CPT | Performed by: OBSTETRICS & GYNECOLOGY

## 2022-10-12 PROCEDURE — 99222 1ST HOSP IP/OBS MODERATE 55: CPT | Performed by: INTERNAL MEDICINE

## 2022-10-12 PROCEDURE — G0378 HOSPITAL OBSERVATION PER HR: HCPCS

## 2022-10-12 PROCEDURE — 57270 REPAIR OF BOWEL POUCH: CPT | Performed by: OBSTETRICS & GYNECOLOGY

## 2022-10-12 PROCEDURE — 0UT20ZZ RESECTION OF BILATERAL OVARIES, OPEN APPROACH: ICD-10-PCS | Performed by: OBSTETRICS & GYNECOLOGY

## 2022-10-12 PROCEDURE — 88307 TISSUE EXAM BY PATHOLOGIST: CPT

## 2022-10-12 PROCEDURE — 2709999900 HC NON-CHARGEABLE SUPPLY: Performed by: OBSTETRICS & GYNECOLOGY

## 2022-10-12 PROCEDURE — 0DNP0ZZ RELEASE RECTUM, OPEN APPROACH: ICD-10-PCS | Performed by: OBSTETRICS & GYNECOLOGY

## 2022-10-12 PROCEDURE — 3600000004 HC SURGERY LEVEL 4 BASE: Performed by: OBSTETRICS & GYNECOLOGY

## 2022-10-12 PROCEDURE — 87635 SARS-COV-2 COVID-19 AMP PRB: CPT

## 2022-10-12 PROCEDURE — 85014 HEMATOCRIT: CPT

## 2022-10-12 PROCEDURE — 6360000002 HC RX W HCPCS: Performed by: NURSE ANESTHETIST, CERTIFIED REGISTERED

## 2022-10-12 PROCEDURE — 85018 HEMOGLOBIN: CPT

## 2022-10-12 PROCEDURE — 7100000001 HC PACU RECOVERY - ADDTL 15 MIN: Performed by: OBSTETRICS & GYNECOLOGY

## 2022-10-12 PROCEDURE — 2720000010 HC SURG SUPPLY STERILE: Performed by: OBSTETRICS & GYNECOLOGY

## 2022-10-12 PROCEDURE — 6370000000 HC RX 637 (ALT 250 FOR IP): Performed by: OBSTETRICS & GYNECOLOGY

## 2022-10-12 PROCEDURE — 6360000002 HC RX W HCPCS: Performed by: OBSTETRICS & GYNECOLOGY

## 2022-10-12 PROCEDURE — 0UT70ZZ RESECTION OF BILATERAL FALLOPIAN TUBES, OPEN APPROACH: ICD-10-PCS | Performed by: OBSTETRICS & GYNECOLOGY

## 2022-10-12 PROCEDURE — 36415 COLL VENOUS BLD VENIPUNCTURE: CPT

## 2022-10-12 PROCEDURE — 2060000000 HC ICU INTERMEDIATE R&B

## 2022-10-12 RX ORDER — PROPOFOL 10 MG/ML
INJECTION, EMULSION INTRAVENOUS PRN
Status: DISCONTINUED | OUTPATIENT
Start: 2022-10-12 | End: 2022-10-12 | Stop reason: SDUPTHER

## 2022-10-12 RX ORDER — OXYCODONE HYDROCHLORIDE 5 MG/1
10 TABLET ORAL EVERY 4 HOURS PRN
Status: DISCONTINUED | OUTPATIENT
Start: 2022-10-12 | End: 2022-10-14 | Stop reason: HOSPADM

## 2022-10-12 RX ORDER — MORPHINE SULFATE 1 MG/ML
INJECTION, SOLUTION EPIDURAL; INTRATHECAL; INTRAVENOUS
Status: COMPLETED | OUTPATIENT
Start: 2022-10-12 | End: 2022-10-12

## 2022-10-12 RX ORDER — LIDOCAINE HYDROCHLORIDE 20 MG/ML
INJECTION, SOLUTION EPIDURAL; INFILTRATION; INTRACAUDAL; PERINEURAL PRN
Status: DISCONTINUED | OUTPATIENT
Start: 2022-10-12 | End: 2022-10-12 | Stop reason: SDUPTHER

## 2022-10-12 RX ORDER — ENOXAPARIN SODIUM 100 MG/ML
40 INJECTION SUBCUTANEOUS DAILY
Status: DISCONTINUED | OUTPATIENT
Start: 2022-10-12 | End: 2022-10-14 | Stop reason: HOSPADM

## 2022-10-12 RX ORDER — ROCURONIUM BROMIDE 10 MG/ML
INJECTION, SOLUTION INTRAVENOUS PRN
Status: DISCONTINUED | OUTPATIENT
Start: 2022-10-12 | End: 2022-10-12 | Stop reason: SDUPTHER

## 2022-10-12 RX ORDER — OXYCODONE HYDROCHLORIDE 5 MG/1
5 TABLET ORAL EVERY 4 HOURS PRN
Status: DISCONTINUED | OUTPATIENT
Start: 2022-10-12 | End: 2022-10-14 | Stop reason: HOSPADM

## 2022-10-12 RX ORDER — SODIUM CHLORIDE 0.9 % (FLUSH) 0.9 %
5-40 SYRINGE (ML) INJECTION EVERY 12 HOURS SCHEDULED
Status: DISCONTINUED | OUTPATIENT
Start: 2022-10-12 | End: 2022-10-14 | Stop reason: HOSPADM

## 2022-10-12 RX ORDER — DIPHENHYDRAMINE HYDROCHLORIDE 50 MG/ML
INJECTION INTRAMUSCULAR; INTRAVENOUS PRN
Status: DISCONTINUED | OUTPATIENT
Start: 2022-10-12 | End: 2022-10-12 | Stop reason: SDUPTHER

## 2022-10-12 RX ORDER — KETOROLAC TROMETHAMINE 30 MG/ML
INJECTION, SOLUTION INTRAMUSCULAR; INTRAVENOUS PRN
Status: DISCONTINUED | OUTPATIENT
Start: 2022-10-12 | End: 2022-10-12 | Stop reason: SDUPTHER

## 2022-10-12 RX ORDER — KETOROLAC TROMETHAMINE 30 MG/ML
30 INJECTION, SOLUTION INTRAMUSCULAR; INTRAVENOUS EVERY 6 HOURS
Status: COMPLETED | OUTPATIENT
Start: 2022-10-12 | End: 2022-10-13

## 2022-10-12 RX ORDER — PHENYLEPHRINE HYDROCHLORIDE 10 MG/ML
INJECTION INTRAVENOUS PRN
Status: DISCONTINUED | OUTPATIENT
Start: 2022-10-12 | End: 2022-10-12 | Stop reason: SDUPTHER

## 2022-10-12 RX ORDER — FENTANYL CITRATE 50 UG/ML
INJECTION, SOLUTION INTRAMUSCULAR; INTRAVENOUS PRN
Status: DISCONTINUED | OUTPATIENT
Start: 2022-10-12 | End: 2022-10-12 | Stop reason: SDUPTHER

## 2022-10-12 RX ORDER — DOCUSATE SODIUM 100 MG/1
100 CAPSULE, LIQUID FILLED ORAL 2 TIMES DAILY
Status: DISCONTINUED | OUTPATIENT
Start: 2022-10-12 | End: 2022-10-14 | Stop reason: HOSPADM

## 2022-10-12 RX ORDER — ALBUTEROL SULFATE 2.5 MG/3ML
2.5 SOLUTION RESPIRATORY (INHALATION)
Status: DISCONTINUED | OUTPATIENT
Start: 2022-10-12 | End: 2022-10-14 | Stop reason: HOSPADM

## 2022-10-12 RX ORDER — ONDANSETRON 2 MG/ML
INJECTION INTRAMUSCULAR; INTRAVENOUS PRN
Status: DISCONTINUED | OUTPATIENT
Start: 2022-10-12 | End: 2022-10-12 | Stop reason: SDUPTHER

## 2022-10-12 RX ORDER — SODIUM CHLORIDE 9 MG/ML
INJECTION, SOLUTION INTRAVENOUS CONTINUOUS
Status: DISCONTINUED | OUTPATIENT
Start: 2022-10-12 | End: 2022-10-13

## 2022-10-12 RX ORDER — SCOLOPAMINE TRANSDERMAL SYSTEM 1 MG/1
1 PATCH, EXTENDED RELEASE TRANSDERMAL ONCE
Status: DISCONTINUED | OUTPATIENT
Start: 2022-10-12 | End: 2022-10-14 | Stop reason: HOSPADM

## 2022-10-12 RX ORDER — OXYCODONE HYDROCHLORIDE AND ACETAMINOPHEN 5; 325 MG/1; MG/1
2 TABLET ORAL EVERY 6 HOURS PRN
Qty: 30 TABLET | Refills: 0 | Status: SHIPPED | OUTPATIENT
Start: 2022-10-12 | End: 2022-10-17

## 2022-10-12 RX ORDER — SODIUM CHLORIDE, SODIUM LACTATE, POTASSIUM CHLORIDE, CALCIUM CHLORIDE 600; 310; 30; 20 MG/100ML; MG/100ML; MG/100ML; MG/100ML
INJECTION, SOLUTION INTRAVENOUS CONTINUOUS
Status: DISCONTINUED | OUTPATIENT
Start: 2022-10-12 | End: 2022-10-12

## 2022-10-12 RX ORDER — BUPIVACAINE HYDROCHLORIDE 7.5 MG/ML
INJECTION, SOLUTION EPIDURAL; RETROBULBAR
Status: COMPLETED | OUTPATIENT
Start: 2022-10-12 | End: 2022-10-12

## 2022-10-12 RX ORDER — SODIUM CHLORIDE 0.9 % (FLUSH) 0.9 %
5-40 SYRINGE (ML) INJECTION PRN
Status: DISCONTINUED | OUTPATIENT
Start: 2022-10-12 | End: 2022-10-14 | Stop reason: HOSPADM

## 2022-10-12 RX ORDER — IPRATROPIUM BROMIDE AND ALBUTEROL SULFATE 2.5; .5 MG/3ML; MG/3ML
1 SOLUTION RESPIRATORY (INHALATION)
Status: DISCONTINUED | OUTPATIENT
Start: 2022-10-12 | End: 2022-10-12

## 2022-10-12 RX ORDER — GLYCOPYRROLATE 1 MG/5 ML
SYRINGE (ML) INTRAVENOUS PRN
Status: DISCONTINUED | OUTPATIENT
Start: 2022-10-12 | End: 2022-10-12 | Stop reason: SDUPTHER

## 2022-10-12 RX ORDER — SODIUM CHLORIDE 9 MG/ML
INJECTION, SOLUTION INTRAVENOUS PRN
Status: DISCONTINUED | OUTPATIENT
Start: 2022-10-12 | End: 2022-10-12 | Stop reason: HOSPADM

## 2022-10-12 RX ORDER — SODIUM CHLORIDE 0.9 % (FLUSH) 0.9 %
5-40 SYRINGE (ML) INJECTION PRN
Status: DISCONTINUED | OUTPATIENT
Start: 2022-10-12 | End: 2022-10-12 | Stop reason: HOSPADM

## 2022-10-12 RX ORDER — SODIUM CHLORIDE FOR INHALATION 0.9 %
3 VIAL, NEBULIZER (ML) INHALATION
Status: DISCONTINUED | OUTPATIENT
Start: 2022-10-12 | End: 2022-10-14 | Stop reason: HOSPADM

## 2022-10-12 RX ORDER — ONDANSETRON 2 MG/ML
4 INJECTION INTRAMUSCULAR; INTRAVENOUS EVERY 6 HOURS PRN
Status: DISCONTINUED | OUTPATIENT
Start: 2022-10-12 | End: 2022-10-14 | Stop reason: HOSPADM

## 2022-10-12 RX ORDER — ONDANSETRON 4 MG/1
4 TABLET, ORALLY DISINTEGRATING ORAL EVERY 8 HOURS PRN
Status: DISCONTINUED | OUTPATIENT
Start: 2022-10-12 | End: 2022-10-14 | Stop reason: HOSPADM

## 2022-10-12 RX ORDER — DEXAMETHASONE SODIUM PHOSPHATE 10 MG/ML
INJECTION INTRAMUSCULAR; INTRAVENOUS PRN
Status: DISCONTINUED | OUTPATIENT
Start: 2022-10-12 | End: 2022-10-12 | Stop reason: SDUPTHER

## 2022-10-12 RX ORDER — NEOSTIGMINE METHYLSULFATE 1 MG/ML
INJECTION, SOLUTION INTRAVENOUS PRN
Status: DISCONTINUED | OUTPATIENT
Start: 2022-10-12 | End: 2022-10-12 | Stop reason: SDUPTHER

## 2022-10-12 RX ORDER — SODIUM CHLORIDE 0.9 % (FLUSH) 0.9 %
5-40 SYRINGE (ML) INJECTION EVERY 12 HOURS SCHEDULED
Status: DISCONTINUED | OUTPATIENT
Start: 2022-10-12 | End: 2022-10-12 | Stop reason: HOSPADM

## 2022-10-12 RX ORDER — SODIUM CHLORIDE 9 MG/ML
INJECTION, SOLUTION INTRAVENOUS PRN
Status: DISCONTINUED | OUTPATIENT
Start: 2022-10-12 | End: 2022-10-14 | Stop reason: HOSPADM

## 2022-10-12 RX ORDER — MIDAZOLAM HYDROCHLORIDE 1 MG/ML
INJECTION INTRAMUSCULAR; INTRAVENOUS PRN
Status: DISCONTINUED | OUTPATIENT
Start: 2022-10-12 | End: 2022-10-12 | Stop reason: SDUPTHER

## 2022-10-12 RX ADMIN — SODIUM CHLORIDE: 9 INJECTION, SOLUTION INTRAVENOUS at 14:10

## 2022-10-12 RX ADMIN — PHENYLEPHRINE HYDROCHLORIDE 100 MCG: 10 INJECTION INTRAVENOUS at 09:46

## 2022-10-12 RX ADMIN — BUPIVACAINE HYDROCHLORIDE 11.25 MG: 7.5 INJECTION, SOLUTION EPIDURAL; RETROBULBAR at 09:15

## 2022-10-12 RX ADMIN — Medication 2000 MG: at 09:31

## 2022-10-12 RX ADMIN — SODIUM CHLORIDE, POTASSIUM CHLORIDE, SODIUM LACTATE AND CALCIUM CHLORIDE: 600; 310; 30; 20 INJECTION, SOLUTION INTRAVENOUS at 09:15

## 2022-10-12 RX ADMIN — FENTANYL CITRATE 50 MCG: 50 INJECTION, SOLUTION INTRAMUSCULAR; INTRAVENOUS at 09:10

## 2022-10-12 RX ADMIN — MIDAZOLAM HYDROCHLORIDE 1 MG: 1 INJECTION, SOLUTION INTRAMUSCULAR; INTRAVENOUS at 09:10

## 2022-10-12 RX ADMIN — SODIUM CHLORIDE, POTASSIUM CHLORIDE, SODIUM LACTATE AND CALCIUM CHLORIDE: 600; 310; 30; 20 INJECTION, SOLUTION INTRAVENOUS at 10:49

## 2022-10-12 RX ADMIN — DOCUSATE SODIUM 100 MG: 100 CAPSULE, LIQUID FILLED ORAL at 20:53

## 2022-10-12 RX ADMIN — PROPOFOL 180 MG: 10 INJECTION, EMULSION INTRAVENOUS at 09:22

## 2022-10-12 RX ADMIN — ONDANSETRON 4 MG: 2 INJECTION INTRAMUSCULAR; INTRAVENOUS at 09:35

## 2022-10-12 RX ADMIN — PHENYLEPHRINE HYDROCHLORIDE 100 MCG: 10 INJECTION INTRAVENOUS at 10:13

## 2022-10-12 RX ADMIN — SODIUM CHLORIDE, POTASSIUM CHLORIDE, SODIUM LACTATE AND CALCIUM CHLORIDE: 600; 310; 30; 20 INJECTION, SOLUTION INTRAVENOUS at 08:48

## 2022-10-12 RX ADMIN — PHENYLEPHRINE HYDROCHLORIDE 100 MCG: 10 INJECTION INTRAVENOUS at 10:40

## 2022-10-12 RX ADMIN — PHENYLEPHRINE HYDROCHLORIDE 100 MCG: 10 INJECTION INTRAVENOUS at 10:34

## 2022-10-12 RX ADMIN — NEOSTIGMINE METHYLSULFATE 3 MG: 1 INJECTION INTRAVENOUS at 11:03

## 2022-10-12 RX ADMIN — MORPHINE SULFATE 2 MG: 1 INJECTION, SOLUTION EPIDURAL; INTRATHECAL; INTRAVENOUS at 10:04

## 2022-10-12 RX ADMIN — Medication 0.6 MG: at 11:03

## 2022-10-12 RX ADMIN — ROCURONIUM BROMIDE 20 MG: 10 INJECTION, SOLUTION INTRAVENOUS at 09:33

## 2022-10-12 RX ADMIN — DIPHENHYDRAMINE HYDROCHLORIDE 25 MG: 50 INJECTION, SOLUTION INTRAMUSCULAR; INTRAVENOUS at 09:35

## 2022-10-12 RX ADMIN — Medication 2000 MG: at 17:38

## 2022-10-12 RX ADMIN — KETOROLAC TROMETHAMINE 30 MG: 30 INJECTION, SOLUTION INTRAMUSCULAR at 23:05

## 2022-10-12 RX ADMIN — LIDOCAINE HYDROCHLORIDE 100 MG: 20 INJECTION, SOLUTION EPIDURAL; INFILTRATION; INTRACAUDAL; PERINEURAL at 09:22

## 2022-10-12 RX ADMIN — DOCUSATE SODIUM 100 MG: 100 CAPSULE, LIQUID FILLED ORAL at 16:31

## 2022-10-12 RX ADMIN — MIDAZOLAM HYDROCHLORIDE 1 MG: 1 INJECTION, SOLUTION INTRAMUSCULAR; INTRAVENOUS at 09:08

## 2022-10-12 RX ADMIN — MORPHINE SULFATE 1 MG: 1 INJECTION, SOLUTION EPIDURAL; INTRATHECAL; INTRAVENOUS at 10:42

## 2022-10-12 RX ADMIN — PHENYLEPHRINE HYDROCHLORIDE 100 MCG: 10 INJECTION INTRAVENOUS at 10:09

## 2022-10-12 RX ADMIN — PROPOFOL 50 MG: 10 INJECTION, EMULSION INTRAVENOUS at 11:13

## 2022-10-12 RX ADMIN — MORPHINE SULFATE 2 MG: 1 INJECTION, SOLUTION EPIDURAL; INTRATHECAL; INTRAVENOUS at 10:29

## 2022-10-12 RX ADMIN — MORPHINE SULFATE 1.7 MG: 1 INJECTION, SOLUTION EPIDURAL; INTRATHECAL; INTRAVENOUS at 09:54

## 2022-10-12 RX ADMIN — KETOROLAC TROMETHAMINE 30 MG: 30 INJECTION, SOLUTION INTRAMUSCULAR at 10:39

## 2022-10-12 RX ADMIN — ONDANSETRON 4 MG: 2 INJECTION INTRAMUSCULAR; INTRAVENOUS at 17:44

## 2022-10-12 RX ADMIN — ROCURONIUM BROMIDE 30 MG: 10 INJECTION, SOLUTION INTRAVENOUS at 09:22

## 2022-10-12 RX ADMIN — DEXAMETHASONE SODIUM PHOSPHATE 10 MG: 10 INJECTION INTRAMUSCULAR; INTRAVENOUS at 09:35

## 2022-10-12 RX ADMIN — ENOXAPARIN SODIUM 40 MG: 100 INJECTION SUBCUTANEOUS at 20:53

## 2022-10-12 RX ADMIN — KETOROLAC TROMETHAMINE 30 MG: 30 INJECTION, SOLUTION INTRAMUSCULAR at 16:31

## 2022-10-12 RX ADMIN — PHENYLEPHRINE HYDROCHLORIDE 100 MCG: 10 INJECTION INTRAVENOUS at 09:52

## 2022-10-12 RX ADMIN — MORPHINE SULFATE 0.3 MG: 1 INJECTION, SOLUTION EPIDURAL; INTRATHECAL; INTRAVENOUS at 09:12

## 2022-10-12 RX ADMIN — SODIUM CHLORIDE: 9 INJECTION, SOLUTION INTRAVENOUS at 21:45

## 2022-10-12 RX ADMIN — MORPHINE SULFATE 1 MG: 1 INJECTION, SOLUTION EPIDURAL; INTRATHECAL; INTRAVENOUS at 11:08

## 2022-10-12 RX ADMIN — FENTANYL CITRATE 50 MCG: 50 INJECTION, SOLUTION INTRAMUSCULAR; INTRAVENOUS at 09:08

## 2022-10-12 RX ADMIN — SODIUM CHLORIDE, POTASSIUM CHLORIDE, SODIUM LACTATE AND CALCIUM CHLORIDE: 600; 310; 30; 20 INJECTION, SOLUTION INTRAVENOUS at 09:37

## 2022-10-12 RX ADMIN — PHENYLEPHRINE HYDROCHLORIDE 100 MCG: 10 INJECTION INTRAVENOUS at 09:37

## 2022-10-12 RX ADMIN — MORPHINE SULFATE 2 MG: 1 INJECTION, SOLUTION EPIDURAL; INTRATHECAL; INTRAVENOUS at 10:18

## 2022-10-12 ASSESSMENT — PAIN SCALES - GENERAL
PAINLEVEL_OUTOF10: 0

## 2022-10-12 ASSESSMENT — LIFESTYLE VARIABLES: HOW OFTEN DO YOU HAVE A DRINK CONTAINING ALCOHOL: NEVER

## 2022-10-12 ASSESSMENT — PAIN - FUNCTIONAL ASSESSMENT: PAIN_FUNCTIONAL_ASSESSMENT: 0-10

## 2022-10-12 NOTE — ANESTHESIA PRE PROCEDURE
Department of Anesthesiology  Preprocedure Note       Name:  Radha Moe   Age:  46 y.o.  :  1970                                          MRN:  [id number]         Date:  10/12/2022      Surgeon: Stan Taylor):  DO Lakshmi Boone DO    Procedure: Total Abdominal Hysterectomy Bilateral Salpingoopherectomy possible enterocele repair - 100 (Abdomen)    Medications prior to admission:   Prior to Admission medications    Medication Sig Start Date End Date Taking? Authorizing Provider   ampicillin (PRINCIPEN) 500 MG capsule Take 500 mg by mouth 4 times daily 10/6/22 10/15/22  Historical Provider, MD   ferrous sulfate (IRON 325) 325 (65 Fe) MG tablet Take 325 mg by mouth in the morning and 325 mg in the evening. Historical Provider, MD   diclofenac sodium (VOLTAREN) 1 % GEL Apply 2 g topically 4 times daily  Patient taking differently: Apply 2 g topically as needed 21   JUS Esparza - CNP       Current medications:    No current outpatient medications on file. No current facility-administered medications for this visit.        Allergies:  No Known Allergies    Problem List:    Patient Active Problem List   Diagnosis Code    Other B-complex deficiencies E53.8    Anemia D64.9    Cyst of left ovary N83.202    Pelvic pain in female R10.2       Past Medical History:        Diagnosis Date    Anemia     Anemia     Chronic kidney disease     kidney stones    Endometriosis     Headache     History of blood transfusion     Hyperlipidemia     Obesity     Had Gastric Bypass        Past Surgical History:        Procedure Laterality Date    APPENDECTOMY      CHOLECYSTECTOMY      DILATION AND CURETTAGE  2010    Endometrial Ablation    ENDOMETRIAL ABLATION  2010    GASTRIC BYPASS SURGERY     Jose Bal HERNIA REPAIR      HYSTEROSCOPY  2010    with Endometrial Ablation     LAPAROSCOPY      LITHOTRIPSY Bilateral     OVARIAN CYST REMOVAL Left 10/09/2019    Laparoscopic Left Ovarian Cystectomy performed by Wanda Freeman MD at [facility] [address] EXTRACTION         Social History:    Social History     Tobacco Use    Smoking status: Never    Smokeless tobacco: Never   Substance Use Topics    Alcohol use: No                                Counseling given: Not Answered      Vital Signs (Current): There were no vitals filed for this visit. BP Readings from Last 3 Encounters:   09/22/22 118/66   08/03/22 122/80   07/27/22 118/78       NPO Status:                                                                                 BMI:   Wt Readings from Last 3 Encounters:   09/22/22 194 lb 12.8 oz (88.4 kg)   07/27/22 200 lb (90.7 kg)   12/10/21 200 lb 3.2 oz (90.8 kg)     There is no height or weight on file to calculate BMI.    CBC:   Lab Results   Component Value Date/Time    WBC 6.4 09/22/2022 01:53 PM    RBC 4.68 09/22/2022 01:53 PM    HGB 11.3 09/22/2022 01:53 PM    HCT 36.3 09/22/2022 01:53 PM    MCV 77.6 09/22/2022 01:53 PM    RDW 19.6 09/22/2022 01:53 PM     09/22/2022 01:53 PM       CMP:   Lab Results   Component Value Date/Time     09/22/2022 01:53 PM    K 4.7 09/22/2022 01:53 PM     09/22/2022 01:53 PM    CO2 26 09/22/2022 01:53 PM    BUN 11 09/22/2022 01:53 PM    CREATININE 0.51 09/22/2022 01:53 PM    GFRAA >60 09/22/2022 01:53 PM    LABGLOM >60 09/22/2022 01:53 PM    GLUCOSE 93 09/22/2022 01:53 PM    PROT 7.3 09/22/2022 01:53 PM    CALCIUM 9.4 09/22/2022 01:53 PM    BILITOT 0.6 09/22/2022 01:53 PM    ALKPHOS 109 09/22/2022 01:53 PM    AST 17 09/22/2022 01:53 PM    ALT 10 09/22/2022 01:53 PM       POC Tests: No results for input(s): POCGLU, POCNA, POCK, POCCL, POCBUN, POCHEMO, POCHCT in the last 72 hours.     Coags:   Lab Results   Component Value Date/Time    PROTIME 13.0 07/27/2022 01:22 PM    INR 1.0 07/27/2022 01:22 PM    APTT 22.2 07/27/2022 01:22 PM       HCG (If Applicable): Lab Results   Component Value Date    PREGTESTUR NEGATIVE 08/03/2022    HCGQUANT 4 10/10/2022        ABGs: No results found for: PHART, PO2ART, IYY0QYK, BDY0UDU, BEART, X7TTDIZK     Type & Screen (If Applicable):  No results found for: LABABO, [address]    Anesthesia Evaluation  Patient summary reviewed and Nursing notes reviewed no history of anesthetic complications:   Airway: Mallampati: II  TM distance: >3 FB   Neck ROM: full  Mouth opening: > = 3 FB   Dental: normal exam         Pulmonary:Negative Pulmonary ROS breath sounds clear to auscultation                             Cardiovascular:  Exercise tolerance: good (>4 METS),           Rhythm: regular  Rate: normal           Beta Blocker:  Not on Beta Blocker         Neuro/Psych:   Negative Neuro/Psych ROS              GI/Hepatic/Renal: Neg GI/Hepatic/Renal ROS            Endo/Other: Negative Endo/Other ROS                    Abdominal:             Vascular: negative vascular ROS. Other Findings:             Anesthesia Plan      general and spinal     ASA 2       Induction: intravenous. MIPS: Postoperative opioids intended and Prophylactic antiemetics administered. Anesthetic plan and risks discussed with patient. Use of blood products discussed with patient whom consented to blood products.    Plan discussed with surgical team.          Post-op pain plan if not by surgeon: intrathecal narcotics            Wei Grace APRN - CRNA   10/12/2022

## 2022-10-12 NOTE — ANESTHESIA PROCEDURE NOTES
Spinal Block    Patient location during procedure: OR  End time: 10/12/2022 9:18 AM  Reason for block: post-op pain management and at surgeon's request  Staffing  Anesthesiologist: JUS Hensley CRNA  Spinal Block  Patient position: sitting  Prep: Betadine  Patient monitoring: frequent blood pressure checks and continuous pulse ox  Approach: midline  Location: L3/L4  Provider prep: sterile gloves and mask  Local infiltration: lidocaine  Needle  Needle type: Pencan   Needle gauge: 25 G  Needle length: 3.5 in  Needle insertion depth: 3.5 cm  Kit: [id number]  Expiration date: 2/28/2023  Assessment  Sensory level: T4  Events: cerebrospinal fluid  Swirl obtained: Yes  CSF: clear  Attempts: 1  Hemodynamics: stable  Additional Notes  Sitting Spinal without incident  Preanesthetic Checklist  Completed: patient identified, IV checked, site marked, risks and benefits discussed, surgical/procedural consents, equipment checked, pre-op evaluation, timeout performed, anesthesia consent given, oxygen available, monitors applied/VS acknowledged, fire risk safety assessment completed and verbalized and blood product R/B/A discussed and consented

## 2022-10-12 NOTE — PLAN OF CARE
Problem: Discharge Planning  Goal: Discharge to home or other facility with appropriate resources  Outcome: Progressing  Flowsheets (Taken 10/12/2022 1815)  Discharge to home or other facility with appropriate resources:   Identify barriers to discharge with patient and caregiver   Arrange for needed discharge resources and transportation as appropriate   Identify discharge learning needs (meds, wound care, etc)   Refer to discharge planning if patient needs post-hospital services based on physician order or complex needs related to functional status, cognitive ability or social support system  Note: Plan for discharge to home when appropriate     Problem: Pain  Goal: Verbalizes/displays adequate comfort level or baseline comfort level  Outcome: Progressing  Flowsheets (Taken 10/12/2022 1815)  Verbalizes/displays adequate comfort level or baseline comfort level:   Encourage patient to monitor pain and request assistance   Assess pain using appropriate pain scale   Administer analgesics based on type and severity of pain and evaluate response   Implement non-pharmacological measures as appropriate and evaluate response  Note: No c/o pain at this time     Problem: Neurosensory - Adult  Goal: Achieves maximal functionality and self care  Outcome: Progressing  Flowsheets (Taken 10/12/2022 1815)  Achieves maximal functionality and self care:   Monitor swallowing and airway patency with patient fatigue and changes in neurological status   Encourage and assist patient to increase activity and self care with guidance from physical therapy/occupational therapy  Note: No c/o numbness or tingling at this time     Problem: Skin/Tissue Integrity - Adult  Goal: Incisions, wounds, or drain sites healing without S/S of infection  Outcome: Progressing  Flowsheets (Taken 10/12/2022 1815)  Incisions, Wounds, or Drain Sites Healing Without Sign and Symptoms of Infection:   ADMISSION and DAILY: Assess and document risk factors for pressure ulcer development   TWICE DAILY: Assess and document skin integrity   TWICE DAILY: Assess and document dressing/incision, wound bed, drain sites and surrounding tissue  Note: Lower ABD incision with dressing with small amounts of bloody drainage     Problem: Gastrointestinal - Adult  Goal: Minimal or absence of nausea and vomiting  Outcome: Progressing  Flowsheets (Taken 10/12/2022 1815)  Minimal or absence of nausea and vomiting:   Administer ordered antiemetic medications as needed   Advance diet as tolerated, if ordered   Administer IV fluids as ordered to ensure adequate hydration  Note: Active bowel sounds at this time; states not passing gas at this time  Goal: Maintains or returns to baseline bowel function  Outcome: Progressing     Problem: Hematologic - Adult  Goal: Maintains hematologic stability  Outcome: Progressing  Note: Labs ordered for tomorrow morning

## 2022-10-12 NOTE — PROGRESS NOTES
charles arboleda on PCU. Assessment:  Patient calm and engaged well in conversation. Patient had visitors at time of my visit. Intervention:   provided a listening and supportive presence. Outcome:  Patient expressed gratitude for visit    Plan:  Chaplains will remain available to offer spiritual and emotional support as needed.        10/12/22 1607   Encounter Summary   Encounter Overview/Reason  Initial Encounter   Service Provided For: Patient and family together   Referral/Consult From: [facility] Family members   Last Encounter  10/12/22   Complexity of Encounter Low   Begin Time 1600   End Time  1605   Total Time Calculated 5 min   Encounter    Type Initial Screen/Assessment   Spiritual/Emotional needs   Type Spiritual Support   Assessment/Intervention/Outcome   Assessment Calm   Intervention Active listening   Outcome Expressed Gratitude;Engaged in conversation     Electronically signed by Francisca Cunningham on 10/12/2022 at 4:13 PM

## 2022-10-12 NOTE — OP NOTE
Gynecologic Operative Note:      NAME: Deonte Robison   MRN: [id number]  CSN: [id number]  : 1970      PROCEDURE DATE: 10/12/2022     Pre-op Diagnosis: Anemia, unspecified type [D64.9]  History of endometrial ablation [Z98.890]  Menorrhagia with regular cycle [N92.0]  Cyst of left ovary [N83.202]  Pelvic pain in female [R10.2]     Post-op Diagnosis: Same; Pelvic endometriosis stage 4; left hydrosalpinx; left endometrioma; extensive pelvic adhesions; enlarged uterus    Procedure: Procedure(s):  Total Abdominal Hysterectomy Bilateral Salpingoopherectomy & Oopherectomy, enterocele repair, & lysis of adhesions    Surgeon: Suzy Zhao DO  Consulting surgeon: Dr. Radha Earl (general surgery) see separate dictation  Assistant:   Olivia Pickard D.O. (See below for indication for surgical assistant)    Circulator Documentation of Staff:  Surgeon(s) and Role:     * Suzy Zhao DO - Primary     * Oly Sheriff MD - Assisting     * Maria C Guillory DO - Assisting    OR Staff:  Scrub Person First: Cipriano Sears      Anesthesia Type: General  Anesthesia Staff: CRNA: JUS Vasquez - CRNA      Complications: None      Estimated Blood Loss: 100 ml  Total IV Fluids:  2200 ml  Urine Output: 600 ml clear      Specimens:   ID Type Source Tests Collected by Time Destination   A : Right overy and tube Tissue Ovary [address], DO 10/12/2022 1001    B : Left overy, tube, and endometrioma Tissue Ovary SURGICAL PATHOLOGY Suzy Zhao DO 10/12/2022 1016    C : Uterus and cervix Tissue Uterus SURGICAL PATHOLOGY Suzy Zhao DO 10/12/2022 1030      Implants: * No implants in log *    Drains:   Urinary Catheter 10/12/22 Gant (Active)         Condition: Stable    Findings: The patient's intra-abdominal cavity was explored. The liver edge was smooth without studding. Both renal beds were without masses.  There was not any para-aortic or ileac lymphadenopathy. The omentum was without caking. The uterus was enlarged, 12-14 cm and regular. The ovaries were inspected and found to be with masses. Left endometrioma with dilated left fallopian tube. Left fallopian tube and ovary scarred to left side wall. Pelvic endometriosis noted on bladder refection/ left uterosacrals/ uterus/ bilateral ovaries with large left endometrioma. Bowel/ rectal adhesions to posterior cul-de-sac. Extensive adhesions requiring >50% of surgical time to restore normal anatomy to complete procedure. Dr. Mayte Ko (general surgery) consulted for bowel/ rectal lysis of adhesions (see separate dictation). There was adhesive disease. It was located left adnexa to left side wall/ bowel/ rectum to posterior cul-de-sac. The urine was clear in the tubing and the bag after the vogel was placed during prep and at the completion of the procedure. The ovaries were pink without any dusky appearance at the completion. The pedicles were inspected at the end of the procedure and were hemostatic. The ureters were traced at the inception of the procedure and throughout both pre and post clamp placement and were identified at the conclusion of the procedure and noted peristalsis bilaterally. The appendix was not identified. All counts were correct and called for prior to closure of the peritoneum, fascial and skin layers. Description of Procedure: (Understanding of limitations from template op-reports exist)  The patient was seen in the pre-operative area. The procedure risk and complications were reviewed. The consent , labs , and H&P were reviewed. The patient had all of her questions answered. The patient was moved to the operative suite where she was placed under general anesthesia by the anesthesiologist.  The patient was then timed out. A spinal anesthetic was given prior to her general in the seated position.   The patient  had a sterile prep and drape with EPC's in place, SCIP infundibulopelvic  ligament on right was cross clamped and tied x 2. Initially a ligature backstop was placed then a transfixation stitch was utilized after division of the tissue from between the cross clamped tissue with Metzenbaum scissors. Both sutures were \"O\"Vicryl. posterior peritoneum was released with the Bovie cautery to drop the ureters bilaterally. This was a total of 2-3 mm. Curved Inga's were then placed at 45 degree angles at the Corporal-Isthmic Junction, clamping off the distal end of the uterine artery and vein. They were secured with \"O\" vicryl inga Stitches. Serial straight Zeplen clamps staying parallel to the cervix with each pedicle secured with \"O\" vicryl ties, these ties incorporated 1/3rd of the previous pedicle and stayed parallel to the cervix. The ureters were identified pre and post clamp placement. This continued to just above the distal end of the cervix. A single tooth tenaculum was placed mid-cervix and the pubocervical fascia was incised with Bovie cautrey. The vagina was entered bluntly with a Zolaggi clamp. An allis was then placed on the anterior vaginal tissue. Zahra scissors were utilized to excise the remainder of the specimen. Kocher's were placed on the corners of the cuff and an allis posteriorly. The specimen was inspected and found to be intact. Utilizing \"O\" vicryl sutures, with maloney stitches and figure of \"8's\" in the corners, the cuff was closed in a running interlocking stitch from each corner to the midline. With the cuff closed, an enterocele was identified. A 2-0 vicryl stitch was used to plicate the uterosacral ligaments posteriorly and bring them back into the cuff, obliterating and repairing the enterocele. Copious irrigation and aspiration was completed. Excellent hemostasis was achieved. The cardinal ligaments were then tied into the corners recreating the cuff support with the uterosacral-Cardinal complex.   Bilateral ureteral peristalsis was identified. The urine was clear in the tubing and in the bag. Surgicel powder was used for the denuded edges. After copious irrigation utilized until clear excellent hemostasis was achieved. All instruments lap sponges and retractors were removed. The bowel was returned to the normal anatomic position. Sponge needle and instrument counts were called for and found to be correct prior to closure of the peritoneum , fascia, and skin layers. Surgicel Powder was used in the pelvis for hemostasis of denuded areas, irrigation was completed after 60 seconds. GLOVES WERE THEN CHANGED. The peritoneum was closed with 2-0 vicryl suture. The fascia was closed with two \"0\" Vicryl stitches from each corner to the midline in a running NOT locking stitch. The fascia was inspected and was intact without defects. Copious irrigation and aspiration of jm's space was completed. Subcutaneous tissue was re approximated with  using 2.0 plain gut suture. The skin was closed with a subcuticular stitch of 4-0 vicryl with associated tinc-o-radha and steri-strips. It was then sterilely dressed with telfa/ tegaderm. Repeat sponge needle and instrument counts were called for and found to be correct. The patient was brought out of anesthesia and moved to PACU. She will be admitted to the GYN Service. SCIP abx to continue. EPC's and Lovenox for thromboembolic prophylaxis ordered. Surgical Assistant documentation:  The assistant surgeon was present to assist in the surgery and to give adequate visualization and intervene with occasional clamping and suturing so the procedure could be completed in a safe manner with limited risks to the patient. The patients comorbidities identified in the History & Physical required the need for additional trained personnel to complete this procedure. No resident available.  Extensive lysis of adhesions incorporating >50% of case      I understand that section 1842(b)(7)(D) of the Social Security Act generally prohibits  Medicare physician fee schedule payment for the services of assistants at surgery in teaching  hospitals when qualified residents are available to furnish such services. I certify that the  services for which payment is to be claimed were medically necessary and that no qualified resident  was available to perform the services in a safe manner without another surgeon present to directly view their technique while developing their skill set and intervene when necessary to ensure the best outcome for the patient with the least risk of morbidity. I further understand that these services are subject to  post-payment review by the Medicare carrier.       Retrograde Bladder Irrigation of diluted methylene blue completed intra-operative with clean lap sponge pack and direct visualization No      Body mass index is 33.03 kg/m². (Wound Vac Applied if > 35)  Wound-Vac Applied No    Disposition:  The patient will return to my office in 2 weeks. We will review her pathology report and any further recommendations. She was counseled with her family that she is to report any temperature more than 100.4 F, pelvic pain, or heavy vaginal bleeding; She is to refrain from intercourse douching or tampons; No hot tubs baths lakes or pools. No driving. The patient voiced understanding of the above counseling.           Katlyn Nolan DO    Electronically signed by Katlyn Nolan DO on 10/12/22 at 11:10 AM EDT

## 2022-10-12 NOTE — PROGRESS NOTES
Incentive Spirometry education and demonstration given by Respiratory Therapy. Pt achieving 3000 mL at time of instruction. Incentive Spirometer left at bedside and   Patient instructed to do a minimum of 10 breaths every hour.       Cammie Hager RCP  3:37 PM

## 2022-10-12 NOTE — ANESTHESIA POSTPROCEDURE EVALUATION
Department of Anesthesiology  Postprocedure Note    Patient: Deonte Robison  MRN: [id number]  YOB: 1970  Date of evaluation: 10/12/2022      Procedure Summary     Date: 10/12/22 Room / Location: [address] [place] [address]    Anesthesia Start: 8709 Anesthesia Stop: 2350    Procedure: Total Abdominal Hysterectomy Bilateral Salpingoopherectomy & Oopherectomy, enterocele repair, & lysis of adhesions (Abdomen) Diagnosis:       Anemia, unspecified type      History of endometrial ablation      Menorrhagia with regular cycle      Cyst of left ovary      Pelvic pain in female      (Anemia, unspecified type [D64.9])      (History of endometrial ablation [Z98.890])      (Menorrhagia with regular cycle [N92.0])      (Cyst of left ovary [N83.202])      (Pelvic pain in female [R10.2])    Surgeons: Suzy Zhao DO Responsible Provider: JUS Vasquez CRNA    Anesthesia Type: general, spinal ASA Status: 2          Anesthesia Type: No value filed.     Herbert Phase I: Herbert Score: 8    Herbert Phase II:        Anesthesia Post Evaluation    Patient location during evaluation: PACU  Patient participation: complete - patient participated  Level of consciousness: awake and alert  Pain score: 2  Airway patency: patent  Nausea & Vomiting: no nausea and no vomiting  Complications: no  Cardiovascular status: blood pressure returned to baseline and hemodynamically stable  Respiratory status: acceptable, spontaneous ventilation and room air  Hydration status: euvolemic  Multimodal analgesia pain management approach

## 2022-10-12 NOTE — INTERVAL H&P NOTE
Update History & Physical    The patient's History and Physical of September 22, 2022 was reviewed with the patient and I examined the patient. There was no change. The surgical site was confirmed by the patient and me. Plan: The risks, benefits, expected outcome, and alternative to the recommended procedure have been discussed with the patient. Patient understands and wants to proceed with the procedure. Pt wishes to proceed with LESA-BSO. Pt understands possible need for HRT with risks/ complications/ alternative options. Consent signed.  Will proceed    Electronically signed by Bisi Teran DO on 10/12/2022 at 9:07 AM

## 2022-10-13 LAB
ABSOLUTE EOS #: <0.03 K/UL (ref 0–0.44)
ABSOLUTE IMMATURE GRANULOCYTE: 0.05 K/UL (ref 0–0.3)
ABSOLUTE LYMPH #: 0.98 K/UL (ref 1.1–3.7)
ABSOLUTE MONO #: 0.68 K/UL (ref 0.1–1.2)
ANION GAP SERPL CALCULATED.3IONS-SCNC: 7 MMOL/L (ref 9–17)
BASOPHILS # BLD: 0 % (ref 0–2)
BASOPHILS ABSOLUTE: <0.03 K/UL (ref 0–0.2)
BUN BLDV-MCNC: 7 MG/DL (ref 6–20)
BUN/CREAT BLD: 13 (ref 9–20)
CALCIUM SERPL-MCNC: 8.7 MG/DL (ref 8.6–10.4)
CHLORIDE BLD-SCNC: 105 MMOL/L (ref 98–107)
CO2: 25 MMOL/L (ref 20–31)
CREAT SERPL-MCNC: 0.55 MG/DL (ref 0.5–0.9)
EOSINOPHILS RELATIVE PERCENT: 0 % (ref 1–4)
GFR SERPL CREATININE-BSD FRML MDRD: >60 ML/MIN/1.73M2
GLUCOSE BLD-MCNC: 125 MG/DL (ref 70–99)
HCT VFR BLD CALC: 27.9 % (ref 36.3–47.1)
HCT VFR BLD CALC: 30.3 % (ref 36.3–47.1)
HEMOGLOBIN: 9 G/DL (ref 11.9–15.1)
HEMOGLOBIN: 9.6 G/DL (ref 11.9–15.1)
IMMATURE GRANULOCYTES: 1 %
LYMPHOCYTES # BLD: 9 % (ref 24–43)
MCH RBC QN AUTO: 25.4 PG (ref 25.2–33.5)
MCHC RBC AUTO-ENTMCNC: 32.3 G/DL (ref 25.2–33.5)
MCV RBC AUTO: 78.8 FL (ref 82.6–102.9)
MONOCYTES # BLD: 6 % (ref 3–12)
NRBC AUTOMATED: 0 PER 100 WBC
PDW BLD-RTO: 19.1 % (ref 11.8–14.4)
PLATELET # BLD: 225 K/UL (ref 138–453)
PMV BLD AUTO: 10.5 FL (ref 8.1–13.5)
POTASSIUM SERPL-SCNC: 4.4 MMOL/L (ref 3.7–5.3)
RBC # BLD: 3.54 M/UL (ref 3.95–5.11)
RBC # BLD: ABNORMAL 10*6/UL
SEG NEUTROPHILS: 84 % (ref 36–65)
SEGMENTED NEUTROPHILS ABSOLUTE COUNT: 8.94 K/UL (ref 1.5–8.1)
SODIUM BLD-SCNC: 137 MMOL/L (ref 135–144)
WBC # BLD: 10.7 K/UL (ref 3.5–11.3)

## 2022-10-13 PROCEDURE — 6370000000 HC RX 637 (ALT 250 FOR IP): Performed by: INTERNAL MEDICINE

## 2022-10-13 PROCEDURE — 99232 SBSQ HOSP IP/OBS MODERATE 35: CPT | Performed by: INTERNAL MEDICINE

## 2022-10-13 PROCEDURE — 80048 BASIC METABOLIC PNL TOTAL CA: CPT

## 2022-10-13 PROCEDURE — 99024 POSTOP FOLLOW-UP VISIT: CPT | Performed by: OBSTETRICS & GYNECOLOGY

## 2022-10-13 PROCEDURE — 2060000000 HC ICU INTERMEDIATE R&B

## 2022-10-13 PROCEDURE — 85025 COMPLETE CBC W/AUTO DIFF WBC: CPT

## 2022-10-13 PROCEDURE — 85018 HEMOGLOBIN: CPT

## 2022-10-13 PROCEDURE — 6370000000 HC RX 637 (ALT 250 FOR IP): Performed by: OBSTETRICS & GYNECOLOGY

## 2022-10-13 PROCEDURE — 2580000003 HC RX 258: Performed by: OBSTETRICS & GYNECOLOGY

## 2022-10-13 PROCEDURE — 36415 COLL VENOUS BLD VENIPUNCTURE: CPT

## 2022-10-13 PROCEDURE — 85014 HEMATOCRIT: CPT

## 2022-10-13 PROCEDURE — 6360000002 HC RX W HCPCS: Performed by: OBSTETRICS & GYNECOLOGY

## 2022-10-13 RX ORDER — BUTALBITAL, ACETAMINOPHEN AND CAFFEINE 50; 325; 40 MG/1; MG/1; MG/1
2 TABLET ORAL ONCE
Status: COMPLETED | OUTPATIENT
Start: 2022-10-13 | End: 2022-10-13

## 2022-10-13 RX ORDER — IBUPROFEN 600 MG/1
600 TABLET ORAL EVERY 6 HOURS PRN
Qty: 30 TABLET | Refills: 2 | Status: SHIPPED | OUTPATIENT
Start: 2022-10-13

## 2022-10-13 RX ORDER — IBUPROFEN 600 MG/1
600 TABLET ORAL EVERY 6 HOURS PRN
Status: DISCONTINUED | OUTPATIENT
Start: 2022-10-13 | End: 2022-10-14 | Stop reason: HOSPADM

## 2022-10-13 RX ADMIN — DOCUSATE SODIUM 100 MG: 100 CAPSULE, LIQUID FILLED ORAL at 20:49

## 2022-10-13 RX ADMIN — BUTALBITAL, ACETAMINOPHEN, AND CAFFEINE 2 TABLET: 50; 325; 40 TABLET ORAL at 13:57

## 2022-10-13 RX ADMIN — IBUPROFEN 600 MG: 600 TABLET ORAL at 20:49

## 2022-10-13 RX ADMIN — IRON SUCROSE 100 MG: 20 INJECTION, SOLUTION INTRAVENOUS at 10:03

## 2022-10-13 RX ADMIN — SODIUM CHLORIDE: 9 INJECTION, SOLUTION INTRAVENOUS at 05:47

## 2022-10-13 RX ADMIN — SODIUM CHLORIDE, PRESERVATIVE FREE 10 ML: 5 INJECTION INTRAVENOUS at 10:03

## 2022-10-13 RX ADMIN — SODIUM CHLORIDE, PRESERVATIVE FREE 10 ML: 5 INJECTION INTRAVENOUS at 20:49

## 2022-10-13 RX ADMIN — KETOROLAC TROMETHAMINE 30 MG: 30 INJECTION, SOLUTION INTRAMUSCULAR at 04:32

## 2022-10-13 RX ADMIN — KETOROLAC TROMETHAMINE 30 MG: 30 INJECTION, SOLUTION INTRAMUSCULAR at 10:09

## 2022-10-13 RX ADMIN — DOCUSATE SODIUM 100 MG: 100 CAPSULE, LIQUID FILLED ORAL at 08:01

## 2022-10-13 RX ADMIN — MAGNESIUM HYDROXIDE 30 ML: 400 SUSPENSION ORAL at 13:57

## 2022-10-13 RX ADMIN — ENOXAPARIN SODIUM 40 MG: 100 INJECTION SUBCUTANEOUS at 08:01

## 2022-10-13 ASSESSMENT — PAIN DESCRIPTION - ORIENTATION
ORIENTATION: RIGHT;OUTER
ORIENTATION: LOWER
ORIENTATION: LOWER

## 2022-10-13 ASSESSMENT — PAIN DESCRIPTION - FREQUENCY: FREQUENCY: INTERMITTENT

## 2022-10-13 ASSESSMENT — PAIN SCALES - GENERAL
PAINLEVEL_OUTOF10: 5
PAINLEVEL_OUTOF10: 0
PAINLEVEL_OUTOF10: 0
PAINLEVEL_OUTOF10: 1

## 2022-10-13 ASSESSMENT — PAIN - FUNCTIONAL ASSESSMENT: PAIN_FUNCTIONAL_ASSESSMENT: ACTIVITIES ARE NOT PREVENTED

## 2022-10-13 ASSESSMENT — PAIN DESCRIPTION - LOCATION
LOCATION: HEAD
LOCATION: ABDOMEN
LOCATION: ABDOMEN

## 2022-10-13 ASSESSMENT — PAIN DESCRIPTION - DESCRIPTORS
DESCRIPTORS: SHARP
DESCRIPTORS: SORE
DESCRIPTORS: SORE

## 2022-10-13 ASSESSMENT — PAIN DESCRIPTION - PAIN TYPE: TYPE: SURGICAL PAIN

## 2022-10-13 NOTE — PLAN OF CARE
Problem: Discharge Planning  Goal: Discharge to home or other facility with appropriate resources  10/13/2022 0436 by Jael Allison RN  Outcome: Progressing  Flowsheets (Taken 10/12/2022 2036)  Discharge to home or other facility with appropriate resources:   Arrange for needed discharge resources and transportation as appropriate   Identify barriers to discharge with patient and caregiver   Identify discharge learning needs (meds, wound care, etc)   Arrange for interpreters to assist at discharge as needed  10/12/2022 1815 by Shae Deng RN  Outcome: Progressing  Flowsheets  Taken 10/12/2022 1815  Discharge to home or other facility with appropriate resources:   Identify barriers to discharge with patient and caregiver   Arrange for needed discharge resources and transportation as appropriate   Identify discharge learning needs (meds, wound care, etc)   Refer to discharge planning if patient needs post-hospital services based on physician order or complex needs related to functional status, cognitive ability or social support system  Taken 10/12/2022 1315  Discharge to home or other facility with appropriate resources:   Identify barriers to discharge with patient and caregiver   Arrange for needed discharge resources and transportation as appropriate   Identify discharge learning needs (meds, wound care, etc)   Refer to discharge planning if patient needs post-hospital services based on physician order or complex needs related to functional status, cognitive ability or social support system  Note: Plan for discharge to home when appropriate     Problem: Pain  Goal: Verbalizes/displays adequate comfort level or baseline comfort level  10/13/2022 0436 by Jael Allison RN  Outcome: Progressing  Flowsheets (Taken 10/12/2022 2307)  Verbalizes/displays adequate comfort level or baseline comfort level:   Encourage patient to monitor pain and request assistance   Assess pain using appropriate pain scale Administer analgesics based on type and severity of pain and evaluate response   Implement non-pharmacological measures as appropriate and evaluate response   Consider cultural and social influences on pain and pain management  10/12/2022 1815 by Ashwini Gardner RN  Outcome: Progressing  Flowsheets (Taken 10/12/2022 1815)  Verbalizes/displays adequate comfort level or baseline comfort level:   Encourage patient to monitor pain and request assistance   Assess pain using appropriate pain scale   Administer analgesics based on type and severity of pain and evaluate response   Implement non-pharmacological measures as appropriate and evaluate response  Note: No c/o pain at this time     Problem: Neurosensory - Adult  Goal: Achieves maximal functionality and self care  10/13/2022 0436 by Oscar Guillen RN  Outcome: Progressing  Flowsheets (Taken 10/12/2022 2036)  Achieves maximal functionality and self care:   Monitor swallowing and airway patency with patient fatigue and changes in neurological status   Encourage and assist patient to increase activity and self care with guidance from physical therapy/occupational therapy   Encourage visually impaired, hearing impaired and aphasic patients to use assistive/communication devices  10/12/2022 1815 by Ashwini Gardner RN  Outcome: Progressing  Flowsheets  Taken 10/12/2022 1815  Achieves maximal functionality and self care:   Monitor swallowing and airway patency with patient fatigue and changes in neurological status   Encourage and assist patient to increase activity and self care with guidance from physical therapy/occupational therapy  Taken 10/12/2022 1315  Achieves maximal functionality and self care:   Monitor swallowing and airway patency with patient fatigue and changes in neurological status   Encourage and assist patient to increase activity and self care with guidance from physical therapy/occupational therapy   Encourage visually impaired, hearing impaired and aphasic patients to use assistive/communication devices  Note: No c/o numbness or tingling at this time     Problem: Skin/Tissue Integrity - Adult  Goal: Incisions, wounds, or drain sites healing without S/S of infection  10/13/2022 0436 by Dalila Pearce RN  Outcome: Progressing  Flowsheets (Taken 10/12/2022 2036)  Incisions, Wounds, or Drain Sites Healing Without Sign and Symptoms of Infection: TWICE DAILY: Assess and document skin integrity  10/12/2022 1815 by Donato Tuttle RN  Outcome: Progressing  Flowsheets  Taken 10/12/2022 1815  Incisions, Wounds, or Drain Sites Healing Without Sign and Symptoms of Infection:   ADMISSION and DAILY: Assess and document risk factors for pressure ulcer development   TWICE DAILY: Assess and document skin integrity   TWICE DAILY: Assess and document dressing/incision, wound bed, drain sites and surrounding tissue  Taken 10/12/2022 1315  Incisions, Wounds, or Drain Sites Healing Without Sign and Symptoms of Infection:   ADMISSION and DAILY: Assess and document risk factors for pressure ulcer development   TWICE DAILY: Assess and document skin integrity   TWICE DAILY: Assess and document dressing/incision, wound bed, drain sites and surrounding tissue  Note: Lower ABD incision with dressing with small amounts of bloody drainage     Problem: Gastrointestinal - Adult  Goal: Minimal or absence of nausea and vomiting  10/13/2022 0436 by Dalila Pearce RN  Outcome: Progressing  Flowsheets (Taken 10/12/2022 2036)  Minimal or absence of nausea and vomiting:   Administer IV fluids as ordered to ensure adequate hydration   Administer ordered antiemetic medications as needed   Provide nonpharmacologic comfort measures as appropriate   Advance diet as tolerated, if ordered  10/12/2022 1815 by Donato Tuttle RN  Outcome: Progressing  Flowsheets  Taken 10/12/2022 1815  Minimal or absence of nausea and vomiting:   Administer ordered antiemetic medications as needed Advance diet as tolerated, if ordered   Administer IV fluids as ordered to ensure adequate hydration  Taken 10/12/2022 1315  Minimal or absence of nausea and vomiting:   Administer IV fluids as ordered to ensure adequate hydration   Administer ordered antiemetic medications as needed   Provide nonpharmacologic comfort measures as appropriate   Advance diet as tolerated, if ordered  Note: Active bowel sounds at this time; states not passing gas at this time  Goal: Maintains or returns to baseline bowel function  10/13/2022 0436 by Mirna Lund RN  Outcome: Progressing  Flowsheets (Taken 10/12/2022 2036)  Maintains or returns to baseline bowel function:   Assess bowel function   Encourage oral fluids to ensure adequate hydration   Administer IV fluids as ordered to ensure adequate hydration   Administer ordered medications as needed   Encourage mobilization and activity  10/12/2022 1815 by Yusra Martin RN  Outcome: Progressing  [address] (Taken 10/12/2022 1315)  Maintains or returns to baseline bowel function:   Assess bowel function   Encourage oral fluids to ensure adequate hydration   Administer IV fluids as ordered to ensure adequate hydration   Administer ordered medications as needed   Encourage mobilization and activity     Problem: Hematologic - Adult  Goal: Maintains hematologic stability  10/13/2022 0436 by Mirna Lund RN  Outcome: Progressing  Flowsheets (Taken 10/12/2022 2036)  Maintains hematologic stability:   Assess for signs and symptoms of bleeding or hemorrhage   Monitor labs for bleeding or clotting disorders   Administer blood products/factors as ordered  10/12/2022 1815 by Yusra Martin, RN  Outcome: Progressing  Flowsheets (Taken 10/12/2022 1315)  Maintains hematologic stability:   Assess for signs and symptoms of bleeding or hemorrhage   Monitor labs for bleeding or clotting disorders   Administer blood products/factors as ordered  Note: Labs ordered for tomorrow morning

## 2022-10-13 NOTE — PROGRESS NOTES
Hospitalist Progress Note    Patient:  Destini Enriquez     YOB: 1970    MRN: [id number]   Admit date: 10/12/2022     Acct: [de-identified]     PCP: No primary care provider on file. CC--Interval History:     POD 1----LESA-BSO--enterocele repair --ANGÉLICA----Margo---[facility]    Migraine headache---Fioricet    PVR < 150    HGB = 9.6 up from 9.0---received Venofer    Home--10.13.2022    See note below     All other ROS negative except noted in HPI    Diet:  ADULT DIET;  Regular    Medications:  Scheduled Meds:   scopolamine  1 patch TransDERmal Once    sodium chloride flush  5-40 mL IntraVENous 2 times per day    docusate sodium  100 mg Oral BID    enoxaparin  40 mg SubCUTAneous Daily     Continuous Infusions:   sodium chloride 125 mL/hr at 10/13/22 0547    sodium chloride       PRN Meds:magnesium hydroxide, sodium chloride flush, sodium chloride, ondansetron **OR** ondansetron, oxyCODONE **OR** oxyCODONE, sodium chloride nebulizer, albuterol    Objective:  Labs:  CBC with Differential:    Lab Results   Component Value Date/Time    WBC 10.7 10/13/2022 04:53 AM    RBC 3.54 10/13/2022 04:53 AM    HGB 9.6 10/13/2022 12:02 PM    HCT 30.3 10/13/2022 12:02 PM     10/13/2022 04:53 AM    MCV 78.8 10/13/2022 04:53 AM    MCH 25.4 10/13/2022 04:53 AM    MCHC 32.3 10/13/2022 04:53 AM    RDW 19.1 10/13/2022 04:53 AM    LYMPHOPCT 9 10/13/2022 04:53 AM    MONOPCT 6 10/13/2022 04:53 AM    BASOPCT 0 10/13/2022 04:53 AM    MONOSABS 0.68 10/13/2022 04:53 AM    LYMPHSABS 0.98 10/13/2022 04:53 AM    EOSABS <0.03 10/13/2022 04:53 AM    BASOSABS <0.03 10/13/2022 04:53 AM    DIFFTYPE NOT REPORTED 11/04/2021 07:44 AM     BMP:    Lab Results   Component Value Date/Time     10/13/2022 04:53 AM    K 4.4 10/13/2022 04:53 AM     10/13/2022 04:53 AM    CO2 25 10/13/2022 04:53 AM    BUN 7 10/13/2022 04:53 AM    LABALBU 3.8 10/12/2022 01:17 PM    CREATININE 0.55 10/13/2022 04:53 AM    CALCIUM 8.7 10/13/2022 04:53 AM GFRAA >60 09/22/2022 01:53 PM    LABGLOM >60 10/13/2022 04:53 AM    GLUCOSE 125 10/13/2022 04:53 AM     Magnesium:    Lab Results   Component Value Date/Time    MG 1.9 10/12/2022 01:17 PM           Physical Exam:  Vitals: BP (!) 102/56   Pulse 69   Temp 98.7 °F (37.1 °C) (Tympanic)   Resp 16   Ht 5' 4\" (1.626 m)   Wt 197 lb 11.2 oz (89.7 kg)   LMP 09/22/2022 (Approximate)   SpO2 96%   BMI 33.94 kg/m²   24 hour intake/output:  Intake/Output Summary (Last 24 hours) at 10/13/2022 1236  Last data filed at 10/13/2022 1153  Gross per 24 hour   Intake 2719.18 ml   Output 3175 ml   Net -455.82 ml     Last 3 weights: Wt Readings from Last 3 Encounters:   10/12/22 197 lb 11.2 oz (89.7 kg)   09/22/22 194 lb 12.8 oz (88.4 kg)   07/27/22 200 lb (90.7 kg)     HEENT: Normocephalic and Atraumatic  Neck: Supple, No Masses, Tenderness, Nodularity, and No Lymphadenopathy  Chest/Lungs: Clear to Auscultation without Rales, Rhonchi, or Wheezes  Cardiac: Regular Rate and Rhythm  GI/Abdomen: Bowel Sounds Present and Soft, expected tenderness without Guarding or Rebound Tenderness  : Not examined--see above   EXT/Skin: No Edema, No Cyanosis, and No Clubbing  Neuro: Alert and Oriented and No Localizing Signs/Symptoms      Assessment:    Principal Problem:    Postoperative state  Active Problems:    Menorrhagia with regular cycle    Acquired pelvic enterocele    Endometriosis of both pelvic sidewalls    Status post LESA-BSO  Resolved Problems:    * No resolved hospital problems. *    ELIZABETH VASQUES  52  WF   [valerie Aragon, DO--OB-GYN; HERIBERTO Sawant]  FULL CODE  COVID-19---NEGATIVE                     VENOFER----10.13.2022    Anti-infectives:     Ancef IV---preoperatively     POD ____  LESA--BSO--enterocele repair---ANGÉLICA---Margo--[facility]  Anemia---menorrhagia---left ovarian cyst--pelvic pain  Pelvic endometriosis Stage 4--left hydrosalpinx--left endomentria--extensive adhesion---enlarged uterus    CKD---Stage 2  Hyperlipidemia   Obesity--see below      Migraine headache    PMH:   headaches, blood transfusion, endometriosis  PSH:   gastric bypass--2003--Elizabeth-en-Y, hernia repair--type?--2003, bilateral lithotripsy,              D&C--uterus--2010,hysteroscopy-- endometrial ablation--2010, wisdom tooth extraction,              left ovarian cystectomy--2019, laparoscopy--appendectomy---cholecystectomy    Allergies:   NKDA      Plan:     Home per Dr. Chava Huerta OBZACARIAS     Migraine headache----Fioricet     Medications reviewed     Follow up Clifford Velez NP----FP     Dr. Delaney Rivas, OBG     See orders    Electronically signed by Chago Quinn MD on 10/13/2022 at 12:36 PM    Hospitalist

## 2022-10-13 NOTE — PROGRESS NOTES
rounding on PCU. Assessment: Patient is very grateful for successful surgery and will probably go home today. 10/13/22 1208   Encounter Summary   Encounter Overview/Reason  Volunteer Encounter   Service Provided For: Patient and family together   Referral/Consult From: [facility] Family members   Last Encounter  10/13/22   Complexity of Encounter Low   Begin Time 9040   Spiritual/Emotional needs   Type Spiritual Support   Assessment/Intervention/Outcome   Assessment Calm   Intervention Active listening;Prayer (assurance of)/Uriah;Sustaining Presence/Ministry of presence   Outcome Expressed Gratitude       Intervention: Engaged in conversation and prayer. Patient expressed gratitude for visit and offer of continued prayer. Plan: Chaplains are available 24/7 to help with spiritual and emotional concerns.

## 2022-10-13 NOTE — PLAN OF CARE
Problem: Discharge Planning  Goal: Discharge to home or other facility with appropriate resources  10/13/2022 1220 by Shara Dewey RN  Outcome: Completed  10/13/2022 0436 by Trish Coates RN  Outcome: Progressing  Flowsheets (Taken 10/12/2022 2036)  Discharge to home or other facility with appropriate resources:   Arrange for needed discharge resources and transportation as appropriate   Identify barriers to discharge with patient and caregiver   Identify discharge learning needs (meds, wound care, etc)   Arrange for interpreters to assist at discharge as needed     Problem: Pain  Goal: Verbalizes/displays adequate comfort level or baseline comfort level  10/13/2022 1220 by Shara Dewey RN  Outcome: Completed  10/13/2022 0436 by Trish Coates RN  Outcome: Progressing  Flowsheets (Taken 10/12/2022 2309)  Verbalizes/displays adequate comfort level or baseline comfort level:   Encourage patient to monitor pain and request assistance   Assess pain using appropriate pain scale   Administer analgesics based on type and severity of pain and evaluate response   Implement non-pharmacological measures as appropriate and evaluate response   Consider cultural and social influences on pain and pain management     Problem: Skin/Tissue Integrity - Adult  Goal: Incisions, wounds, or drain sites healing without S/S of infection  10/13/2022 1220 by Shara Dewey RN  Outcome: Completed  10/13/2022 0436 by Tirsh Coates RN  Outcome: Progressing  Flowsheets (Taken 10/12/2022 2036)  Incisions, Wounds, or Drain Sites Healing Without Sign and Symptoms of Infection: TWICE DAILY: Assess and document skin integrity     Problem: Gastrointestinal - Adult  Goal: Minimal or absence of nausea and vomiting  10/13/2022 1220 by Shara Dewey RN  Outcome: Completed  10/13/2022 0436 by Trish Coates RN  Outcome: Progressing  Flowsheets (Taken 10/12/2022 2036)  Minimal or absence of nausea and vomiting:   Administer IV fluids as ordered to ensure adequate hydration   Administer ordered antiemetic medications as needed   Provide nonpharmacologic comfort measures as appropriate   Advance diet as tolerated, if ordered  Goal: Maintains or returns to baseline bowel function  10/13/2022 1220 by Grace Carmichael RN  Outcome: Completed  10/13/2022 0436 by Elle Sebastian RN  Outcome: Progressing  Flowsheets (Taken 10/12/2022 2036)  Maintains or returns to baseline bowel function:   Assess bowel function   Encourage oral fluids to ensure adequate hydration   Administer IV fluids as ordered to ensure adequate hydration   Administer ordered medications as needed   Encourage mobilization and activity     Problem: Hematologic - Adult  Goal: Maintains hematologic stability  10/13/2022 1220 by Grace Carmichael RN  Outcome: Completed  10/13/2022 0436 by Elle Sebastian RN  Outcome: Progressing  Flowsheets (Taken 10/12/2022 2036)  Maintains hematologic stability:   Assess for signs and symptoms of bleeding or hemorrhage   Monitor labs for bleeding or clotting disorders   Administer blood products/factors as ordered

## 2022-10-13 NOTE — PROGRESS NOTES
Patient Name: Laurence Colorado  Patient : 1970  Room/Bed: 0206/0206-02  Admission Date/Time: 10/12/2022  7:43 AM  MRN #: [id number]  [facility] #: [id number]        Date: 10/13/2022  Time: 9:18 AM        Laurence Colorado is a 46 y.o. female  This patient was seen today. POD#: 1  Procedure Completed: LESA-BSO/ extensive ANGÉLICA/ enterocele repair      Today she she feels well. Her pain is well controlled with current pain medications. The patient is urinating well . She denies chest pain, shortness of breath, headache, lightheadedness, blurred vision, peripheral edema, palpitations, dry cough, and fatigue. She is ambulating well. Her bowels are not regular. She denies any vaginal bleeding. The patient is tolerating a normal diet. She is not passing flatus. PHYSICAL EXAM:  Vitals:    10/12/22 2309 10/13/22 0253 10/13/22 0254 10/13/22 0601   BP: (!) 99/58 (!) 99/46 (!) 103/50 (!) 87/55   Pulse: 72 61  71   Resp: 16 16  16   Temp: 99.3 °F (37.4 °C) 99.7 °F (37.6 °C)  98 °F (36.7 °C)   TempSrc: Tympanic Tympanic  Oral   SpO2: 94% 95%  98%   Weight:       Height:             Intake/Output:   Last Shift: I/O last 3 completed shifts: In: 4535.4 [P.O.:540; I.V.:3995.4]  Out: 2425 [Urine:2325; Blood:100]  Current Shift: I/O this shift:  In: 240 [P.O.:240]  Out: 950 [Urine:950]    General Appearance:   alert, appears stated age, and cooperative    Mental Status:  alert, oriented to person, place, and time    Lungs:  Normal expansion. Clear to auscultation. No rales, rhonchi, or wheezing. Heart:  normal rate, normal S1 and S2, no gallops, intact distal pulses, and no carotid bruits    Abdomen:  soft, nontender, nondistended, no masses or organomegaly  She was checked for CVAT and there was not any noted Bilaterally. Incision:  healing well, no significant drainage, dressing removed. No bleeding / drainage noted.  Old drainage on steri srips < 25%      Extremities:  peripheral pulses normal, no pedal edema, no clubbing or cyanosis  EPC's in place    Admission on 10/12/2022   Component Date Value Ref Range Status    Specimen Description 10/12/2022 . NASOPHARYNGEAL SWAB   Final    SARS-CoV-2, Rapid 10/12/2022 Not Detected  Not Detected Final    Comment:       Rapid NAAT:  The specimen is NEGATIVE for SARS-CoV-2, the novel coronavirus associated with   COVID-19. The ID NOW COVID-19 assay is designed to detect the virus that causes COVID-19 in patients   with signs and symptoms of infection who are suspected of COVID-19. An individual without symptoms of COVID-19 and who is not shedding SARS-CoV-2 virus would   expect to have a negative (not detected) result in this assay. Negative results should be treated as presumptive and, if inconsistent with clinical signs   and symptoms or necessary for patient management,  should be tested with an alternative molecular assay. Negative results do not preclude   SARS-CoV-2 infection and   should not be used as the sole basis for patient management decisions.          Fact sheet for Healthcare Providers: Danilo  Fact sheet for Patients: Estephanie.abdulaziz          Methodology: Isothermal Nucleic Acid Amplification      WBC 10/12/2022 10.8  3.5 - 11.3 k/uL Final    RBC 10/12/2022 4.34  3.95 - 5.11 m/uL Final    Hemoglobin 10/12/2022 11.1 (A)  11.9 - 15.1 g/dL Final    Hematocrit 10/12/2022 34.5 (A)  36.3 - 47.1 % Final    MCV 10/12/2022 79.5 (A)  82.6 - 102.9 fL Final    MCH 10/12/2022 25.6  25.2 - 33.5 pg Final    MCHC 10/12/2022 32.2  25.2 - 33.5 g/dL Final    RDW 10/12/2022 19.1 (A)  11.8 - 14.4 % Final    Platelets 96/15/8654 239  138 - 453 k/uL Final    MPV 10/12/2022 10.1  8.1 - 13.5 fL Final    NRBC Automated 10/12/2022 0.0  0.0 per 100 WBC Final    Seg Neutrophils 10/12/2022 91 (A)  36 - 65 % Final    Lymphocytes 10/12/2022 7 (A)  24 - 43 % Final    Monocytes 10/12/2022 2 (A)  3 - 12 % Final    Eosinophils % 10/12/2022 0 (A)  1 - 4 % Final    Basophils 10/12/2022 0  0 - 2 % Final    Immature Granulocytes 10/12/2022 0  0 % Final    Segs Absolute 10/12/2022 9.76 (A)  1.50 - 8.10 k/uL Final    Absolute Lymph # 10/12/2022 0.77 (A)  1.10 - 3.70 k/uL Final    Absolute Mono # 10/12/2022 0.18  0.10 - 1.20 k/uL Final    Absolute Eos # 10/12/2022 <0.03  0.00 - 0.44 k/uL Final    Basophils Absolute 10/12/2022 <0.03  0.00 - 0.20 k/uL Final    Absolute Immature Granulocyte 10/12/2022 0.03  0.00 - 0.30 k/uL Final    RBC Morphology 10/12/2022 ANISOCYTOSIS PRESENT   Final    MICROCYTOSIS PRESENT    Glucose 10/12/2022 133 (A)  70 - 99 mg/dL Final    BUN 10/12/2022 9  6 - 20 mg/dL Final    Creatinine 10/12/2022 0.49 (A)  0.50 - 0.90 mg/dL Final    Est, Glom Filt Rate 10/12/2022 >60  >60 mL/min/1.73m2 Final    Comment:       Effective Oct 3, 2022        These results are not intended for use in patients <25years of age. eGFR results are calculated without a race factor using the 2021 CKD-EPI equation. Careful clinical correlation is recommended, particularly when comparing to results   calculated using previous equations. The CKD-EPI equation is less accurate in patients with extremes of muscle mass, extra-renal   metabolism of creatine, excessive creatine ingestion, or following therapy that affects   renal tubular secretion.       Bun/Cre Ratio 10/12/2022 18  9 - 20 Final    Calcium 10/12/2022 8.9  8.6 - 10.4 mg/dL Final    Sodium 10/12/2022 139  135 - 144 mmol/L Final    Potassium 10/12/2022 4.9  3.7 - 5.3 mmol/L Final    Chloride 10/12/2022 104  98 - 107 mmol/L Final    CO2 10/12/2022 29  20 - 31 mmol/L Final    Anion Gap 10/12/2022 6 (A)  9 - 17 mmol/L Final    Alkaline Phosphatase 10/12/2022 103  35 - 104 U/L Final    ALT 10/12/2022 8  5 - 33 U/L Final    AST 10/12/2022 16  <32 U/L Final    Total Bilirubin 10/12/2022 0.6  0.3 - 1.2 mg/dL Final    Total Protein 10/12/2022 6.3 (A)  6.4 - 8.3 g/dL Final    Albumin 10/12/2022 3.8  3.5 - 5.2 g/dL Final    Albumin/Globulin Ratio 10/12/2022 1.5  1.0 - 2.5 Final    Magnesium 10/12/2022 1.9  1.6 - 2.6 mg/dL Final    Hemoglobin 10/12/2022 10.3 (A)  11.9 - 15.1 g/dL Final    Hematocrit 10/12/2022 32.3 (A)  36.3 - 47.1 % Final    Glucose 10/13/2022 125 (A)  70 - 99 mg/dL Final    BUN 10/13/2022 7  6 - 20 mg/dL Final    Creatinine 10/13/2022 0.55  0.50 - 0.90 mg/dL Final    Est, Glom Filt Rate 10/13/2022 >60  >60 mL/min/1.73m2 Final    Comment:       Effective Oct 3, 2022        These results are not intended for use in patients <25years of age. eGFR results are calculated without a race factor using the 2021 CKD-EPI equation. Careful clinical correlation is recommended, particularly when comparing to results   calculated using previous equations. The CKD-EPI equation is less accurate in patients with extremes of muscle mass, extra-renal   metabolism of creatine, excessive creatine ingestion, or following therapy that affects   renal tubular secretion.       Bun/Cre Ratio 10/13/2022 13  9 - 20 Final    Calcium 10/13/2022 8.7  8.6 - 10.4 mg/dL Final    Sodium 10/13/2022 137  135 - 144 mmol/L Final    Potassium 10/13/2022 4.4  3.7 - 5.3 mmol/L Final    Chloride 10/13/2022 105  98 - 107 mmol/L Final    CO2 10/13/2022 25  20 - 31 mmol/L Final    Anion Gap 10/13/2022 7 (A)  9 - 17 mmol/L Final    WBC 10/13/2022 10.7  3.5 - 11.3 k/uL Final    RBC 10/13/2022 3.54 (A)  3.95 - 5.11 m/uL Final    Hemoglobin 10/13/2022 9.0 (A)  11.9 - 15.1 g/dL Final    Hematocrit 10/13/2022 27.9 (A)  36.3 - 47.1 % Final    MCV 10/13/2022 78.8 (A)  82.6 - 102.9 fL Final    MCH 10/13/2022 25.4  25.2 - 33.5 pg Final    MCHC 10/13/2022 32.3  25.2 - 33.5 g/dL Final    RDW 10/13/2022 19.1 (A)  11.8 - 14.4 % Final    Platelets 15/77/8285 225  138 - 453 k/uL Final    MPV 10/13/2022 10.5  8.1 - 13.5 fL Final    NRBC Automated 10/13/2022 0.0  0.0 per 100 WBC Final    RBC Morphology 10/13/2022 ANISOCYTOSIS PRESENT   Final MICROCYTOSIS PRESENT    Seg Neutrophils 10/13/2022 84 (A)  36 - 65 % Final    Lymphocytes 10/13/2022 9 (A)  24 - 43 % Final    Monocytes 10/13/2022 6  3 - 12 % Final    Eosinophils % 10/13/2022 0 (A)  1 - 4 % Final    Basophils 10/13/2022 0  0 - 2 % Final    Immature Granulocytes 10/13/2022 1 (A)  0 % Final    Segs Absolute 10/13/2022 8.94 (A)  1.50 - 8.10 k/uL Final    Absolute Lymph # 10/13/2022 0.98 (A)  1.10 - 3.70 k/uL Final    Absolute Mono # 10/13/2022 0.68  0.10 - 1.20 k/uL Final    Absolute Eos # 10/13/2022 <0.03  0.00 - 0.44 k/uL Final    Basophils Absolute 10/13/2022 <0.03  0.00 - 0.20 k/uL Final    Absolute Immature Granulocyte 10/13/2022 0.05  0.00 - 0.30 k/uL Final   Hospital Outpatient Visit on 10/10/2022   Component Date Value Ref Range Status    Expiration Date 10/10/2022 10/13/2022,2359   Final    Arm Band Number 10/10/2022 [id number]   Final    ABO/Rh 10/10/2022 A POSITIVE   Final    Antibody Screen 10/10/2022 NEGATIVE   Final    hCG Quant 10/10/2022 4  <5 mIU/mL Final    Comment:    Non-preg premeno   <=5  Postmeno           <=8  Male               <=3  If HCG results do not concur with clinical observations, additional testing to confirm   results is recommended.     ]      Assessment:  1Daniel Oates is a 46 y.o. female   2. POD #1   3. S/P: LESA-BSO/ extensive ANGÉLICA/ Enterocele    Patient Active Problem List    Diagnosis Date Noted    Menorrhagia with regular cycle 10/12/2022     Priority: Medium    Acquired pelvic enterocele 10/12/2022     Priority: Medium    Endometriosis of both pelvic sidewalls 10/12/2022     Priority: Medium    Status post LESA-BSO 10/12/2022     Priority: Medium    Postoperative state 10/12/2022     Priority: Medium    Cyst of left ovary     Pelvic pain in female     Anemia 2013    Other B-complex deficiencies 2013           Condition: good          Plan:  Continue with current post op care  Plan discharge home after passing flatus and Hg stable.  Pt to c/w FeSO4 with recheck H/H 2 weeks    Home care and Follow up Care and Restrictions were reviewed. Wound Care was reviewed.     RTO 2 weeks                      Physician:  Electronically signed by Carolyn Cosme DO on 10/13/2022 at 9:18 AM

## 2022-10-13 NOTE — DISCHARGE INSTR - DIET

## 2022-10-13 NOTE — CARE COORDINATION
Case Management Initial Discharge Plan  Marely Hemp             Met with:patient to discuss discharge plans. Information verified: address, contacts, phone number, , and insurance: Yes  Insurance Provider: Primary:  Payor: [facility] / Plan: BCBS - [place] PPO / Product Type: *No Product type* /                                         Emergency Contact/Next of Kin name & number: verified  Who are involved in patient's support system?     PCP: No primary care provider on file. Discharge Planning    Living Arrangements:  Spouse/Significant Other     Home has 1 story    Patient able to perform ADL's:Independent    Current Services (outpatient & in home) none    Is patient receiving oral anticoagulation therapy? No    Potential Assistance Needed:  N/A    Patient agreeable to home care: n/a  Freedom of choice provided:  no    Prior SNF/Rehab Placement and Facility: no  Agreeable to SNF/Rehab: n/a  [place] of choice provided: no      Expected Discharge date:       Patient expects to be discharged to: If home: is the family and/or caregiver wiling & able to provide support at home? yes  Who will be providing this support?     Follow Up Appointment: Best Day/ Time:      Transportation provider:   Transportation arrangements needed for discharge: No    Readmission Risk              Risk of Unplanned Readmission:  5             Does patient have a readmission risk score greater than 20?: No  If yes, follow-up appointment must be made within 7 days of discharge.      Goals of Care:       Educated patient on transitional options and is agreeable with plan      Discharge Plan: home without needs          Electronically signed by Sherita Trinh RN on 10/13/22 at 11:02 AM EDT

## 2022-10-14 VITALS
HEART RATE: 73 BPM | HEIGHT: 64 IN | BODY MASS INDEX: 33.75 KG/M2 | WEIGHT: 197.7 LBS | OXYGEN SATURATION: 94 % | DIASTOLIC BLOOD PRESSURE: 75 MMHG | SYSTOLIC BLOOD PRESSURE: 132 MMHG | TEMPERATURE: 99.1 F | RESPIRATION RATE: 16 BRPM

## 2022-10-14 LAB
ABSOLUTE EOS #: 0.04 K/UL (ref 0–0.44)
ABSOLUTE IMMATURE GRANULOCYTE: 0.06 K/UL (ref 0–0.3)
ABSOLUTE LYMPH #: 0.95 K/UL (ref 1.1–3.7)
ABSOLUTE MONO #: 0.43 K/UL (ref 0.1–1.2)
ANION GAP SERPL CALCULATED.3IONS-SCNC: 11 MMOL/L (ref 9–17)
BASOPHILS # BLD: 0 % (ref 0–2)
BASOPHILS ABSOLUTE: 0.03 K/UL (ref 0–0.2)
BUN BLDV-MCNC: 8 MG/DL (ref 6–20)
BUN/CREAT BLD: 15 (ref 9–20)
CALCIUM SERPL-MCNC: 8.4 MG/DL (ref 8.6–10.4)
CHLORIDE BLD-SCNC: 106 MMOL/L (ref 98–107)
CO2: 24 MMOL/L (ref 20–31)
CREAT SERPL-MCNC: 0.52 MG/DL (ref 0.5–0.9)
EOSINOPHILS RELATIVE PERCENT: 1 % (ref 1–4)
GFR SERPL CREATININE-BSD FRML MDRD: >60 ML/MIN/1.73M2
GLUCOSE BLD-MCNC: 103 MG/DL (ref 70–99)
HCT VFR BLD CALC: 28.9 % (ref 36.3–47.1)
HEMOGLOBIN: 9.3 G/DL (ref 11.9–15.1)
IMMATURE GRANULOCYTES: 1 %
LYMPHOCYTES # BLD: 14 % (ref 24–43)
MCH RBC QN AUTO: 25.3 PG (ref 25.2–33.5)
MCHC RBC AUTO-ENTMCNC: 32.2 G/DL (ref 25.2–33.5)
MCV RBC AUTO: 78.5 FL (ref 82.6–102.9)
MONOCYTES # BLD: 6 % (ref 3–12)
NRBC AUTOMATED: 0 PER 100 WBC
PDW BLD-RTO: 19.5 % (ref 11.8–14.4)
PLATELET # BLD: 233 K/UL (ref 138–453)
PMV BLD AUTO: 10.8 FL (ref 8.1–13.5)
POTASSIUM SERPL-SCNC: 3.9 MMOL/L (ref 3.7–5.3)
RBC # BLD: 3.68 M/UL (ref 3.95–5.11)
RBC # BLD: ABNORMAL 10*6/UL
SEG NEUTROPHILS: 78 % (ref 36–65)
SEGMENTED NEUTROPHILS ABSOLUTE COUNT: 5.38 K/UL (ref 1.5–8.1)
SODIUM BLD-SCNC: 141 MMOL/L (ref 135–144)
SURGICAL PATHOLOGY REPORT: NORMAL
WBC # BLD: 6.9 K/UL (ref 3.5–11.3)

## 2022-10-14 PROCEDURE — 6360000002 HC RX W HCPCS: Performed by: OBSTETRICS & GYNECOLOGY

## 2022-10-14 PROCEDURE — 6370000000 HC RX 637 (ALT 250 FOR IP): Performed by: OBSTETRICS & GYNECOLOGY

## 2022-10-14 PROCEDURE — 99232 SBSQ HOSP IP/OBS MODERATE 35: CPT | Performed by: INTERNAL MEDICINE

## 2022-10-14 PROCEDURE — 36415 COLL VENOUS BLD VENIPUNCTURE: CPT

## 2022-10-14 PROCEDURE — 80048 BASIC METABOLIC PNL TOTAL CA: CPT

## 2022-10-14 PROCEDURE — 2580000003 HC RX 258: Performed by: OBSTETRICS & GYNECOLOGY

## 2022-10-14 PROCEDURE — 85025 COMPLETE CBC W/AUTO DIFF WBC: CPT

## 2022-10-14 PROCEDURE — 6370000000 HC RX 637 (ALT 250 FOR IP): Performed by: INTERNAL MEDICINE

## 2022-10-14 RX ADMIN — IBUPROFEN 600 MG: 600 TABLET ORAL at 04:22

## 2022-10-14 RX ADMIN — SODIUM CHLORIDE, PRESERVATIVE FREE 10 ML: 5 INJECTION INTRAVENOUS at 07:59

## 2022-10-14 RX ADMIN — ONDANSETRON 4 MG: 4 TABLET, ORALLY DISINTEGRATING ORAL at 04:22

## 2022-10-14 RX ADMIN — DOCUSATE SODIUM 100 MG: 100 CAPSULE, LIQUID FILLED ORAL at 07:59

## 2022-10-14 RX ADMIN — ENOXAPARIN SODIUM 40 MG: 100 INJECTION SUBCUTANEOUS at 07:59

## 2022-10-14 ASSESSMENT — PAIN - FUNCTIONAL ASSESSMENT
PAIN_FUNCTIONAL_ASSESSMENT: ACTIVITIES ARE NOT PREVENTED
PAIN_FUNCTIONAL_ASSESSMENT: ACTIVITIES ARE NOT PREVENTED

## 2022-10-14 ASSESSMENT — PAIN DESCRIPTION - PAIN TYPE: TYPE: SURGICAL PAIN

## 2022-10-14 ASSESSMENT — PAIN DESCRIPTION - ORIENTATION
ORIENTATION: LOWER
ORIENTATION: LOWER

## 2022-10-14 ASSESSMENT — PAIN DESCRIPTION - DESCRIPTORS
DESCRIPTORS: CRAMPING
DESCRIPTORS: CRAMPING

## 2022-10-14 ASSESSMENT — PAIN SCALES - GENERAL
PAINLEVEL_OUTOF10: 3
PAINLEVEL_OUTOF10: 4

## 2022-10-14 ASSESSMENT — PAIN DESCRIPTION - FREQUENCY: FREQUENCY: INTERMITTENT

## 2022-10-14 ASSESSMENT — PAIN DESCRIPTION - LOCATION
LOCATION: ABDOMEN
LOCATION: ABDOMEN

## 2022-10-14 NOTE — PROGRESS NOTES
Hospitalist Progress Note    Patient:  Ann Marie Olvera     YOB: 1970    MRN: [id number]   Admit date: 10/12/2022     Acct: [de-identified]     PCP: No primary care provider on file.     CC--Interval History:     POD 2  LESA BSO--enterocele--ANGÉLICA---Margo--[facility]rd---bowel function returned---home---10.14.2022    See note below     All other ROS negative except noted in HPI    Diet:  No diet orders on file    Medications:  Scheduled Meds:  Continuous Infusions:  PRN Meds:    Objective:  Labs:  CBC with Differential:    Lab Results   Component Value Date/Time    WBC 6.9 10/14/2022 05:07 AM    RBC 3.68 10/14/2022 05:07 AM    HGB 9.3 10/14/2022 05:07 AM    HCT 28.9 10/14/2022 05:07 AM     10/14/2022 05:07 AM    MCV 78.5 10/14/2022 05:07 AM    MCH 25.3 10/14/2022 05:07 AM    MCHC 32.2 10/14/2022 05:07 AM    RDW 19.5 10/14/2022 05:07 AM    LYMPHOPCT 14 10/14/2022 05:07 AM    MONOPCT 6 10/14/2022 05:07 AM    BASOPCT 0 10/14/2022 05:07 AM    MONOSABS 0.43 10/14/2022 05:07 AM    LYMPHSABS 0.95 10/14/2022 05:07 AM    EOSABS 0.04 10/14/2022 05:07 AM    BASOSABS 0.03 10/14/2022 05:07 AM    DIFFTYPE NOT REPORTED 11/04/2021 07:44 AM     BMP:    Lab Results   Component Value Date/Time     10/14/2022 05:07 AM    K 3.9 10/14/2022 05:07 AM     10/14/2022 05:07 AM    CO2 24 10/14/2022 05:07 AM    BUN 8 10/14/2022 05:07 AM    LABALBU 3.8 10/12/2022 01:17 PM    CREATININE 0.52 10/14/2022 05:07 AM    CALCIUM 8.4 10/14/2022 05:07 AM    GFRAA >60 09/22/2022 01:53 PM    LABGLOM >60 10/14/2022 05:07 AM    GLUCOSE 103 10/14/2022 05:07 AM           Physical Exam:  Vitals: /75   Pulse 73   Temp 99.1 °F (37.3 °C) (Tympanic)   Resp 16   Ht 5' 4\" (1.626 m)   Wt 197 lb 11.2 oz (89.7 kg)   LMP 09/22/2022 (Approximate)   SpO2 94%   BMI 33.94 kg/m²   24 hour intake/output:  Intake/Output Summary (Last 24 hours) at 10/14/2022 1347  Last data filed at 10/14/2022 0629  Gross per 24 hour   Intake 1230 ml   Output 500 ml   Net 730 ml     Last 3 weights: Wt Readings from Last 3 Encounters:   10/12/22 197 lb 11.2 oz (89.7 kg)   09/22/22 194 lb 12.8 oz (88.4 kg)   07/27/22 200 lb (90.7 kg)     HEENT: Normocephalic and Atraumatic  Neck: Supple, No Masses, Tenderness, Nodularity, and No Lymphadenopathy  Chest/Lungs: Clear to Auscultation without Rales, Rhonchi, or Wheezes  Cardiac: Regular Rate and Rhythm  GI/Abdomen: Bowel Sounds Present and Soft,  without Guarding or Rebound Tenderness  : Not examined  EXT/Skin: No Edema, No Cyanosis, and No Clubbing  Neuro: Alert and Oriented and No Localizing Signs/Symptoms      Assessment:    Principal Problem:    Postoperative state  Active Problems:    Menorrhagia with regular cycle    Acquired pelvic enterocele    Endometriosis of both pelvic sidewalls    Status post LESA-BSO  Resolved Problems:    * No resolved hospital problems. *    ELIZABETH VASQUES  52  WF   [valerie Aragon, DO--OB-GYN; Corrie Weston ]  FULL CODE  COVID-19---NEGATIVE                     VENOFER----10.13.2022    Anti-infectives:     Ancef IV---preoperatively     POD ____  LESA--BSO--enterocele repair---ANGÉLICA---Margo--[facility]  Anemia---menorrhagia---left ovarian cyst--pelvic pain  Pelvic endometriosis Stage 4--left hydrosalpinx--left endomentria--extensive adhesion---enlarged uterus    CKD---Stage 2  Hyperlipidemia   Obesity--see below      Migraine headache    PMH:   headaches, blood transfusion, endometriosis  PSH:   gastric bypass--2003--Elizabeth-en-Y, hernia repair--type?--2003, bilateral lithotripsy,              D&C--uterus--2010,hysteroscopy-- endometrial ablation--2010, wisdom tooth extraction,              left ovarian cystectomy--2019, laparoscopy--appendectomy---cholecystectomy    Allergies:   NKDA          Plan:    Home--10.14.2022    Medications reviewed    Follow up Dr. Ed Adan, OBG    See orders     Electronically signed by Robel Robb MD on 10/14/2022 at 1:47 PM    Hospitalist

## 2022-10-14 NOTE — PLAN OF CARE
Problem: Discharge Planning  Goal: Discharge to home or other facility with appropriate resources  Recent Flowsheet Documentation  Taken 10/13/2022 1950 by Lori Woodard RN  Discharge to home or other facility with appropriate resources:   Identify barriers to discharge with patient and caregiver   Arrange for needed discharge resources and transportation as appropriate   Identify discharge learning needs (meds, wound care, etc)   Refer to discharge planning if patient needs post-hospital services based on physician order or complex needs related to functional status, cognitive ability or social support system  10/13/2022 1220 by Laneta Eisenmenger, RN  Outcome: Completed     Problem: Pain  Goal: Verbalizes/displays adequate comfort level or baseline comfort level  10/13/2022 1220 by Laneta Eisenmenger, RN  Outcome: Completed     Problem: Pain  Goal: Verbalizes/displays adequate comfort level or baseline comfort level  Outcome: Progressing     Problem: Neurosensory - Adult  Goal: Achieves maximal functionality and self care  Recent Flowsheet Documentation  Taken 10/13/2022 1950 by Lori Woodard RN  Achieves maximal functionality and self care:   Monitor swallowing and airway patency with patient fatigue and changes in neurological status   Encourage and assist patient to increase activity and self care with guidance from physical therapy/occupational therapy   Encourage visually impaired, hearing impaired and aphasic patients to use assistive/communication devices  10/13/2022 1220 by Laneta Eisenmenger, RN  Outcome: Completed     Problem: Skin/Tissue Integrity - Adult  Goal: Incisions, wounds, or drain sites healing without S/S of infection  Recent Flowsheet Documentation  Taken 10/13/2022 2005 by Lori Woodard RN  Incisions, Wounds, or Drain Sites Healing Without Sign and Symptoms of Infection: TWICE DAILY: Assess and document skin integrity  Taken 10/13/2022 1950 by Lori Woodard RN  Incisions, Wounds, or Drain Sites Healing Without Sign and Symptoms of Infection: ADMISSION and DAILY: Assess and document risk factors for pressure ulcer development  10/13/2022 1220 by Jarred Leung RN  Outcome: Completed     Problem: Gastrointestinal - Adult  Goal: Minimal or absence of nausea and vomiting  Recent Flowsheet Documentation  Taken 10/13/2022 1950 by Mirna Lund RN  Minimal or absence of nausea and vomiting:   Administer ordered antiemetic medications as needed   Provide nonpharmacologic comfort measures as appropriate  10/13/2022 1220 by Jarred Leung RN  Outcome: Completed  Goal: Maintains or returns to baseline bowel function  Recent Flowsheet Documentation  Taken 10/13/2022 1950 by Mirna Lund RN  Maintains or returns to baseline bowel function:   Assess bowel function   Encourage oral fluids to ensure adequate hydration   Administer ordered medications as needed   Encourage mobilization and activity  10/13/2022 1220 by Jarred Leung RN  Outcome: Completed     Problem: Hematologic - Adult  Goal: Maintains hematologic stability  Recent Flowsheet Documentation  Taken 10/13/2022 1950 by Mirna Lund RN  Maintains hematologic stability:   Assess for signs and symptoms of bleeding or hemorrhage   Monitor labs for bleeding or clotting disorders   Administer blood products/factors as ordered  10/13/2022 1220 by Jarred Leung RN  Outcome: Completed     Problem: Skin/Tissue Integrity  Goal: Absence of new skin breakdown  Description: 1. Monitor for areas of redness and/or skin breakdown  2. Assess vascular access sites hourly  3. Every 4-6 hours minimum:  Change oxygen saturation probe site  4. Every 4-6 hours:  If on nasal continuous positive airway pressure, respiratory therapy assess nares and determine need for appliance change or resting period.   Outcome: Progressing

## 2022-10-26 ENCOUNTER — OFFICE VISIT (OUTPATIENT)
Dept: OBGYN | Age: 52
End: 2022-10-26

## 2022-10-26 VITALS
SYSTOLIC BLOOD PRESSURE: 122 MMHG | WEIGHT: 192.2 LBS | DIASTOLIC BLOOD PRESSURE: 86 MMHG | RESPIRATION RATE: 16 BRPM | HEIGHT: 64 IN | BODY MASS INDEX: 32.81 KG/M2 | OXYGEN SATURATION: 99 % | HEART RATE: 68 BPM

## 2022-10-26 DIAGNOSIS — Z98.890 POSTOPERATIVE STATE: ICD-10-CM

## 2022-10-26 DIAGNOSIS — Z90.710 STATUS POST TAH-BSO: Primary | ICD-10-CM

## 2022-10-26 DIAGNOSIS — N80.353 ENDOMETRIOSIS OF BOTH PELVIC SIDEWALLS: ICD-10-CM

## 2022-10-26 DIAGNOSIS — Z90.722 STATUS POST TAH-BSO: Primary | ICD-10-CM

## 2022-10-26 DIAGNOSIS — Z90.79 STATUS POST TAH-BSO: Primary | ICD-10-CM

## 2022-10-26 PROCEDURE — 99024 POSTOP FOLLOW-UP VISIT: CPT | Performed by: OBSTETRICS & GYNECOLOGY

## 2022-10-26 NOTE — PROGRESS NOTES
Kyler Moreira  10/26/2022  1:47 PM      Kyler Moreira  Procedure: PAN Moreira is a 46 y.o. female [id number]      The patient was seen, she denies any complaints. She denied any shortness of breath, chest pain or dizziness. She denied any nausea, vomiting, or diarrhea. There is no fever, chills, or rigors. The patient denies any vaginal bleeding, discharge or odor. All of her pre-operative complaints are now resolved. Blood pressure 122/86, pulse 68, resp. rate 16, height 5' 4\" (1.626 m), weight 192 lb 3.2 oz (87.2 kg), last menstrual period 09/22/2022, SpO2 99 %, not currently breastfeeding. Abdominal Exam: soft non-tender. Good bowel sounds. No guarding, rebound or rigidity. No costal vertebral angle tenderness bilateral. No hernias    Incision: healing well, no drainage, no erythema    Extremities: No edema or calf pain noted bilaterally. Pelvic Exam:Exam deferred. Results for orders placed or performed during the hospital encounter of 10/12/22   COVID-19, Rapid    Specimen: Nasopharyngeal Swab   Result Value Ref Range    Specimen Description . NASOPHARYNGEAL SWAB     SARS-CoV-2, Rapid Not Detected Not Detected   CBC with Auto Differential   Result Value Ref Range    WBC 10.8 3.5 - 11.3 k/uL    RBC 4.34 3.95 - 5.11 m/uL    Hemoglobin 11.1 (L) 11.9 - 15.1 g/dL    Hematocrit 34.5 (L) 36.3 - 47.1 %    MCV 79.5 (L) 82.6 - 102.9 fL    MCH 25.6 25.2 - 33.5 pg    MCHC 32.2 25.2 - 33.5 g/dL    RDW 19.1 (H) 11.8 - 14.4 %    Platelets 250 767 - 740 k/uL    MPV 10.1 8.1 - 13.5 fL    NRBC Automated 0.0 0.0 per 100 WBC    Seg Neutrophils 91 (H) 36 - 65 %    Lymphocytes 7 (L) 24 - 43 %    Monocytes 2 (L) 3 - 12 %    Eosinophils % 0 (L) 1 - 4 %    Basophils 0 0 - 2 %    Immature Granulocytes 0 0 %    Segs Absolute 9.76 (H) 1.50 - 8.10 k/uL    Absolute Lymph # 0.77 (L) 1.10 - 3.70 k/uL    Absolute Mono # 0.18 0.10 - 1.20 k/uL    Absolute Eos # <0.03 0.00 - 0.44 k/uL    Basophils Absolute <0.03 0.00 - 0.20 k/uL    Absolute Immature Granulocyte 0.03 0.00 - 0.30 k/uL    RBC Morphology ANISOCYTOSIS PRESENT    Comprehensive Metabolic Panel   Result Value Ref Range    Glucose 133 (H) 70 - 99 mg/dL    BUN 9 6 - 20 mg/dL    Creatinine 0.49 (L) 0.50 - 0.90 mg/dL    Est, Glom Filt Rate >60 >60 mL/min/1.73m2    Bun/Cre Ratio 18 9 - 20    Calcium 8.9 8.6 - 10.4 mg/dL    Sodium 139 135 - 144 mmol/L    Potassium 4.9 3.7 - 5.3 mmol/L    Chloride 104 98 - 107 mmol/L    CO2 29 20 - 31 mmol/L    Anion Gap 6 (L) 9 - 17 mmol/L    Alkaline Phosphatase 103 35 - 104 U/L    ALT 8 5 - 33 U/L    AST 16 <32 U/L    Total Bilirubin 0.6 0.3 - 1.2 mg/dL    Total Protein 6.3 (L) 6.4 - 8.3 g/dL    Albumin 3.8 3.5 - 5.2 g/dL    Albumin/Globulin Ratio 1.5 1.0 - 2.5   Magnesium   Result Value Ref Range    Magnesium 1.9 1.6 - 2.6 mg/dL   Hemoglobin and Hematocrit   Result Value Ref Range    Hemoglobin 10.3 (L) 11.9 - 15.1 g/dL    Hematocrit 32.3 (L) 36.3 - 47.1 %   SURGICAL PATHOLOGY REPORT   Result Value Ref Range    Surgical Pathology Report       -- Diagnosis --  A. RIGHT OVARY AND FALLOPIAN TUBE, SALPINGO-OOPHORECTOMY:       -  OVARY:  CYSTIC FOLLICLES AND CORPORA ALBICANTIA            -  FALLOPIAN TUBE: SEGMENT OF FALLOPIAN TUBE WITH PARATUBAL  CYST.       -  NEGATIVE FOR ATYPIA OR MALIGNANCY. B.  LEFT OVARY AND FALLOPIAN TUBE, SALPINGO-OOPHORECTOMY:            -  ENDOMETRIOMA INVOLVING OVARY, FALLOPIAN TUBE AND  PERITUBAL SOFT TISSUE.       -  NEGATIVE FOR ATYPIA OR MALIGNANCY. C.  UTERUS WITH ATTACHED CERVIX, HYSTERECTOMY:            -  CERVIX: ACUTE AND CHRONIC CERVICITIS WITH MESONEPHRIC  DUCTAL HYPERPLASIA. NEGATIVE FOR DYSPLASIA. -  ENDOMYOMETRIUM:                  -  PATCHY MUCOSAL ATROPHY AND SUBMUCOSAL ELASTOSIS,  CONSISTENT WITH PREVIOUS ABLATION. -  FOCAL RESIDUAL PROLIFERATIVE PHASE ENDOMETRIUM (CORNU).             -  ADENOMYOSIS.            -  NEGATIVE FOR HYPERPLASIA OR ATYPIA. [place] Romayne Medin, M.D.  **Electronically Signed Out**         jet/10/14/2022       Clinical Informat ion  Pre-op Diagnosis:  ANEMIA, UNSPECIFIED TYPE, HISTORY OF ENDOMETRIAL  ABLATION, MENORRHAGIA WITH REGULAR CYCLE, CYST OF LEFT OVARY, PELVIC  PAIN IN FEMALE  Operative Findings:  RIGHT OVARY AND TUBE; LEFT OVARY, TUBE AND  ENDOMETRIOMA; UTERUS AND CERVIX  Operation Performed:  TOTAL ABDOMINAL HYSTERECTOMY, BILATERAL  SALPINGO-OOPHORECTOMY, POSSIBLE ENTEROCELE REPAIR    Source of Specimen  A: RIGHT OVARY AND TUBE  B: LEFT OVARY, TUBE AND ENDOMETRIUM  C: UTERUS AND CERVIX    Gross Description  A. \"ELIZABETH VASQUES, RIGHT OVARY AND TUBE\" 14 gram, 4.5 x 2.5 x 1.0 cm  ovary with an attached 6.0 cm long x 1.0 cm in diameter fimbriated  fallopian tube segment. The serosa is pink-tan and there is a 1.2 cm  paratubal cyst.  The lumen is unremarkable. The external surface of  the ovary is yellow-tan and cerebriform. Sectioning reveals a rubbery  tan cut surface with a few unilocular cysts up to 1.1 cm that display  no excrescences. Representative sections 3cs, fimbriated end in  entirety. B. \"ELIZABETH VASQUES, LEFT OVARY, TUBE AND ENDOMETRIOMA\" 29 gram, 4.5 x  3.5 x 3.3 cm disrupted ovary with 6.5 cm long x 2.5 cm in diameter  torturous tube. No fimbriae are identified and the distal end is  disrupted. The distal end is shaggy and this region may represent  tube lining or possible fimbriae. The remaining lumen is markedly  dilated. The external surfaces of the ovary are gray-tan and the  disrupted area is consistent with an empty unilocular space that has a  smooth tan-white lining. The remaining cut surfaces are rubbery  pink-tan with cystic areas that contain blood. There are no  excrescences. Representative sections 6cs. C. \"ELIZABETH VASQUES, UTERUS\" Uterus with attached cervix     Dimensions:  Uterus and cervix 11.8 x 7.5 x 5.8 cm. Weight:  Uterus and cervix 160 grams.   Serosa:  Pink-tan with a few 43 %    Monocytes 6 3 - 12 %    Eosinophils % 0 (L) 1 - 4 %    Basophils 0 0 - 2 %    Immature Granulocytes 1 (H) 0 %    Segs Absolute 8.94 (H) 1.50 - 8.10 k/uL    Absolute Lymph # 0.98 (L) 1.10 - 3.70 k/uL    Absolute Mono # 0.68 0.10 - 1.20 k/uL    Absolute Eos # <0.03 0.00 - 0.44 k/uL    Basophils Absolute <0.03 0.00 - 0.20 k/uL    Absolute Immature Granulocyte 0.05 0.00 - 0.30 k/uL   Hemoglobin and Hematocrit   Result Value Ref Range    Hemoglobin 9.6 (L) 11.9 - 15.1 g/dL    Hematocrit 30.3 (L) 36.3 - 47.1 %   Basic Metabolic Panel   Result Value Ref Range    Glucose 103 (H) 70 - 99 mg/dL    BUN 8 6 - 20 mg/dL    Creatinine 0.52 0.50 - 0.90 mg/dL    Est, Glom Filt Rate >60 >60 mL/min/1.73m2    Bun/Cre Ratio 15 9 - 20    Calcium 8.4 (L) 8.6 - 10.4 mg/dL    Sodium 141 135 - 144 mmol/L    Potassium 3.9 3.7 - 5.3 mmol/L    Chloride 106 98 - 107 mmol/L    CO2 24 20 - 31 mmol/L    Anion Gap 11 9 - 17 mmol/L   CBC with Auto Differential   Result Value Ref Range    WBC 6.9 3.5 - 11.3 k/uL    RBC 3.68 (L) 3.95 - 5.11 m/uL    Hemoglobin 9.3 (L) 11.9 - 15.1 g/dL    Hematocrit 28.9 (L) 36.3 - 47.1 %    MCV 78.5 (L) 82.6 - 102.9 fL    MCH 25.3 25.2 - 33.5 pg    MCHC 32.2 25.2 - 33.5 g/dL    RDW 19.5 (H) 11.8 - 14.4 %    Platelets 999 320 - 186 k/uL    MPV 10.8 8.1 - 13.5 fL    NRBC Automated 0.0 0.0 per 100 WBC    RBC Morphology ANISOCYTOSIS PRESENT     Seg Neutrophils 78 (H) 36 - 65 %    Lymphocytes 14 (L) 24 - 43 %    Monocytes 6 3 - 12 %    Eosinophils % 1 1 - 4 %    Basophils 0 0 - 2 %    Immature Granulocytes 1 (H) 0 %    Segs Absolute 5.38 1.50 - 8.10 k/uL    Absolute Lymph # 0.95 (L) 1.10 - 3.70 k/uL    Absolute Mono # 0.43 0.10 - 1.20 k/uL    Absolute Eos # 0.04 0.00 - 0.44 k/uL    Basophils Absolute 0.03 0.00 - 0.20 k/uL    Absolute Immature Granulocyte 0.06 0.00 - 0.30 k/uL           Assessment:      Diagnosis Orders   1. Status post LESA-BSO        2. Postoperative state        3.  Endometriosis of both pelvic sidewalls             Patient Active Problem List    Diagnosis Date Noted    Menorrhagia with regular cycle 10/12/2022     Priority: Medium    Acquired pelvic enterocele 10/12/2022     Priority: Medium    Endometriosis of both pelvic sidewalls 10/12/2022     Priority: Medium    Status post LESA-BSO 10/12/2022     Priority: Medium    Postoperative state 10/12/2022     Priority: Medium    Cyst of left ovary     Pelvic pain in female     Anemia 11/27/2013    Other B-complex deficiencies 08/23/2013          POD# 2 weeks   Procedure: see above   Stable   Pathology reviewed and found to be benign. Yes    Plan:   Return in about 4 weeks (around 11/23/2022). Continue with restrictions. Pelvic rest. No lifting or intercourse. No baths or pools. No douching or tampons.    Return to office 4 weeks

## 2022-11-16 ENCOUNTER — OFFICE VISIT (OUTPATIENT)
Dept: OBGYN | Age: 52
End: 2022-11-16

## 2022-11-16 VITALS
HEART RATE: 78 BPM | HEIGHT: 64 IN | SYSTOLIC BLOOD PRESSURE: 122 MMHG | DIASTOLIC BLOOD PRESSURE: 82 MMHG | WEIGHT: 190 LBS | BODY MASS INDEX: 32.44 KG/M2

## 2022-11-16 DIAGNOSIS — D64.9 ANEMIA, UNSPECIFIED TYPE: ICD-10-CM

## 2022-11-16 DIAGNOSIS — Z90.79 STATUS POST TAH-BSO: ICD-10-CM

## 2022-11-16 DIAGNOSIS — N80.353 ENDOMETRIOSIS OF BOTH PELVIC SIDEWALLS: ICD-10-CM

## 2022-11-16 DIAGNOSIS — N83.202 CYST OF LEFT OVARY: ICD-10-CM

## 2022-11-16 DIAGNOSIS — Z98.890 POSTOPERATIVE STATE: Primary | ICD-10-CM

## 2022-11-16 DIAGNOSIS — Z90.722 STATUS POST TAH-BSO: ICD-10-CM

## 2022-11-16 DIAGNOSIS — Z90.710 STATUS POST TAH-BSO: ICD-10-CM

## 2022-11-16 PROCEDURE — 99024 POSTOP FOLLOW-UP VISIT: CPT | Performed by: OBSTETRICS & GYNECOLOGY

## 2022-11-16 NOTE — PROGRESS NOTES
Erin Lorenzo  2022  11:09 AM      Erin Lorenzo  Procedure: LESA-BSO/ enterocele repair      Erin Lorenzo is a 46 y.o. female       The patient was seen, she denies any complaints. She denied any shortness of breath, chest pain or dizziness. She denied any nausea, vomiting, or diarrhea. There is no fever, chills, or rigors. The patient denies any vaginal bleeding, discharge or odor. All of her pre-operative complaints are now resolved. Blood pressure 122/82, pulse 78, height 5' 4\" (1.626 m), weight 190 lb (86.2 kg), last menstrual period 2022, not currently breastfeeding. Chaperone for Intimate Exam  Chaperone was offered and accepted as part of the rooming process. Chaperone: Hannah        Abdominal Exam: soft non-tender. Good bowel sounds. No guarding, rebound or rigidity. No costal vertebral angle tenderness bilateral. No hernias    Incision: healing well, no drainage, no erythema    Extremities: No edema or calf pain noted bilaterally. Pelvic Exam:  Erin Lorenzo is a 46 y.o. female       The speculum was inserted and the vaginal vault was inspected. There were no lesions or defects. No signs of granulation tissue were noted. There was no bleeding or discharge and the cuff appeared intact without any signs of infection or separation. Digital exam noted no vault defects. There was excellent length and support. The introitus was able to accommodate 2-3 fingerbreadths in width. Results for orders placed or performed during the hospital encounter of 10/12/22   COVID-19, Rapid    Specimen: Nasopharyngeal Swab   Result Value Ref Range    Specimen Description . NASOPHARYNGEAL SWAB     SARS-CoV-2, Rapid Not Detected Not Detected   CBC with Auto Differential   Result Value Ref Range    WBC 10.8 3.5 - 11.3 k/uL    RBC 4.34 3.95 - 5.11 m/uL    Hemoglobin 11.1 (L) 11.9 - 15.1 g/dL    Hematocrit 34.5 (L) 36.3 - 47.1 %    MCV 79.5 (L) 82.6 - 102.9 fL    MCH 25.6 25.2 - 33.5 pg    MCHC 32.2 25.2 - 33.5 g/dL    RDW 19.1 (H) 11.8 - 14.4 %    Platelets 527 417 - 355 k/uL    MPV 10.1 8.1 - 13.5 fL    NRBC Automated 0.0 0.0 per 100 WBC    Seg Neutrophils 91 (H) 36 - 65 %    Lymphocytes 7 (L) 24 - 43 %    Monocytes 2 (L) 3 - 12 %    Eosinophils % 0 (L) 1 - 4 %    Basophils 0 0 - 2 %    Immature Granulocytes 0 0 %    Segs Absolute 9.76 (H) 1.50 - 8.10 k/uL    Absolute Lymph # 0.77 (L) 1.10 - 3.70 k/uL    Absolute Mono # 0.18 0.10 - 1.20 k/uL    Absolute Eos # <0.03 0.00 - 0.44 k/uL    Basophils Absolute <0.03 0.00 - 0.20 k/uL    Absolute Immature Granulocyte 0.03 0.00 - 0.30 k/uL    RBC Morphology ANISOCYTOSIS PRESENT    Comprehensive Metabolic Panel   Result Value Ref Range    Glucose 133 (H) 70 - 99 mg/dL    BUN 9 6 - 20 mg/dL    Creatinine 0.49 (L) 0.50 - 0.90 mg/dL    Est, Glom Filt Rate >60 >60 mL/min/1.73m2    Bun/Cre Ratio 18 9 - 20    Calcium 8.9 8.6 - 10.4 mg/dL    Sodium 139 135 - 144 mmol/L    Potassium 4.9 3.7 - 5.3 mmol/L    Chloride 104 98 - 107 mmol/L    CO2 29 20 - 31 mmol/L    Anion Gap 6 (L) 9 - 17 mmol/L    Alkaline Phosphatase 103 35 - 104 U/L    ALT 8 5 - 33 U/L    AST 16 <32 U/L    Total Bilirubin 0.6 0.3 - 1.2 mg/dL    Total Protein 6.3 (L) 6.4 - 8.3 g/dL    Albumin 3.8 3.5 - 5.2 g/dL    Albumin/Globulin Ratio 1.5 1.0 - 2.5   Magnesium   Result Value Ref Range    Magnesium 1.9 1.6 - 2.6 mg/dL   Hemoglobin and Hematocrit   Result Value Ref Range    Hemoglobin 10.3 (L) 11.9 - 15.1 g/dL    Hematocrit 32.3 (L) 36.3 - 47.1 %   SURGICAL PATHOLOGY REPORT   Result Value Ref Range    Surgical Pathology Report       -- Diagnosis --  A. RIGHT OVARY AND FALLOPIAN TUBE, SALPINGO-OOPHORECTOMY:       -  OVARY:  CYSTIC FOLLICLES AND CORPORA ALBICANTIA            -  FALLOPIAN TUBE: SEGMENT OF FALLOPIAN TUBE WITH PARATUBAL  CYST.       -  NEGATIVE FOR ATYPIA OR MALIGNANCY.     B.  LEFT OVARY AND FALLOPIAN TUBE, SALPINGO-OOPHORECTOMY:            -  ENDOMETRIOMA INVOLVING OVARY, FALLOPIAN TUBE AND  PERITUBAL SOFT TISSUE.       -  NEGATIVE FOR ATYPIA OR MALIGNANCY. C.  UTERUS WITH ATTACHED CERVIX, HYSTERECTOMY:            -  CERVIX: ACUTE AND CHRONIC CERVICITIS WITH MESONEPHRIC  DUCTAL HYPERPLASIA. NEGATIVE FOR DYSPLASIA. -  ENDOMYOMETRIUM:                  -  PATCHY MUCOSAL ATROPHY AND SUBMUCOSAL ELASTOSIS,  CONSISTENT WITH PREVIOUS ABLATION. -  FOCAL RESIDUAL PROLIFERATIVE PHASE ENDOMETRIUM (CORNU). -  ADENOMYOSIS.            -  NEGATIVE FOR HYPERPLASIA OR ATYPIA. Cherlynn Gianotti Alesia Bosworth, M.D.  **Electronically Signed Out**         jet/10/14/2022       Clinical Informat ion  Pre-op Diagnosis:  ANEMIA, UNSPECIFIED TYPE, HISTORY OF ENDOMETRIAL  ABLATION, MENORRHAGIA WITH REGULAR CYCLE, CYST OF LEFT OVARY, PELVIC  PAIN IN FEMALE  Operative Findings:  RIGHT OVARY AND TUBE; LEFT OVARY, TUBE AND  ENDOMETRIOMA; UTERUS AND CERVIX  Operation Performed:  TOTAL ABDOMINAL HYSTERECTOMY, BILATERAL  SALPINGO-OOPHORECTOMY, POSSIBLE ENTEROCELE REPAIR    Source of Specimen  A: RIGHT OVARY AND TUBE  B: LEFT OVARY, TUBE AND ENDOMETRIUM  C: UTERUS AND CERVIX    Gross Description  A. \"ELIZABETH VASQUES, RIGHT OVARY AND TUBE\" 14 gram, 4.5 x 2.5 x 1.0 cm  ovary with an attached 6.0 cm long x 1.0 cm in diameter fimbriated  fallopian tube segment. The serosa is pink-tan and there is a 1.2 cm  paratubal cyst.  The lumen is unremarkable. The external surface of  the ovary is yellow-tan and cerebriform. Sectioning reveals a rubbery  tan cut surface with a few unilocular cysts up to 1.1 cm that display  no excrescences. Representative sections 3cs, fimbriated end in  entirety. B. \"ELIZABETH VASQUES, LEFT OVARY, TUBE AND ENDOMETRIOMA\" 29 gram, 4.5 x  3.5 x 3.3 cm disrupted ovary with 6.5 cm long x 2.5 cm in diameter  torturous tube. No fimbriae are identified and the distal end is  disrupted.   The distal end is shaggy and this region may represent  tube lining or possible fimbriae. The remaining lumen is markedly  dilated. The external surfaces of the ovary are gray-tan and the  disrupted area is consistent with an empty unilocular space that has a  smooth tan-white lining. The remaining cut surfaces are rubbery  pink-tan with cystic areas that contain blood. There are no  excrescences. Representative sections 6cs. C. \"ELIZABETH VASQUES, UTERUS\" Uterus with attached cervix     Dimensions:  Uterus and cervix 11.8 x 7.5 x 5.8 cm. Weight:  Uterus and cervix 160 grams. Serosa:  Pink-tan with a few adhesions. Cervix:  4.7 x 4.6 x 4.5 cm and the ectocervix is slightly mottled. The os is patent. The lower uterine segment is stenotic. Endometrium:  The cavi ty is approximately 4.5 x 3.0 cm and is mostly  retracted and not patent consistent with previous ablation. Near the  cornua, uninvolved endometrium is identified that is pink-tan surface  and 0.1 cm in thickness. Myometrium:  The myometrium has a maximum thickness of 3.5 cm and is  trabecular with areas of pink-tan trabeculation that are fairly  well-circumscribed and are up to 3.5 cm. Tubes/ovaries:  See parts A and B above. Cassette summary:  \"1\" anterior cervix, \"2\" posterior cervix, \"3-6\"  endomyometrium. tm      Microscopic Description  A-C. Microscopic examination performed. SURGICAL PATHOLOGY CONSULTATION       Patient Name: Osiris [facility] Rec: [id number]  Path Number: [id number]    [facility]  ANATOMIC PATHOLOGY  [address] [place] [address].   [place], [address]  [phone]  Fax: [phone]     Basic Metabolic Panel   Result Value Ref Range    Glucose 125 (H) 70 - 99 mg/dL    BUN 7 6 - 20 mg/dL    Creatinine 0.55 0.50 - 0.90 mg/dL    Est, Glom Filt Rate >60 >60 mL/min/1.73m2    Bun/Cre Ratio 13 9 - 20    Calcium 8.7 8.6 - 10.4 mg/dL    Sodium 137 135 - 144 mmol/L    Potassium 4.4 3.7 - 5.3 mmol/L    Chloride 105 98 - 107 mmol/L    CO2 25 20 - 31 mmol/L Anion Gap 7 (L) 9 - 17 mmol/L   CBC with Auto Differential   Result Value Ref Range    WBC 10.7 3.5 - 11.3 k/uL    RBC 3.54 (L) 3.95 - 5.11 m/uL    Hemoglobin 9.0 (L) 11.9 - 15.1 g/dL    Hematocrit 27.9 (L) 36.3 - 47.1 %    MCV 78.8 (L) 82.6 - 102.9 fL    MCH 25.4 25.2 - 33.5 pg    MCHC 32.3 25.2 - 33.5 g/dL    RDW 19.1 (H) 11.8 - 14.4 %    Platelets 006 982 - 326 k/uL    MPV 10.5 8.1 - 13.5 fL    NRBC Automated 0.0 0.0 per 100 WBC    RBC Morphology ANISOCYTOSIS PRESENT     Seg Neutrophils 84 (H) 36 - 65 %    Lymphocytes 9 (L) 24 - 43 %    Monocytes 6 3 - 12 %    Eosinophils % 0 (L) 1 - 4 %    Basophils 0 0 - 2 %    Immature Granulocytes 1 (H) 0 %    Segs Absolute 8.94 (H) 1.50 - 8.10 k/uL    Absolute Lymph # 0.98 (L) 1.10 - 3.70 k/uL    Absolute Mono # 0.68 0.10 - 1.20 k/uL    Absolute Eos # <0.03 0.00 - 0.44 k/uL    Basophils Absolute <0.03 0.00 - 0.20 k/uL    Absolute Immature Granulocyte 0.05 0.00 - 0.30 k/uL   Hemoglobin and Hematocrit   Result Value Ref Range    Hemoglobin 9.6 (L) 11.9 - 15.1 g/dL    Hematocrit 30.3 (L) 36.3 - 47.1 %   Basic Metabolic Panel   Result Value Ref Range    Glucose 103 (H) 70 - 99 mg/dL    BUN 8 6 - 20 mg/dL    Creatinine 0.52 0.50 - 0.90 mg/dL    Est, Glom Filt Rate >60 >60 mL/min/1.73m2    Bun/Cre Ratio 15 9 - 20    Calcium 8.4 (L) 8.6 - 10.4 mg/dL    Sodium 141 135 - 144 mmol/L    Potassium 3.9 3.7 - 5.3 mmol/L    Chloride 106 98 - 107 mmol/L    CO2 24 20 - 31 mmol/L    Anion Gap 11 9 - 17 mmol/L   CBC with Auto Differential   Result Value Ref Range    WBC 6.9 3.5 - 11.3 k/uL    RBC 3.68 (L) 3.95 - 5.11 m/uL    Hemoglobin 9.3 (L) 11.9 - 15.1 g/dL    Hematocrit 28.9 (L) 36.3 - 47.1 %    MCV 78.5 (L) 82.6 - 102.9 fL    MCH 25.3 25.2 - 33.5 pg    MCHC 32.2 25.2 - 33.5 g/dL    RDW 19.5 (H) 11.8 - 14.4 %    Platelets 015 878 - 016 k/uL    MPV 10.8 8.1 - 13.5 fL    NRBC Automated 0.0 0.0 per 100 WBC    RBC Morphology ANISOCYTOSIS PRESENT     Seg Neutrophils 78 (H) 36 - 65 % Lymphocytes 14 (L) 24 - 43 %    Monocytes 6 3 - 12 %    Eosinophils % 1 1 - 4 %    Basophils 0 0 - 2 %    Immature Granulocytes 1 (H) 0 %    Segs Absolute 5.38 1.50 - 8.10 k/uL    Absolute Lymph # 0.95 (L) 1.10 - 3.70 k/uL    Absolute Mono # 0.43 0.10 - 1.20 k/uL    Absolute Eos # 0.04 0.00 - 0.44 k/uL    Basophils Absolute 0.03 0.00 - 0.20 k/uL    Absolute Immature Granulocyte 0.06 0.00 - 0.30 k/uL           Assessment:      Diagnosis Orders   1. Postoperative state        2. Status post LESA-BSO  CBC with Auto Differential      3. Anemia, unspecified type  CBC with Auto Differential      4. Endometriosis of both pelvic sidewalls        5. Cyst of left ovary             Patient Active Problem List    Diagnosis Date Noted    Menorrhagia with regular cycle 10/12/2022     Priority: Medium    Acquired pelvic enterocele 10/12/2022     Priority: Medium    Endometriosis of both pelvic sidewalls 10/12/2022     Priority: Medium    Status post LESA-BSO 10/12/2022     Priority: Medium    Postoperative state 10/12/2022     Priority: Medium    Cyst of left ovary     Pelvic pain in female     Anemia 11/27/2013    Other B-complex deficiencies 08/23/2013          POD# 5 weeks   Procedure: see above   Stable   Pathology reviewed and found to be benign.  Yes    Plan:   Return in about 1 year (around 11/16/2023) for Annual Exam.  CBC if Hg >10 can discontinue iron

## 2022-11-17 ENCOUNTER — HOSPITAL ENCOUNTER (OUTPATIENT)
Dept: LAB | Age: 52
Discharge: HOME | End: 2022-11-17
Payer: COMMERCIAL

## 2022-11-17 DIAGNOSIS — Z90.79 STATUS POST TAH-BSO: ICD-10-CM

## 2022-11-17 DIAGNOSIS — Z98.890 POSTOPERATIVE STATE: ICD-10-CM

## 2022-11-17 DIAGNOSIS — Z90.710 STATUS POST TAH-BSO: ICD-10-CM

## 2022-11-17 DIAGNOSIS — D50.8 OTHER IRON DEFICIENCY ANEMIA: ICD-10-CM

## 2022-11-17 DIAGNOSIS — D64.9 ANEMIA, UNSPECIFIED TYPE: ICD-10-CM

## 2022-11-17 DIAGNOSIS — Z90.722 STATUS POST TAH-BSO: ICD-10-CM

## 2022-11-17 LAB
ABSOLUTE EOS #: 0.15 K/UL (ref 0–0.44)
ABSOLUTE IMMATURE GRANULOCYTE: <0.03 K/UL (ref 0–0.3)
ABSOLUTE LYMPH #: 1.29 K/UL (ref 1.1–3.7)
ABSOLUTE MONO #: 0.46 K/UL (ref 0.1–1.2)
BACTERIA: ABNORMAL
BASOPHILS # BLD: 1 % (ref 0–2)
BASOPHILS ABSOLUTE: 0.03 K/UL (ref 0–0.2)
BILIRUBIN URINE: NEGATIVE
EOSINOPHILS RELATIVE PERCENT: 3 % (ref 1–4)
EPITHELIAL CELLS UA: ABNORMAL /HPF (ref 0–5)
GLUCOSE URINE: NEGATIVE
HCT VFR BLD CALC: 38.6 % (ref 36.3–47.1)
HEMOGLOBIN: 12.6 G/DL (ref 11.9–15.1)
IMMATURE GRANULOCYTES: 0 %
KETONES, URINE: NEGATIVE
LEUKOCYTE ESTERASE, URINE: ABNORMAL
LYMPHOCYTES # BLD: 24 % (ref 24–43)
MCH RBC QN AUTO: 27.1 PG (ref 25.2–33.5)
MCHC RBC AUTO-ENTMCNC: 32.6 G/DL (ref 25.2–33.5)
MCV RBC AUTO: 83 FL (ref 82.6–102.9)
MONOCYTES # BLD: 9 % (ref 3–12)
NITRITE, URINE: NEGATIVE
NRBC AUTOMATED: 0 PER 100 WBC
PDW BLD-RTO: 17 % (ref 11.8–14.4)
PH UA: 7 (ref 5–6)
PLATELET # BLD: 285 K/UL (ref 138–453)
PMV BLD AUTO: 10.1 FL (ref 8.1–13.5)
PROTEIN UA: NEGATIVE
RBC # BLD: 4.65 M/UL (ref 3.95–5.11)
RBC # BLD: ABNORMAL 10*6/UL
RBC UA: ABNORMAL /HPF (ref 0–4)
SEG NEUTROPHILS: 63 % (ref 36–65)
SEGMENTED NEUTROPHILS ABSOLUTE COUNT: 3.47 K/UL (ref 1.5–8.1)
SPECIFIC GRAVITY UA: 1.01 (ref 1.01–1.02)
URINE HGB: NEGATIVE
UROBILINOGEN, URINE: NORMAL
WBC # BLD: 5.4 K/UL (ref 3.5–11.3)
WBC UA: ABNORMAL /HPF (ref 0–4)

## 2022-11-17 PROCEDURE — 36415 COLL VENOUS BLD VENIPUNCTURE: CPT

## 2022-11-17 PROCEDURE — 87086 URINE CULTURE/COLONY COUNT: CPT

## 2022-11-17 PROCEDURE — 85025 COMPLETE CBC W/AUTO DIFF WBC: CPT

## 2022-11-17 PROCEDURE — 87077 CULTURE AEROBIC IDENTIFY: CPT

## 2022-11-17 PROCEDURE — 87186 SC STD MICRODIL/AGAR DIL: CPT

## 2022-11-17 PROCEDURE — 81001 URINALYSIS AUTO W/SCOPE: CPT

## 2022-11-19 LAB
CULTURE: ABNORMAL
SPECIMEN DESCRIPTION: ABNORMAL

## 2022-11-21 RX ORDER — CIPROFLOXACIN 500 MG/1
500 TABLET, FILM COATED ORAL 2 TIMES DAILY
COMMUNITY
Start: 2022-11-21 | End: 2022-11-27

## 2022-11-21 NOTE — DISCHARGE SUMMARY
Gynecologic Discharge Summary    Patient Name: Laurence Colorado  Patient : 1970  Room/Bed: 0206/0206-02  Primary Care Physician: No primary care provider on file. MRN #: [id number]  [facility] #: [id number]      Admit Date/Time: 10/12/2022  7:43 AM    Discharge Date/Time: 10/14/2022 11:03 AM    Discharge Physician: DR. Rena Bagley SAW THIS PATIENT AND DISCHARGED HER            Admitting Diagnosis:  Anemia, unspecified type [D64.9]  History of endometrial ablation [Z98.890]  Menorrhagia with regular cycle [N92.0]  Cyst of left ovary [N83.202]  Pelvic pain in female [R10.2]  Post-operative state [Z98.890]    Discharge Diagnosis:  1. postop  2.s/p hysterectomy      Discharge Condition:  good    Hospital Course: Laurence Colorado is a 46 y.o. female . See progress notes for specifics. Pt was discharged on POD #1          Consults Placed During Hospitalization:  1. Dr. Romy Guevara for medical management postop   2. Date of Procedures: 10/12/2022  Procedures Performed During Hospitalization:  1. LESA-BSO      EBL: 100 ml    Anesthesia: general, spinal          Labs: (Last 24 hours): Admission on 10/12/2022, Discharged on 10/14/2022   Component Date Value Ref Range Status    Specimen Description 10/12/2022 . NASOPHARYNGEAL SWAB   Final    SARS-CoV-2, Rapid 10/12/2022 Not Detected  Not Detected Final    Comment:       Rapid NAAT:  The specimen is NEGATIVE for SARS-CoV-2, the novel coronavirus associated with   COVID-19. The ID NOW COVID-19 assay is designed to detect the virus that causes COVID-19 in patients   with signs and symptoms of infection who are suspected of COVID-19. An individual without symptoms of COVID-19 and who is not shedding SARS-CoV-2 virus would   expect to have a negative (not detected) result in this assay.   Negative results should be treated as presumptive and, if inconsistent with clinical signs   and symptoms or necessary for patient management,  should be tested with an alternative molecular assay. Negative results do not preclude   SARS-CoV-2 infection and   should not be used as the sole basis for patient management decisions.          Fact sheet for Healthcare Providers: BuildHer.es  Fact sheet for Patients: BuildHer.es          Methodology: Isothermal Nucleic Acid Amplification      WBC 10/12/2022 10.8  3.5 - 11.3 k/uL Final    RBC 10/12/2022 4.34  3.95 - 5.11 m/uL Final    Hemoglobin 10/12/2022 11.1 (A)  11.9 - 15.1 g/dL Final    Hematocrit 10/12/2022 34.5 (A)  36.3 - 47.1 % Final    MCV 10/12/2022 79.5 (A)  82.6 - 102.9 fL Final    MCH 10/12/2022 25.6  25.2 - 33.5 pg Final    MCHC 10/12/2022 32.2  25.2 - 33.5 g/dL Final    RDW 10/12/2022 19.1 (A)  11.8 - 14.4 % Final    Platelets 64/54/7400 239  138 - 453 k/uL Final    MPV 10/12/2022 10.1  8.1 - 13.5 fL Final    NRBC Automated 10/12/2022 0.0  0.0 per 100 WBC Final    Seg Neutrophils 10/12/2022 91 (A)  36 - 65 % Final    Lymphocytes 10/12/2022 7 (A)  24 - 43 % Final    Monocytes 10/12/2022 2 (A)  3 - 12 % Final    Eosinophils % 10/12/2022 0 (A)  1 - 4 % Final    Basophils 10/12/2022 0  0 - 2 % Final    Immature Granulocytes 10/12/2022 0  0 % Final    Segs Absolute 10/12/2022 9.76 (A)  1.50 - 8.10 k/uL Final    Absolute Lymph # 10/12/2022 0.77 (A)  1.10 - 3.70 k/uL Final    Absolute Mono # 10/12/2022 0.18  0.10 - 1.20 k/uL Final    Absolute Eos # 10/12/2022 <0.03  0.00 - 0.44 k/uL Final    Basophils Absolute 10/12/2022 <0.03  0.00 - 0.20 k/uL Final    Absolute Immature Granulocyte 10/12/2022 0.03  0.00 - 0.30 k/uL Final    RBC Morphology 10/12/2022 ANISOCYTOSIS PRESENT   Final    MICROCYTOSIS PRESENT    Glucose 10/12/2022 133 (A)  70 - 99 mg/dL Final    BUN 10/12/2022 9  6 - 20 mg/dL Final    Creatinine 10/12/2022 0.49 (A)  0.50 - 0.90 mg/dL Final    Est, Glom Filt Rate 10/12/2022 >60  >60 mL/min/1.73m2 Final    Comment:       Effective Oct 3, 2022        These results are not intended for use in patients <25years of age. eGFR results are calculated without a race factor using the 2021 CKD-EPI equation. Careful clinical correlation is recommended, particularly when comparing to results   calculated using previous equations. The CKD-EPI equation is less accurate in patients with extremes of muscle mass, extra-renal   metabolism of creatine, excessive creatine ingestion, or following therapy that affects   renal tubular secretion. Bun/Cre Ratio 10/12/2022 18  9 - 20 Final    Calcium 10/12/2022 8.9  8.6 - 10.4 mg/dL Final    Sodium 10/12/2022 139  135 - 144 mmol/L Final    Potassium 10/12/2022 4.9  3.7 - 5.3 mmol/L Final    Chloride 10/12/2022 104  98 - 107 mmol/L Final    CO2 10/12/2022 29  20 - 31 mmol/L Final    Anion Gap 10/12/2022 6 (A)  9 - 17 mmol/L Final    Alkaline Phosphatase 10/12/2022 103  35 - 104 U/L Final    ALT 10/12/2022 8  5 - 33 U/L Final    AST 10/12/2022 16  <32 U/L Final    Total Bilirubin 10/12/2022 0.6  0.3 - 1.2 mg/dL Final    Total Protein 10/12/2022 6.3 (A)  6.4 - 8.3 g/dL Final    Albumin 10/12/2022 3.8  3.5 - 5.2 g/dL Final    Albumin/Globulin Ratio 10/12/2022 1.5  1.0 - 2.5 Final    Magnesium 10/12/2022 1.9  1.6 - 2.6 mg/dL Final    Hemoglobin 10/12/2022 10.3 (A)  11.9 - 15.1 g/dL Final    Hematocrit 10/12/2022 32.3 (A)  36.3 - 47.1 % Final    Surgical Pathology Report 10/12/2022    Final                    Value:-- Diagnosis --  A. RIGHT OVARY AND FALLOPIAN TUBE, SALPINGO-OOPHORECTOMY:       -  OVARY:  CYSTIC FOLLICLES AND CORPORA ALBICANTIA            -  FALLOPIAN TUBE: SEGMENT OF FALLOPIAN TUBE WITH PARATUBAL  CYST.       -  NEGATIVE FOR ATYPIA OR MALIGNANCY. B.  LEFT OVARY AND FALLOPIAN TUBE, SALPINGO-OOPHORECTOMY:            -  ENDOMETRIOMA INVOLVING OVARY, FALLOPIAN TUBE AND  PERITUBAL SOFT TISSUE.       -  NEGATIVE FOR ATYPIA OR MALIGNANCY.       C.  UTERUS WITH ATTACHED CERVIX, HYSTERECTOMY:            -  CERVIX: ACUTE AND CHRONIC CERVICITIS WITH MESONEPHRIC  DUCTAL HYPERPLASIA. NEGATIVE FOR DYSPLASIA. -  ENDOMYOMETRIUM:                  -  PATCHY MUCOSAL ATROPHY AND SUBMUCOSAL ELASTOSIS,  CONSISTENT WITH PREVIOUS ABLATION. -  FOCAL RESIDUAL PROLIFERATIVE PHASE ENDOMETRIUM (CORNU). -  ADENOMYOSIS.            -  NEGATIVE FOR HYPERPLASIA OR ATYPIA. [place] Romayne Medin, M.D.  **Electronically Signed Out**         jet/10/14/2022       Clinical Informat                          ion  Pre-op Diagnosis:  ANEMIA, UNSPECIFIED TYPE, HISTORY OF ENDOMETRIAL  ABLATION, MENORRHAGIA WITH REGULAR CYCLE, CYST OF LEFT OVARY, PELVIC  PAIN IN FEMALE  Operative Findings:  RIGHT OVARY AND TUBE; LEFT OVARY, TUBE AND  ENDOMETRIOMA; UTERUS AND CERVIX  Operation Performed:  TOTAL ABDOMINAL HYSTERECTOMY, BILATERAL  SALPINGO-OOPHORECTOMY, POSSIBLE ENTEROCELE REPAIR    Source of Specimen  A: RIGHT OVARY AND TUBE  B: LEFT OVARY, TUBE AND ENDOMETRIUM  C: UTERUS AND CERVIX    Gross Description  A. \"ELIZABETH VASQUES, RIGHT OVARY AND TUBE\" 14 gram, 4.5 x 2.5 x 1.0 cm  ovary with an attached 6.0 cm long x 1.0 cm in diameter fimbriated  fallopian tube segment. The serosa is pink-tan and there is a 1.2 cm  paratubal cyst.  The lumen is unremarkable. The external surface of  the ovary is yellow-tan and cerebriform. Sectioning reveals a rubbery  tan cut surface with a few unilocular cysts up to 1.1 cm that display  no excrescences. Representative sections 3cs, fimbriated end in  entirety. B. \"ELIZABETH VASQUES, LEFT OVARY, TUBE AND ENDOMETRIOMA\" 29 gram, 4.5 x  3.5 x 3.3 cm disrupted ovary with 6.5 cm long x 2.5 cm in diameter  torturous tube. No fimbriae are identified and the distal end is  disrupted. The distal end is shaggy and this region may represent  tube lining or possible fimbriae. The remaining lumen is markedly  dilated.   The external surfaces of the ovary are gray-tan and the  disrupted area is consistent with an empty unilocular space that has a  smooth tan-white lining. The remaining cut surfaces are rubbery  pink-tan with cystic areas that contain blood. There are no  excrescences. Representative sections 6cs. C. \"ELIZABETH VASQUES, UTERUS\" Uterus with attached cervix     Dimensions:  Uterus and cervix 11.8 x 7.5 x 5.8 cm. Weight:  Uterus and cervix 160 grams. Serosa:  Pink-tan with a few adhesions. Cervix:  4.7 x 4.6 x 4.5 cm and the ectocervix is slightly mottled. The os is patent. The lower uterine segment is stenotic. Endometrium:  The cavi                          ty is approximately 4.5 x 3.0 cm and is mostly  retracted and not patent consistent with previous ablation. Near the  cornua, uninvolved endometrium is identified that is pink-tan surface  and 0.1 cm in thickness. Myometrium:  The myometrium has a maximum thickness of 3.5 cm and is  trabecular with areas of pink-tan trabeculation that are fairly  well-circumscribed and are up to 3.5 cm. Tubes/ovaries:  See parts A and B above. Cassette summary:  \"1\" anterior cervix, \"2\" posterior cervix, \"3-6\"  endomyometrium. tm      Microscopic Description  A-C. Microscopic examination performed. SURGICAL PATHOLOGY CONSULTATION       Patient Name: Dagmar Valenzuela Med Rec: [id number]  Path Number: [id number]    [facility]  [facility]  ANATOMIC PATHOLOGY  [address] [place] [address]. [place], [address]  [phone]  Fax: [phone]      Glucose 10/13/2022 125 (A)  70 - 99 mg/dL Final    BUN 10/13/2022 7  6 - 20 mg/dL Final    Creatinine 10/13/2022 0.55  0.50 - 0.90 mg/dL Final    Est, Glom Filt Rate 10/13/2022 >60  >60 mL/min/1.73m2 Final    Comment:       Effective Oct 3, 2022        These results are not intended for use in patients <25years of age. eGFR results are calculated without a race factor using the 2021 CKD-EPI equation.   Careful clinical correlation is recommended, particularly when comparing to results   calculated using previous equations. The CKD-EPI equation is less accurate in patients with extremes of muscle mass, extra-renal   metabolism of creatine, excessive creatine ingestion, or following therapy that affects   renal tubular secretion.       Bun/Cre Ratio 10/13/2022 13  9 - 20 Final    Calcium 10/13/2022 8.7  8.6 - 10.4 mg/dL Final    Sodium 10/13/2022 137  135 - 144 mmol/L Final    Potassium 10/13/2022 4.4  3.7 - 5.3 mmol/L Final    Chloride 10/13/2022 105  98 - 107 mmol/L Final    CO2 10/13/2022 25  20 - 31 mmol/L Final    Anion Gap 10/13/2022 7 (A)  9 - 17 mmol/L Final    WBC 10/13/2022 10.7  3.5 - 11.3 k/uL Final    RBC 10/13/2022 3.54 (A)  3.95 - 5.11 m/uL Final    Hemoglobin 10/13/2022 9.0 (A)  11.9 - 15.1 g/dL Final    Hematocrit 10/13/2022 27.9 (A)  36.3 - 47.1 % Final    MCV 10/13/2022 78.8 (A)  82.6 - 102.9 fL Final    MCH 10/13/2022 25.4  25.2 - 33.5 pg Final    MCHC 10/13/2022 32.3  25.2 - 33.5 g/dL Final    RDW 10/13/2022 19.1 (A)  11.8 - 14.4 % Final    Platelets 98/57/3550 225  138 - 453 k/uL Final    MPV 10/13/2022 10.5  8.1 - 13.5 fL Final    NRBC Automated 10/13/2022 0.0  0.0 per 100 WBC Final    RBC Morphology 10/13/2022 ANISOCYTOSIS PRESENT   Final    MICROCYTOSIS PRESENT    Seg Neutrophils 10/13/2022 84 (A)  36 - 65 % Final    Lymphocytes 10/13/2022 9 (A)  24 - 43 % Final    Monocytes 10/13/2022 6  3 - 12 % Final    Eosinophils % 10/13/2022 0 (A)  1 - 4 % Final    Basophils 10/13/2022 0  0 - 2 % Final    Immature Granulocytes 10/13/2022 1 (A)  0 % Final    Segs Absolute 10/13/2022 8.94 (A)  1.50 - 8.10 k/uL Final    Absolute Lymph # 10/13/2022 0.98 (A)  1.10 - 3.70 k/uL Final    Absolute Mono # 10/13/2022 0.68  0.10 - 1.20 k/uL Final    Absolute Eos # 10/13/2022 <0.03  0.00 - 0.44 k/uL Final    Basophils Absolute 10/13/2022 <0.03  0.00 - 0.20 k/uL Final    Absolute Immature Granulocyte 10/13/2022 0.05  0.00 - 0.30 k/uL Final    Hemoglobin 10/13/2022 9.6 (A)  11.9 - 15.1 g/dL Final    Hematocrit 10/13/2022 30.3 (A)  36.3 - 47.1 % Final    Glucose 10/14/2022 103 (A)  70 - 99 mg/dL Final    BUN 10/14/2022 8  6 - 20 mg/dL Final    Creatinine 10/14/2022 0.52  0.50 - 0.90 mg/dL Final    Est, Glom Filt Rate 10/14/2022 >60  >60 mL/min/1.73m2 Final    Comment:       Effective Oct 3, 2022        These results are not intended for use in patients <25years of age. eGFR results are calculated without a race factor using the 2021 CKD-EPI equation. Careful clinical correlation is recommended, particularly when comparing to results   calculated using previous equations. The CKD-EPI equation is less accurate in patients with extremes of muscle mass, extra-renal   metabolism of creatine, excessive creatine ingestion, or following therapy that affects   renal tubular secretion.       Bun/Cre Ratio 10/14/2022 15  9 - 20 Final    Calcium 10/14/2022 8.4 (A)  8.6 - 10.4 mg/dL Final    Sodium 10/14/2022 141  135 - 144 mmol/L Final    Potassium 10/14/2022 3.9  3.7 - 5.3 mmol/L Final    Chloride 10/14/2022 106  98 - 107 mmol/L Final    CO2 10/14/2022 24  20 - 31 mmol/L Final    Anion Gap 10/14/2022 11  9 - 17 mmol/L Final    WBC 10/14/2022 6.9  3.5 - 11.3 k/uL Final    RBC 10/14/2022 3.68 (A)  3.95 - 5.11 m/uL Final    Hemoglobin 10/14/2022 9.3 (A)  11.9 - 15.1 g/dL Final    Hematocrit 10/14/2022 28.9 (A)  36.3 - 47.1 % Final    MCV 10/14/2022 78.5 (A)  82.6 - 102.9 fL Final    MCH 10/14/2022 25.3  25.2 - 33.5 pg Final    MCHC 10/14/2022 32.2  25.2 - 33.5 g/dL Final    RDW 10/14/2022 19.5 (A)  11.8 - 14.4 % Final    Platelets 79/56/6536 233  138 - 453 k/uL Final    MPV 10/14/2022 10.8  8.1 - 13.5 fL Final    NRBC Automated 10/14/2022 0.0  0.0 per 100 WBC Final    RBC Morphology 10/14/2022 ANISOCYTOSIS PRESENT   Final    MICROCYTOSIS PRESENT    Seg Neutrophils 10/14/2022 78 (A)  36 - 65 % Final    Lymphocytes 10/14/2022 14 (A)  24 - 43 % Final    Monocytes 10/14/2022 6  3 - 12 % Final    Eosinophils % 10/14/2022 1  1 - 4 % Final    Basophils 10/14/2022 0  0 - 2 % Final    Immature Granulocytes 10/14/2022 1 (A)  0 % Final    Segs Absolute 10/14/2022 5.38  1.50 - 8.10 k/uL Final    Absolute Lymph # 10/14/2022 0.95 (A)  1.10 - 3.70 k/uL Final    Absolute Mono # 10/14/2022 0.43  0.10 - 1.20 k/uL Final    Absolute Eos # 10/14/2022 0.04  0.00 - 0.44 k/uL Final    Basophils Absolute 10/14/2022 0.03  0.00 - 0.20 k/uL Final    Absolute Immature Granulocyte 10/14/2022 0.06  0.00 - 0.30 k/uL Final   ]      Treatments:  IV hydration, antibiotics: Ancef, analgesia: motrin/ percocet, and surgery: LESA-BSO    Disposition: home    Condition: good          Patient Instructions/Discharge Information:    FORMAL SPECIFIC DISCHARGE INSTRUCTIONS CAN BE FOUND UNDER THE SPECIFIC SECTION IN THE NAVIGATOR. Activity: see discharge instruction section    Diet: regular diet    Wound Care: keep wound clean and dry        Follow up in 2 weeks     10/14/2022 11:03 AM    Jada Power DO      Comments: The patient is to RTO of her surgeon in 2 weeks. She is to refrain from intercourse, Baths, Pools, Lakes, [employer] and Lifting any more than 10 lbs. She is to report any temperature over 100.4 F, worsening abdominal pain, or increased bleeding or drainage from her incision. Cc: No primary care provider on file. Discharge instructions and precautions were discussed with the patient, who voiced understanding and agreed with recommendations and follow-up. All questions were answered. Written instructions were also provided at discharge.

## 2022-12-22 ENCOUNTER — TELEMEDICINE (OUTPATIENT)
Dept: FAMILY MEDICINE CLINIC | Age: 52
End: 2022-12-22
Payer: COMMERCIAL

## 2022-12-22 DIAGNOSIS — H66.009 ACUTE SUPPURATIVE OTITIS MEDIA WITHOUT SPONTANEOUS RUPTURE OF EAR DRUM, RECURRENCE NOT SPECIFIED, UNSPECIFIED LATERALITY: ICD-10-CM

## 2022-12-22 DIAGNOSIS — H66.009 ACUTE SUPPURATIVE OTITIS MEDIA WITHOUT SPONTANEOUS RUPTURE OF EAR DRUM, RECURRENCE NOT SPECIFIED, UNSPECIFIED LATERALITY: Primary | ICD-10-CM

## 2022-12-22 PROCEDURE — 99213 OFFICE O/P EST LOW 20 MIN: CPT | Performed by: NURSE PRACTITIONER

## 2022-12-22 RX ORDER — AMOXICILLIN AND CLAVULANATE POTASSIUM 875; 125 MG/1; MG/1
1 TABLET, FILM COATED ORAL 2 TIMES DAILY
Qty: 14 TABLET | Refills: 0 | Status: SHIPPED | OUTPATIENT
Start: 2022-12-22 | End: 2022-12-29

## 2022-12-22 RX ORDER — FLUCONAZOLE 150 MG/1
150 TABLET ORAL ONCE
Qty: 1 TABLET | Refills: 0 | Status: SHIPPED | OUTPATIENT
Start: 2022-12-22 | End: 2022-12-22

## 2022-12-22 ASSESSMENT — PATIENT HEALTH QUESTIONNAIRE - PHQ9
SUM OF ALL RESPONSES TO PHQ QUESTIONS 1-9: 0
SUM OF ALL RESPONSES TO PHQ QUESTIONS 1-9: 0
1. LITTLE INTEREST OR PLEASURE IN DOING THINGS: 0
SUM OF ALL RESPONSES TO PHQ QUESTIONS 1-9: 0
SUM OF ALL RESPONSES TO PHQ QUESTIONS 1-9: 0
SUM OF ALL RESPONSES TO PHQ9 QUESTIONS 1 & 2: 0
2. FEELING DOWN, DEPRESSED OR HOPELESS: 0

## 2022-12-22 NOTE — PROGRESS NOTES
Subjective:      Patient ID: Carlita Cardenas is a 46 y.o. female coming in for   Chief Complaint   Patient presents with    Pharyngitis     Sore throat, ear pain, congestion, runny nose. Taking OTC cold medication. Body aches. No known fever. Negative at home COVID. Carlita Cardenas, was evaluated through a synchronous (real-time) audio-video encounter. The patient (or guardian if applicable) is aware that this is a billable service, which includes applicable co-pays. This Virtual Visit was conducted with patient's (and/or legal guardian's) consent. The visit was conducted pursuant to the emergency declaration under the [facility], [address] [place] [address] authority and the [employer] Act. Patient identification was verified, and a caregiver was present when appropriate. The patient was located in a state where the provider was licensed to provide care. HPI  URI symptoms started on 12/19/22 with scratchy throat and dry heeves. Symptoms then progressed into nasal congestion, body aches, bilateral ear pain/fullness. Pt does have past hx of ruptured tm due to URI. Review of Systems     Objective:   Physical Exam  Vitals and nursing note reviewed. Constitutional:       Appearance: Normal appearance. HENT:      Nose: Congestion present. Pulmonary:      Effort: Pulmonary effort is normal.   Neurological:      General: No focal deficit present. Mental Status: She is alert and oriented to person, place, and time. Assessment:      1. Acute suppurative otitis media without spontaneous rupture of ear drum, recurrence not specified, unspecified laterality           Plan: Without being able to examine pt I cannot truly tell if she has an ear infection. With her past hx of ruptured TM, pt wanting a script for atb to take if her ear pain/fullness continues to worsen.    Scripts sent in, if symptoms worsen/persist, call office  Continue with otc supportive meds. No orders of the defined types were placed in this encounter. Outpatient Encounter Medications as of 12/22/2022   Medication Sig Dispense Refill    amoxicillin-clavulanate (AUGMENTIN) 875-125 MG per tablet Take 1 tablet by mouth 2 times daily for 7 days 14 tablet 0    fluconazole (DIFLUCAN) 150 MG tablet Take 1 tablet by mouth once for 1 dose 1 tablet 0    [DISCONTINUED] ibuprofen (ADVIL;MOTRIN) 600 MG tablet Take 1 tablet by mouth every 6 hours as needed for Pain (Patient not taking: Reported on 12/22/2022) 30 tablet 2    [DISCONTINUED] ferrous sulfate (IRON 325) 325 (65 Fe) MG tablet Take 325 mg by mouth in the morning and 325 mg in the evening. (Patient not taking: No sig reported)       No facility-administered encounter medications on file as of 12/22/2022.             Flores Armenta, APRN - CNP

## 2022-12-23 RX ORDER — FLUCONAZOLE 150 MG/1
150 TABLET ORAL ONCE
Qty: 1 TABLET | Refills: 0 | OUTPATIENT
Start: 2022-12-23 | End: 2022-12-23

## 2022-12-23 RX ORDER — AMOXICILLIN AND CLAVULANATE POTASSIUM 875; 125 MG/1; MG/1
1 TABLET, FILM COATED ORAL 2 TIMES DAILY
Qty: 14 TABLET | Refills: 0 | OUTPATIENT
Start: 2022-12-23 | End: 2022-12-30

## 2023-01-05 ENCOUNTER — OFFICE VISIT (OUTPATIENT)
Dept: PRIMARY CARE CLINIC | Age: 53
End: 2023-01-05
Payer: COMMERCIAL

## 2023-01-05 VITALS
DIASTOLIC BLOOD PRESSURE: 62 MMHG | WEIGHT: 191 LBS | SYSTOLIC BLOOD PRESSURE: 120 MMHG | TEMPERATURE: 100.4 F | HEART RATE: 88 BPM | BODY MASS INDEX: 32.61 KG/M2 | HEIGHT: 64 IN | RESPIRATION RATE: 14 BRPM | OXYGEN SATURATION: 98 %

## 2023-01-05 DIAGNOSIS — R50.9 FEVER IN ADULT: ICD-10-CM

## 2023-01-05 DIAGNOSIS — J10.1 INFLUENZA A: Primary | ICD-10-CM

## 2023-01-05 LAB
INFLUENZA A ANTIBODY: POSITIVE
INFLUENZA B ANTIBODY: NEGATIVE

## 2023-01-05 PROCEDURE — 99213 OFFICE O/P EST LOW 20 MIN: CPT | Performed by: FAMILY MEDICINE

## 2023-01-05 PROCEDURE — 87804 INFLUENZA ASSAY W/OPTIC: CPT | Performed by: FAMILY MEDICINE

## 2023-01-05 RX ORDER — BENZONATATE 100 MG/1
100 CAPSULE ORAL 3 TIMES DAILY PRN
Qty: 30 CAPSULE | Refills: 0 | Status: SHIPPED | OUTPATIENT
Start: 2023-01-05 | End: 2023-01-12

## 2023-01-05 RX ORDER — DEXTROMETHORPHAN HYDROBROMIDE AND PROMETHAZINE HYDROCHLORIDE 15; 6.25 MG/5ML; MG/5ML
10 SYRUP ORAL 4 TIMES DAILY PRN
Qty: 120 ML | Refills: 1 | Status: SHIPPED | OUTPATIENT
Start: 2023-01-05 | End: 2023-01-12

## 2023-01-05 SDOH — ECONOMIC STABILITY: FOOD INSECURITY: WITHIN THE PAST 12 MONTHS, YOU WORRIED THAT YOUR FOOD WOULD RUN OUT BEFORE YOU GOT MONEY TO BUY MORE.: NEVER TRUE

## 2023-01-05 SDOH — ECONOMIC STABILITY: FOOD INSECURITY: WITHIN THE PAST 12 MONTHS, THE FOOD YOU BOUGHT JUST DIDN'T LAST AND YOU DIDN'T HAVE MONEY TO GET MORE.: NEVER TRUE

## 2023-01-05 ASSESSMENT — PATIENT HEALTH QUESTIONNAIRE - PHQ9
SUM OF ALL RESPONSES TO PHQ9 QUESTIONS 1 & 2: 0
2. FEELING DOWN, DEPRESSED OR HOPELESS: 0
SUM OF ALL RESPONSES TO PHQ QUESTIONS 1-9: 0
SUM OF ALL RESPONSES TO PHQ QUESTIONS 1-9: 0
1. LITTLE INTEREST OR PLEASURE IN DOING THINGS: 0
SUM OF ALL RESPONSES TO PHQ QUESTIONS 1-9: 0
SUM OF ALL RESPONSES TO PHQ QUESTIONS 1-9: 0

## 2023-01-05 ASSESSMENT — ENCOUNTER SYMPTOMS
SINUS PRESSURE: 1
COUGH: 1
DIARRHEA: 0
NAUSEA: 0
CONSTIPATION: 0
RHINORRHEA: 0
TROUBLE SWALLOWING: 0
SORE THROAT: 0
CHOKING: 0
SHORTNESS OF BREATH: 1
CHEST TIGHTNESS: 0
WHEEZING: 0

## 2023-01-05 ASSESSMENT — SOCIAL DETERMINANTS OF HEALTH (SDOH): HOW HARD IS IT FOR YOU TO PAY FOR THE VERY BASICS LIKE FOOD, HOUSING, MEDICAL CARE, AND HEATING?: NOT HARD AT ALL

## 2023-01-05 NOTE — PROGRESS NOTES
[facility] [address] [place] [address] [address]  [address] [address]  Dept: [phone]  Dept Fax: [phone]    Sergio Daniels is a 46 y.o. female who presents today for her medical conditions/complaints as notedbelow. Sergio Daniels is c/o of   Chief Complaint   Patient presents with    URI     Symptoms starting Saturday including Cough non-productive, chills, bilat ear pain. Pt exposed to Flu A this weekend. Pt using nyquil, motrin and mucinex max for symptoms which help some. HPI:     Here today for a cough. Cough  This is a new problem. The current episode started in the past 7 days (5 days). The problem has been gradually worsening. The problem occurs every few minutes. The cough is Non-productive. Associated symptoms include ear congestion, ear pain, a fever, myalgias, nasal congestion and shortness of breath. Pertinent negatives include no chest pain, chills, headaches, postnasal drip, rash, rhinorrhea, sore throat or wheezing. The symptoms are aggravated by lying down and exercise. Treatments tried: nyquil, mucinex, nsaids. The treatment provided mild relief. There is no history of asthma, COPD or environmental allergies.        Past Medical History:   Diagnosis Date    Anemia     Anemia     Chronic kidney disease     kidney stones    Endometriosis     Headache     History of blood transfusion     Hyperlipidemia     Obesity     Had Gastric Bypass 2003          Social History     Tobacco Use    Smoking status: Never    Smokeless tobacco: Never   Substance Use Topics    Alcohol use: No     Current Outpatient Medications   Medication Sig Dispense Refill    benzonatate (TESSALON) 100 MG capsule Take 1 capsule by mouth 3 times daily as needed for Cough 30 capsule 0    promethazine-dextromethorphan (PROMETHAZINE-DM) 6.25-15 MG/5ML syrup Take 10 mLs by mouth 4 times daily as needed for Cough 120 mL 1     No current facility-administered medications for this visit. No Known Allergies    Subjective:     Review of Systems   Constitutional:  Positive for fatigue and fever. Negative for activity change, appetite change and chills. HENT:  Positive for congestion, ear pain and sinus pressure. Negative for postnasal drip, rhinorrhea, sneezing, sore throat and trouble swallowing. Eyes:  Negative for visual disturbance. Respiratory:  Positive for cough and shortness of breath. Negative for choking, chest tightness and wheezing. Cardiovascular:  Negative for chest pain, palpitations and leg swelling. Gastrointestinal:  Negative for constipation, diarrhea and nausea. Musculoskeletal:  Positive for myalgias. Skin:  Negative for rash. Allergic/Immunologic: Negative for environmental allergies. Neurological:  Negative for headaches. Objective:      Physical Exam  Vitals and nursing note reviewed. Constitutional:       General: She is not in acute distress. Appearance: She is well-developed. HENT:      Right Ear: Tympanic membrane, ear canal and external ear normal.      Left Ear: Tympanic membrane, ear canal and external ear normal.      Nose: Mucosal edema present. Right Sinus: No maxillary sinus tenderness or frontal sinus tenderness. Left Sinus: No maxillary sinus tenderness or frontal sinus tenderness. Mouth/Throat:      Pharynx: No oropharyngeal exudate. Eyes:      Conjunctiva/sclera: Conjunctivae normal.   Cardiovascular:      Rate and Rhythm: Normal rate and regular rhythm. Heart sounds: Normal heart sounds. No murmur heard. Pulmonary:      Effort: Pulmonary effort is normal. No respiratory distress. Breath sounds: Normal breath sounds. No wheezing or rales. Musculoskeletal:      Cervical back: Normal range of motion and neck supple. Lymphadenopathy:      Cervical: No cervical adenopathy. Skin:     General: Skin is warm and dry. Findings: No rash. Neurological:      Mental Status: She is alert and oriented to person, place, and time. Psychiatric:         Behavior: Behavior normal.         Judgment: Judgment normal.     /62 (Site: Left Upper Arm, Position: Sitting, Cuff Size: Large Adult)   Pulse 88   Temp 100.4 °F (38 °C) (Tympanic)   Resp 14   Ht 5' 4\" (1.626 m)   Wt 191 lb (86.6 kg)   LMP 09/22/2022 (Approximate)   SpO2 98%   BMI 32.79 kg/m²     Assessment:       Diagnosis Orders   1. Influenza A        2. Fever in adult  POCT Influenza A/B         Rapid flu positive for influneza a       Plan:       URI: new; she has a viral respiratory infection so I recommended that she use mucinex to help with congestion and cough. I also recommended Flonase and an antihistamine for sinus symptoms. she was also encouraged to use tylenol or ibuprofen for fever. she was instructed to return if there is no improvement or symptoms worsen. Return if symptoms worsen or fail to improve. Orders Placed This Encounter   Procedures    POCT Influenza A/B     Orders Placed This Encounter   Medications    benzonatate (TESSALON) 100 MG capsule     Sig: Take 1 capsule by mouth 3 times daily as needed for Cough     Dispense:  30 capsule     Refill:  0    promethazine-dextromethorphan (PROMETHAZINE-DM) 6.25-15 MG/5ML syrup     Sig: Take 10 mLs by mouth 4 times daily as needed for Cough     Dispense:  120 mL     Refill:  1       Patientgiven educational materials - see patient instructions. Discussed use, benefit,and side effects of prescribed medications. All patient questions answered. Ptvoiced understanding. Reviewed health maintenance. Instructed to continue currentmedications, diet and exercise. Patient agreed with treatment plan. Follow up asdirected.      Electronically signed by Woody Hearn MD on 1/5/2023 at 9:10 AM

## 2023-03-13 ENCOUNTER — HOSPITAL ENCOUNTER (OUTPATIENT)
Age: 53
Setting detail: SPECIMEN
Discharge: HOME | End: 2023-03-13
Payer: COMMERCIAL

## 2023-03-13 ENCOUNTER — OFFICE VISIT (OUTPATIENT)
Dept: FAMILY MEDICINE CLINIC | Age: 53
End: 2023-03-13
Payer: COMMERCIAL

## 2023-03-13 VITALS
HEART RATE: 102 BPM | DIASTOLIC BLOOD PRESSURE: 74 MMHG | WEIGHT: 194 LBS | SYSTOLIC BLOOD PRESSURE: 108 MMHG | HEIGHT: 64 IN | RESPIRATION RATE: 18 BRPM | OXYGEN SATURATION: 97 % | BODY MASS INDEX: 33.12 KG/M2

## 2023-03-13 DIAGNOSIS — N89.8 VAGINAL ITCHING: ICD-10-CM

## 2023-03-13 DIAGNOSIS — M67.441 GANGLION CYST OF JOINT OF FINGER OF RIGHT HAND: Primary | ICD-10-CM

## 2023-03-13 LAB
BACTERIA: ABNORMAL
BILIRUBIN URINE: NEGATIVE
COLOR: YELLOW
EPITHELIAL CELLS UA: ABNORMAL /HPF (ref 0–5)
GLUCOSE UR STRIP.AUTO-MCNC: NEGATIVE MG/DL
KETONES UR STRIP.AUTO-MCNC: NEGATIVE MG/DL
LEUKOCYTE ESTERASE UR QL STRIP.AUTO: ABNORMAL
NITRITE UR QL STRIP.AUTO: NEGATIVE
OTHER OBSERVATIONS UA: ABNORMAL
PROT UR STRIP.AUTO-MCNC: 6 MG/DL (ref 5–6)
PROT UR STRIP.AUTO-MCNC: NEGATIVE MG/DL
RBC CLUMPS #/AREA URNS AUTO: ABNORMAL /HPF (ref 0–4)
SPECIFIC GRAVITY UA: 1.02 (ref 1.01–1.02)
TURBIDITY: CLEAR
URINE HGB: ABNORMAL
UROBILINOGEN, URINE: NORMAL
WBC UA: ABNORMAL /HPF (ref 0–4)

## 2023-03-13 PROCEDURE — 81001 URINALYSIS AUTO W/SCOPE: CPT

## 2023-03-13 PROCEDURE — 99214 OFFICE O/P EST MOD 30 MIN: CPT | Performed by: NURSE PRACTITIONER

## 2023-03-13 RX ORDER — FLUCONAZOLE 100 MG/1
TABLET ORAL
Qty: 2 TABLET | Refills: 0 | Status: SHIPPED | OUTPATIENT
Start: 2023-03-13

## 2023-03-13 ASSESSMENT — PATIENT HEALTH QUESTIONNAIRE - PHQ9
SUM OF ALL RESPONSES TO PHQ QUESTIONS 1-9: 0
SUM OF ALL RESPONSES TO PHQ9 QUESTIONS 1 & 2: 0
SUM OF ALL RESPONSES TO PHQ QUESTIONS 1-9: 0
2. FEELING DOWN, DEPRESSED OR HOPELESS: 0
1. LITTLE INTEREST OR PLEASURE IN DOING THINGS: 0

## 2023-03-13 NOTE — PROGRESS NOTES
Subjective:      Patient ID: Carlita Cardenas is a 46 y.o. female coming in for   Chief Complaint   Patient presents with    Skin Lesion     Right hand, index finger. Mass/lump x 2 months. Has grown slightly. Tender when bumped. No drainage. No redness. No known injury        HPI  Bump to dorsal aspect of right index finger at the DIP joint. No injury. Has slowly gotten bigger over past 2 months. 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 Has not tried anything for it. Review of Systems   Genitourinary:  Negative for dysuria, vaginal discharge and vaginal pain (reports some vaginal itching/ mild drainage). Objective:/74   Pulse (!) 102   Resp 18   Ht 5' 4\" (1.626 m)   Wt 194 lb (88 kg)   LMP 09/22/2022 (Approximate)   SpO2 97%   BMI 33.30 kg/m²      Physical Exam  Vitals and nursing note reviewed. Constitutional:       Appearance: Normal appearance. Pulmonary:      Effort: Pulmonary effort is normal.   Musculoskeletal:        Arms:    Skin:     General: Skin is warm and dry. Neurological:      General: No focal deficit present. Mental Status: She is alert and oriented to person, place, and time. Assessment:      1. Ganglion cyst of joint of finger of right hand    2. Vaginal itching           Plan:    -ortho referral placed for ganglion cyst  -will check urine to rule out UTI  -diflucan for vaginal irritation. Orders Placed This Encounter   Procedures    Urinalysis with Reflex to Culture     Standing Status:   Future     Number of Occurrences:   1     Standing Expiration Date:   3/13/2024     Order Specific Question:   SPECIFY(EX-CATH,MIDSTREAM,CYSTO,ETC)?      Answer:   Elizabeth Lerma MD, Orthopedic Surgery, [place]     Referral Priority:   Routine     Referral Type:   Eval and Treat     Referral Reason:   Specialty Services Required     Referred to Provider:   Belgica Luo MD     Requested Specialty:   Orthopedic Surgery     Number of Visits Requested:   1      Outpatient Encounter Medications as of 3/13/2023   Medication Sig Dispense Refill    fluconazole (DIFLUCAN) 100 MG tablet Take one tablet now, may repeat in 72hrs if needed 2 tablet 0     No facility-administered encounter medications on file as of 3/13/2023.             Maria R Nolan, APRN - CNP

## 2023-03-21 DIAGNOSIS — M79.641 RIGHT HAND PAIN: Primary | ICD-10-CM

## 2023-04-04 ENCOUNTER — OFFICE VISIT (OUTPATIENT)
Dept: ORTHOPEDIC SURGERY | Age: 53
End: 2023-04-04
Payer: COMMERCIAL

## 2023-04-04 ENCOUNTER — HOSPITAL ENCOUNTER (OUTPATIENT)
Dept: GENERAL RADIOLOGY | Age: 53
Discharge: HOME | End: 2023-04-06
Payer: COMMERCIAL

## 2023-04-04 VITALS
HEART RATE: 76 BPM | DIASTOLIC BLOOD PRESSURE: 75 MMHG | WEIGHT: 194 LBS | HEIGHT: 64 IN | BODY MASS INDEX: 33.12 KG/M2 | SYSTOLIC BLOOD PRESSURE: 131 MMHG

## 2023-04-04 DIAGNOSIS — M79.641 RIGHT HAND PAIN: ICD-10-CM

## 2023-04-04 DIAGNOSIS — M67.449 GANGLION CYST OF FINGER: Primary | ICD-10-CM

## 2023-04-04 PROCEDURE — 99203 OFFICE O/P NEW LOW 30 MIN: CPT | Performed by: PHYSICIAN ASSISTANT

## 2023-04-04 PROCEDURE — 73130 X-RAY EXAM OF HAND: CPT

## 2023-04-04 NOTE — H&P
History and Physical        CHIEF COMPLAINT: Right index finger cyst    HISTORY OF PRESENT ILLNESS:      The patient is a 46 y.o. female  who presents with a right index finger cyst.  She has noted this for several months. Its been increasing in size. She states that she does a lot of work with her hands including typing and baking. She is right-hand dominant. This ganglion formed on the dorsal aspect. It is over the DIP joint. It is tender to touch. It seems to cause her some difficulty with motion. And performing her job. 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 We do feel as though this patient would benefit from right index finger dorsal ganglion excision under local anesthetic.     Past Medical History:    Past Medical History:   Diagnosis Date    Anemia     Anemia     Chronic kidney disease     kidney stones    Endometriosis     Headache     History of blood transfusion     Hyperlipidemia     Obesity     Had Gastric Bypass 2003       Past Surgical History:    Past Surgical History:   Procedure Laterality Date    APPENDECTOMY      CHOLECYSTECTOMY      DILATION AND CURETTAGE  12/2010    Endometrial Ablation    ENDOMETRIAL ABLATION  12/2010    GASTRIC BYPASS SURGERY  2003    HERNIA REPAIR  2003    HYSTERECTOMY, TOTAL ABDOMINAL (CERVIX REMOVED) N/A 10/12/2022    Total Abdominal Hysterectomy Bilateral Salpingoopherectomy & Oopherectomy, enterocele repair, & lysis of adhesions performed by Edwige Calvo DO at [address]  12/2010    with Endometrial Ablation     LAPAROSCOPY      LITHOTRIPSY Bilateral     OVARIAN CYST REMOVAL Left 10/09/2019    Laparoscopic Left Ovarian Cystectomy performed by Nida Allen MD at [place] [address]         Medications Prior to Admission:   Prior to Admission medications    Not on File    Scheduled Meds:  Continuous Infusions:  PRN

## 2023-05-10 ENCOUNTER — OFFICE VISIT (OUTPATIENT)
Dept: PRIMARY CARE CLINIC | Age: 53
End: 2023-05-10
Payer: COMMERCIAL

## 2023-05-10 VITALS
TEMPERATURE: 100.2 F | HEIGHT: 64 IN | HEART RATE: 107 BPM | BODY MASS INDEX: 33.99 KG/M2 | WEIGHT: 199.13 LBS | RESPIRATION RATE: 20 BRPM | OXYGEN SATURATION: 96 % | SYSTOLIC BLOOD PRESSURE: 108 MMHG | DIASTOLIC BLOOD PRESSURE: 64 MMHG

## 2023-05-10 DIAGNOSIS — J40 BRONCHITIS: Primary | ICD-10-CM

## 2023-05-10 DIAGNOSIS — R05.9 COUGH, UNSPECIFIED TYPE: ICD-10-CM

## 2023-05-10 DIAGNOSIS — R50.9 FEVER, UNSPECIFIED FEVER CAUSE: ICD-10-CM

## 2023-05-10 LAB
INFLUENZA A ANTIGEN, POC: NEGATIVE
INFLUENZA B ANTIGEN, POC: NEGATIVE
LOT EXPIRE DATE: NORMAL
LOT KIT NUMBER: NORMAL
SARS-COV-2, POC: NORMAL
VALID INTERNAL CONTROL: NORMAL
VENDOR AND KIT NAME POC: NORMAL

## 2023-05-10 PROCEDURE — 99213 OFFICE O/P EST LOW 20 MIN: CPT | Performed by: NURSE PRACTITIONER

## 2023-05-10 PROCEDURE — 87428 SARSCOV & INF VIR A&B AG IA: CPT | Performed by: NURSE PRACTITIONER

## 2023-05-10 RX ORDER — AZITHROMYCIN 250 MG/1
TABLET, FILM COATED ORAL
Qty: 1 PACKET | Refills: 0 | Status: SHIPPED | OUTPATIENT
Start: 2023-05-10

## 2023-05-10 ASSESSMENT — ENCOUNTER SYMPTOMS
SHORTNESS OF BREATH: 0
SORE THROAT: 0
WHEEZING: 0
COUGH: 1
RHINORRHEA: 1
CHEST TIGHTNESS: 0

## 2023-05-10 NOTE — PROGRESS NOTES
[facility]             [address], [address]                        Telephone [phone]             Fax [phone]     Sarthak Rodriguez  1970  [facility]:[id number]   Date of visit:  5/10/2023    Subjective:    Sarthak Rodriguez is a 46 y.o.  female who presents to [facility] today (5/10/2023) for evaluation of:    Chief Complaint   Patient presents with    Cough     Started Friday last week, congestion, chills, body aches, taking dayquil       Cough  This is a new problem. The current episode started in the past 7 days (05/05/23). The problem has been gradually worsening. The problem occurs every few minutes. The cough is Non-productive. Associated symptoms include chills, a fever (during today's visit), nasal congestion, postnasal drip and rhinorrhea. Pertinent negatives include no chest pain, headaches, rash, sore throat, shortness of breath or wheezing. Associated symptoms comments: Body aches. Nothing aggravates the symptoms. Treatments tried: nyquil, ibuprofen, tessalon, dayquil. The treatment provided mild relief. She has the following problem list:  Patient Active Problem List   Diagnosis    Other B-complex deficiencies    Anemia    Cyst of left ovary    Pelvic pain in female    Menorrhagia with regular cycle    Acquired pelvic enterocele    Endometriosis of both pelvic sidewalls    Status post LESA-BSO    Postoperative state        Current medications are:  Current Outpatient Medications   Medication Sig Dispense Refill    azithromycin (ZITHROMAX Z-MONICO) 250 MG tablet Take 2 tablets (500 mg) on Day 1, and then take 1 tablet (250 mg) on days 2 through 5. 1 packet 0     No current facility-administered medications for this visit. She has No Known Allergies. .    She  reports that she has never smoked.  She has never used smokeless tobacco.      Objective:    Vitals:    05/10/23 0810   BP:

## 2023-05-17 DIAGNOSIS — B37.31 VAGINAL YEAST INFECTION: Primary | ICD-10-CM

## 2023-05-17 RX ORDER — FLUCONAZOLE 100 MG/1
TABLET ORAL
Qty: 2 TABLET | Refills: 0 | Status: SHIPPED | OUTPATIENT
Start: 2023-05-17

## 2023-07-20 NOTE — PROGRESS NOTES
1403- Pt called out, writer to room. Pt has small vaginal bleeding noted on inner thighs, pt cleaned with wipes et pad placed to monitor for bleeding. Dr. Brenda Kumar notified, pt stated \"having dizzy spells\", pt has history of anemia, H&H completed no new orders. Will continue to monitor, vitals as charted. [de-identified] : Bilateral knee\par \par Constitutional: \par The patient is healthy-appearing and in no apparent distress. \par \par Gait:\par The patient ambulates with a normal gait and no limp.\par \par Cardiovascular System: \par The capillary refill is less than 2 seconds. \par \par Skin: \par There are no skin abnormalities.\par \par Right Knee:\par  \par Bony Palpation: \par There is tenderness of the medial joint line. \par There is no tenderness of the lateral joint line.\par There is tenderness of the medial femoral chondyle.\par There is tenderness of the lateral femoral chondyle.\par There is no tenderness of the tibial tubercle.\par There is no tenderness of the superior patella.\par There is no tenderness of the inferior patella.\par There is tenderness of the medial patellar facet.\par There is tenderness of the lateral patellar facet.\par \par Soft Tissue Palpation: \par There is no tenderness of the medial retinaculum.\par There is no tenderness of the lateral retinaculum.\par There is no tenderness of the quadriceps tendon.\par There is no tenderness of the patella tendon.\par There is no tenderness of the ITB.\par There is no tenderness of the pes anserine.\par \par Active Range of Motion: \par The range of motion at the knee actively and passively is full. \par \par Special Tests: \par There is a negative Apley.\par There is a negative Steinmanns. \par There is a negative Lachman and Anterior Drawer.\par There is a negative Posterior Drawer.  \par There is no varus or valgus laxity.\par \par Strgnth:\par There is 5/5 hip flexion and 5/5 knee flexion and extension.  \par \par Left Knee:\par  \par Bony Palpation: \par There is tenderness of the medial joint line. \par There is no tenderness of the lateral joint line.\par There is tenderness of the medial femoral chondyle.\par There is tenderness of the lateral femoral chondyle.\par There is no tenderness of the tibial tubercle.\par There is no tenderness of the superior patella.\par There is no tenderness of the inferior patella.\par There is tenderness of the medial patellar facet.\par There is tenderness of the lateral patellar facet.\par \par Soft Tissue Palpation: \par There is no tenderness of the medial retinaculum.\par There is no tenderness of the lateral retinaculum.\par There is no tenderness of the quadriceps tendon.\par There is no tenderness of the patella tendon.\par There is no tenderness of the ITB.\par There is no tenderness of the pes anserine.\par \par Active Range of Motion: \par The range of motion at the knee actively and passively is full. \par \par Special Tests: \par There is a negative Apley.\par There is a negative Steinmanns. \par There is a negative Lachman and Anterior Drawer.\par There is a negative Posterior Drawer.  \par There is no varus or valgus laxity.\par \par Strength: \par There is 5/5 hip flexion and 5/5 knee flexion and extension.  \par \par Psychiatric: \par The patient demonstrates a normal mood and affect and is active and alert\par  [de-identified] : X-ray bilateral knee: There is moderate medial and moderate to severe patellofemoral arthritis

## 2023-11-28 ENCOUNTER — TELEPHONE (OUTPATIENT)
Dept: OBGYN | Age: 53
End: 2023-11-28

## 2023-11-28 NOTE — TELEPHONE ENCOUNTER
Called patient to schedule annual exam. Patient states \"my  retired and left me with no insurance so I'm trying to get that figured out. \" Patient states she will call office once insurance issue is resolved. No abnormal pap h/o last mammo done in march 2021 = birads 1     Closing encounter.

## (undated) DEVICE — KIT,ANTI FOG,W/SPONGE & FLUID,SOFT PACK: Brand: MEDLINE

## (undated) DEVICE — DRESSING FOAM W4XL10IN AG SIL ADH ANTIMIC POSTOP OPTIFOAM

## (undated) DEVICE — SUTURE VCRL SZ 3-0 L27IN ABSRB VLT L26MM SH 1/2 CIR J316H

## (undated) DEVICE — CONTAINER SPEC 4OZ POLYPR WHT SCR TOP PEEL OPN PCH FOR ASEP

## (undated) DEVICE — SUTURE VCRL SZ 2-0 L27IN ABSRB VLT L36MM CT-1 1/2 CIR J339H

## (undated) DEVICE — PACK,LAPAROTOMY,PK IV,AURORA: Brand: MEDLINE

## (undated) DEVICE — SPONGE LAP W18XL18IN WHT COT 4 PLY FLD STRUNG RADPQ DISP ST

## (undated) DEVICE — LAPAROSCOPIC GAS CONDITIONING DEVICE.: Brand: INSUFLOW

## (undated) DEVICE — GOWN,AURORA,NONREINFORCED,LARGE: Brand: MEDLINE

## (undated) DEVICE — SUTURE VCRL SZ 0 L27IN ABSRB VLT L26MM CT-2 1/2 CIR J334H

## (undated) DEVICE — PACK,AURORA,LAVH: Brand: MEDLINE

## (undated) DEVICE — TROCAR: Brand: KII SHIELDED BLADED DUAL PACK

## (undated) DEVICE — TOTAL TRAY, 16FR 10ML SIL FOLEY, URN: Brand: MEDLINE

## (undated) DEVICE — MARKER W/PRE PRINTED CUSTOM LABEL

## (undated) DEVICE — SUTURE VCRL SZ 0 L36IN ABSRB UD L36MM CT-1 1/2 CIR J946H

## (undated) DEVICE — 20 ML SYRINGE LUER-LOCK TIP: Brand: MONOJECT

## (undated) DEVICE — TROCAR: Brand: KII SHIELDED BLADED ACCESS SYSTEM

## (undated) DEVICE — YANKAUER,BULB TIP,W/O VENT,RIGID,STERILE: Brand: MEDLINE

## (undated) DEVICE — GLOVE ORANGE PI 7   MSG9070

## (undated) DEVICE — SUTURE VCRL 0 L18IN ABSRB VLT POLYGLACTIN 910 BRAID COAT J906G

## (undated) DEVICE — Z DISCONTINUED USE 2273271 SOLUTION PREP 4OZ 10% POVIDONE IOD BTL FLIP TOP CAP

## (undated) DEVICE — TROCAR: Brand: KII® SLEEVE

## (undated) DEVICE — GARMENT,MEDLINE,DVT,INT,CALF,MED, GEN2: Brand: MEDLINE

## (undated) DEVICE — SUTURE VCRL SZ 0 L27IN ABSRB VLT L36MM CT-1 1/2 CIR J340H

## (undated) DEVICE — Z DISCONTINUED BY MEDLINE USE 2711682 TRAY SKIN PREP DRY W/ PREM GLV

## (undated) DEVICE — TRAY URIN CATH 16FR DRNGE BG STATLOK STBL DEV F SURSTP

## (undated) DEVICE — POUCH INSTR W40XL26IN BUTT FLD COLL FLTR DRNGE PRT

## (undated) DEVICE — MASTISOL ADHESIVE LIQ 2/3ML

## (undated) DEVICE — STRIP,CLOSURE,WOUND,MEDI-STRIP,1/2X4: Brand: MEDLINE

## (undated) DEVICE — Device

## (undated) DEVICE — SUTURE CHROMIC GUT SZ 2-0 L27IN ABSRB BRN L26MM SH 1/2 CIR G123H

## (undated) DEVICE — TOWEL,OR,DSP,ST,BLUE,STD,4/PK,20PK/CS: Brand: MEDLINE

## (undated) DEVICE — 9165 UNIVERSAL PATIENT PLATE: Brand: 3M™

## (undated) DEVICE — SOLUTION SCRB 4OZ 10% POVIDONE IOD ANTIMIC BTL

## (undated) DEVICE — BASIN SET: Brand: MEDLINE INDUSTRIES, INC.

## (undated) DEVICE — GLOVE SURG SZ 6 THK91MIL LTX FREE SYN POLYISOPRENE ANTI

## (undated) DEVICE — JELLY,LUBE,STERILE,FLIP TOP,TUBE,4-OZ: Brand: MEDLINE

## (undated) DEVICE — GLOVE ORANGE PI 8   MSG9080

## (undated) DEVICE — GOWN,AURORA,NON-REINFORCED,2XL: Brand: MEDLINE

## (undated) DEVICE — Z INACTIVE USE 2660664 SOLUTION IRRIG 3000ML 0.9% SOD CHL USP UROMATIC PLAS CONT

## (undated) DEVICE — SOLUTION ANTIFOG VIS SYS CLEARIFY LAPSCP

## (undated) DEVICE — BLANKET WRM W29.9XL79.1IN UP BODY FORC AIR MISTRAL-AIR

## (undated) DEVICE — SUTURE MCRYL SZ 2-0 L27IN ABSRB UD SH L26MM TAPERPOINT NDL Y417H

## (undated) DEVICE — 3000CC GUARDIAN II: Brand: GUARDIAN

## (undated) DEVICE — GOWN,SIRUS,NONRNF,SETINSLV,XL,20/CS: Brand: MEDLINE

## (undated) DEVICE — SUTURE VCRL SZ 4-0 L18IN ABSRB UD L19MM PS-2 3/8 CIR PRIM J496H

## (undated) DEVICE — GLOVE SURG SZ 65 THK91MIL LTX FREE SYN POLYISOPRENE

## (undated) DEVICE — PAD,SANITARY,11 IN,MAXI,W/WINGS,N-STRL: Brand: MEDLINE

## (undated) DEVICE — SUTURE PLN GUT SZ 2-0 L27IN ABSRB YELLOWISH TAN L36MM CT-1 843H

## (undated) DEVICE — SKIN AFFIX SURG ADHESIVE 72/CS 0.55ML: Brand: MEDLINE

## (undated) DEVICE — ADHESIVE LIQ MED 2/3 CC VI DEV REL INJ LO RISK MASTISOL

## (undated) DEVICE — 3M™ WARMING BLANKET, UPPER BODY, 10 PER CASE, 42268: Brand: BAIR HUGGER™

## (undated) DEVICE — INSUFFLATION NEEDLE TO ESTABLISH PNEUMOPERITONEUM.: Brand: INSUFFLATION NEEDLE

## (undated) DEVICE — SOLUTION SCRB 4OZ 7.5% POVIDONE IOD FLIP TOP CAP

## (undated) DEVICE — SOLUTION IRRIG 1000ML STRL H2O USP PLAS POUR BTL

## (undated) DEVICE — SOLUTION PREP POVIDONE IOD FOR SKIN MUCOUS MEM PRIOR TO

## (undated) DEVICE — 4-PORT MANIFOLD: Brand: NEPTUNE 2

## (undated) DEVICE — AGENT HEMSTAT 3GM OXIDIZED REGENERATED CELOS ABSRB FOR CONT (ORDER MULTIPLES OF 5EA)

## (undated) DEVICE — CHLORAPREP 26ML ORANGE